# Patient Record
Sex: MALE | Race: WHITE | NOT HISPANIC OR LATINO | Employment: FULL TIME | URBAN - METROPOLITAN AREA
[De-identification: names, ages, dates, MRNs, and addresses within clinical notes are randomized per-mention and may not be internally consistent; named-entity substitution may affect disease eponyms.]

---

## 2017-01-01 ENCOUNTER — LAB VISIT (OUTPATIENT)
Dept: LAB | Facility: HOSPITAL | Age: 57
End: 2017-01-01
Attending: ALLERGY & IMMUNOLOGY
Payer: COMMERCIAL

## 2017-01-01 ENCOUNTER — OFFICE VISIT (OUTPATIENT)
Dept: ALLERGY | Facility: CLINIC | Age: 57
End: 2017-01-01
Payer: COMMERCIAL

## 2017-01-01 ENCOUNTER — OFFICE VISIT (OUTPATIENT)
Dept: DERMATOLOGY | Facility: CLINIC | Age: 57
End: 2017-01-01
Payer: COMMERCIAL

## 2017-01-01 ENCOUNTER — TELEPHONE (OUTPATIENT)
Dept: PULMONOLOGY | Facility: CLINIC | Age: 57
End: 2017-01-01

## 2017-01-01 ENCOUNTER — TELEPHONE (OUTPATIENT)
Dept: ALLERGY | Facility: CLINIC | Age: 57
End: 2017-01-01

## 2017-01-01 VITALS
HEART RATE: 99 BPM | WEIGHT: 311 LBS | BODY MASS INDEX: 44.62 KG/M2 | HEIGHT: 70 IN | OXYGEN SATURATION: 94 % | BODY MASS INDEX: 43.33 KG/M2 | DIASTOLIC BLOOD PRESSURE: 84 MMHG | SYSTOLIC BLOOD PRESSURE: 136 MMHG | WEIGHT: 302.69 LBS

## 2017-01-01 DIAGNOSIS — R06.09 EXERTIONAL DYSPNEA: ICD-10-CM

## 2017-01-01 DIAGNOSIS — R76.0 ABNORMAL ANTIBODY TITER: Primary | ICD-10-CM

## 2017-01-01 DIAGNOSIS — R76.0 ABNORMAL ANTIBODY TITER: ICD-10-CM

## 2017-01-01 DIAGNOSIS — J31.0 CHRONIC RHINITIS, UNSPECIFIED TYPE: ICD-10-CM

## 2017-01-01 DIAGNOSIS — J84.9 ILD (INTERSTITIAL LUNG DISEASE): ICD-10-CM

## 2017-01-01 DIAGNOSIS — L71.9 ROSACEA: Primary | ICD-10-CM

## 2017-01-01 LAB
DEPRECATED S PNEUM 1 IGG SER-MCNC: 1.1 MCG/ML
DEPRECATED S PNEUM12 IGG SER-MCNC: <0.3 MCG/ML
DEPRECATED S PNEUM14 IGG SER-MCNC: 64.8 MCG/ML
DEPRECATED S PNEUM19 IGG SER-MCNC: 2.7 MCG/ML
DEPRECATED S PNEUM23 IGG SER-MCNC: 0.3 MCG/ML
DEPRECATED S PNEUM3 IGG SER-MCNC: 1.2 MCG/ML
DEPRECATED S PNEUM4 IGG SER-MCNC: <0.3 MCG/ML
DEPRECATED S PNEUM5 IGG SER-MCNC: 0.7 MCG/ML
DEPRECATED S PNEUM8 IGG SER-MCNC: 2.3 MCG/ML
DEPRECATED S PNEUM9 IGG SER-MCNC: 0.6 MCG/ML
S PNEUM DA 18C IGG SER-MCNC: 38.5 MCG/ML
S PNEUM DA 6B IGG SER-MCNC: 0.6 MCG/ML
S PNEUM DA 7F IGG SER-MCNC: 0.9 MCG/ML
S PNEUM DA 9V IGG SER-MCNC: 5.5 MCG/ML

## 2017-01-01 PROCEDURE — 36415 COLL VENOUS BLD VENIPUNCTURE: CPT

## 2017-01-01 PROCEDURE — 90471 IMMUNIZATION ADMIN: CPT | Mod: S$GLB,,, | Performed by: ALLERGY & IMMUNOLOGY

## 2017-01-01 PROCEDURE — 90670 PCV13 VACCINE IM: CPT | Mod: S$GLB,,, | Performed by: ALLERGY & IMMUNOLOGY

## 2017-01-01 PROCEDURE — 86317 IMMUNOASSAY INFECTIOUS AGENT: CPT | Mod: 59

## 2017-01-01 PROCEDURE — 99212 OFFICE O/P EST SF 10 MIN: CPT | Mod: S$GLB,,, | Performed by: DERMATOLOGY

## 2017-01-01 PROCEDURE — 99999 PR PBB SHADOW E&M-EST. PATIENT-LVL III: CPT | Mod: PBBFAC,,, | Performed by: ALLERGY & IMMUNOLOGY

## 2017-01-01 PROCEDURE — 99214 OFFICE O/P EST MOD 30 MIN: CPT | Mod: 25,S$GLB,, | Performed by: ALLERGY & IMMUNOLOGY

## 2017-01-01 PROCEDURE — 99999 PR PBB SHADOW E&M-EST. PATIENT-LVL III: CPT | Mod: PBBFAC,,, | Performed by: DERMATOLOGY

## 2017-07-10 ENCOUNTER — HOSPITAL ENCOUNTER (OUTPATIENT)
Dept: RADIOLOGY | Facility: HOSPITAL | Age: 57
Discharge: HOME | End: 2017-07-10
Attending: INTERNAL MEDICINE
Payer: COMMERCIAL

## 2017-07-10 ENCOUNTER — HOSPITAL ENCOUNTER (OUTPATIENT)
Dept: PULMONOLOGY | Facility: CLINIC | Age: 57
Discharge: HOME | End: 2017-07-10
Payer: COMMERCIAL

## 2017-07-10 ENCOUNTER — OFFICE VISIT (OUTPATIENT)
Dept: PULMONOLOGY | Facility: CLINIC | Age: 57
End: 2017-07-10
Payer: COMMERCIAL

## 2017-07-10 VITALS
SYSTOLIC BLOOD PRESSURE: 138 MMHG | HEIGHT: 70 IN | WEIGHT: 315 LBS | RESPIRATION RATE: 16 BRPM | HEART RATE: 103 BPM | DIASTOLIC BLOOD PRESSURE: 84 MMHG | OXYGEN SATURATION: 93 % | BODY MASS INDEX: 45.1 KG/M2

## 2017-07-10 DIAGNOSIS — J30.0 CHRONIC VASOMOTOR RHINITIS: ICD-10-CM

## 2017-07-10 DIAGNOSIS — J84.9 ILD (INTERSTITIAL LUNG DISEASE): ICD-10-CM

## 2017-07-10 DIAGNOSIS — E66.01 MORBID OBESITY DUE TO EXCESS CALORIES: ICD-10-CM

## 2017-07-10 PROBLEM — J31.0 CHRONIC RHINITIS: Status: ACTIVE | Noted: 2017-07-10

## 2017-07-10 LAB
POST FEV1 FVC: 0.87
POST FEV1: 1.4
POST FVC: 1.61
PRE FEV1 FVC: 87
PRE FEV1: 1.35
PRE FVC: 1.56
PREDICTED FEV1 FVC: 81
PREDICTED FEV1: 3.52
PREDICTED FVC: 4.33

## 2017-07-10 PROCEDURE — 99205 OFFICE O/P NEW HI 60 MIN: CPT | Mod: 25,S$GLB,, | Performed by: INTERNAL MEDICINE

## 2017-07-10 PROCEDURE — 94729 DIFFUSING CAPACITY: CPT | Mod: S$GLB,,, | Performed by: INTERNAL MEDICINE

## 2017-07-10 PROCEDURE — 94060 EVALUATION OF WHEEZING: CPT | Mod: S$GLB,,, | Performed by: INTERNAL MEDICINE

## 2017-07-10 PROCEDURE — 71250 CT THORAX DX C-: CPT | Mod: 26,,, | Performed by: RADIOLOGY

## 2017-07-10 PROCEDURE — 99999 PR PBB SHADOW E&M-NEW PATIENT-LVL IV: CPT | Mod: PBBFAC,,, | Performed by: INTERNAL MEDICINE

## 2017-07-10 PROCEDURE — 71250 CT THORAX DX C-: CPT | Mod: TC

## 2017-07-10 RX ORDER — PREDNISONE 5 MG/1
TABLET ORAL
Qty: 70 TABLET | Refills: 2 | Status: SHIPPED | OUTPATIENT
Start: 2017-07-10 | End: 2017-07-28

## 2017-07-10 RX ORDER — PREDNISONE 20 MG/1
20 TABLET ORAL DAILY
COMMUNITY
Start: 2017-06-26 | End: 2017-07-10

## 2017-07-10 NOTE — PROGRESS NOTES
"Subjective:       Patient ID: Patrick Dennis is a 57 y.o. male.    Chief Complaint: Interstitial Lung Disease    HPI HISTORY OF PRESENT ILLNESS:  Mr. Dennis is a 57-year-old nonsmoker who comes   for assessment of interstitial lung disease.  He explains that he developed the   symptom of cough near the end of last year.  He saw his physician at home and   was treated initially for "bronchitis."  His workup at that time eventually   included a CT scan, which revealed evidence for interstitial lung abnormalities.    Around the same time, he also began having exertional dyspnea.  He continues   to have a cough each morning and some associated sinus drainage.    Laboratory studies done during his workup at home revealed positive markers for   antibodies for hypersensitivity pneumonitis.  At the time of that finding, he   was begun on treatment with prednisone.  He has apparently been on daily   prednisone for the past several months, although he is not sure of the exact   date when he began this therapy.  He does state that his cough seems to be some   improved.  Unfortunately, his weight has increased to approximately 40 pounds   and he has developed significant facial swelling.    Mr. Dennis does not know of any proven past lung diseases.  He has never been   diagnosed with significant cardiac disease.  He does not have any significant   arthritis.    Mr. Dennis is a nonsmoker.  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    He has an exposure to sawdust and other dust at least intermittently at his   job.  He believes his family history is negative for lung diseases.    DATA:  I have reviewed images from the CT scans of the chest, which were done   between November 2016 and June of this year.  These outside studies show that   the cardiac silhouette is not enlarged.  There are no definite pleural   abnormalities.  There are scattered reticular abnormalities, mostly in a   subpleural distribution, " present within all lung zones.  There also are   scattered areas of ground-glass opacity.  Overall, these CT scans do not show   any marked interval changes during the interval encompassed by these studies.    Finalizing this dictation was delayed by the recent disruption in transcription services.    For expediency, it is being signed without review.      CB/HN  dd: 07/17/2017 09:49:19 (CDT)  td: 07/17/2017 23:17:04 (CDT)  Doc ID   #[id number]  Job ID #[id number]    CC:       Review of Systems   Constitutional: Negative for fever and fatigue.   HENT: Positive for postnasal drip and congestion. Negative for sinus pressure and voice change.    Respiratory: Positive for cough and dyspnea on extertion. Negative for sputum production, shortness of breath and wheezing.    Cardiovascular: Negative for chest pain and leg swelling.   Genitourinary: Negative for difficulty urinating.   Musculoskeletal: Negative for arthralgias and back pain.   Skin: Negative for rash.   Gastrointestinal: Negative for abdominal pain and acid reflux.   Neurological: Negative for dizziness and weakness.   Hematological: Negative for adenopathy.       Objective:      Physical Exam   Constitutional: He is oriented to person, place, and time. He appears well-developed and well-nourished. He is obese.   HENT:   Head: Normocephalic.   Mouth/Throat: Oropharynx is clear and moist. No oropharyngeal exudate.   Neck: Normal range of motion. Neck supple. No JVD present. No tracheal deviation present. No thyromegaly present.   Cardiovascular: Normal rate, regular rhythm and normal heart sounds.    No murmur heard.  Pulmonary/Chest: Symmetric chest wall expansion. No stridor. He has decreased breath sounds. He has no wheezes. He has no rhonchi. He has no rales. He exhibits no tenderness.   Abdominal: Soft.   Musculoskeletal: He exhibits edema (mild LE edema).   Lymphadenopathy:     He has no cervical adenopathy.   Neurological: He is alert and oriented to person,  place, and time.   Skin: Skin is warm and dry. No rash noted. No erythema. Nails show no clubbing.   Psychiatric: He has a normal mood and affect.   Vitals reviewed.    Personal Diagnostic Review    No flowsheet data found.      Assessment:       1. ILD (interstitial lung disease)    2. Morbid obesity due to excess calories    3. Chronic vasomotor rhinitis        Outpatient Encounter Prescriptions as of 7/10/2017   Medication Sig Dispense Refill    predniSONE (DELTASONE) 5 MG tablet Take 3 tabs each AM x 1 week, then 2 tabs each AM afterward.  Take with food. 70 tablet 2     No facility-administered encounter medications on file as of 7/10/2017.      Orders Placed This Encounter   Procedures    CT Chest Without Contrast     Standing Status:   Future     Number of Occurrences:   1     Standing Expiration Date:   7/10/2018    CBC auto differential     Standing Status:   Future     Number of Occurrences:   1     Standing Expiration Date:   7/10/2018    Comprehensive metabolic panel     Standing Status:   Future     Number of Occurrences:   1     Standing Expiration Date:   7/10/2018    IgE     Standing Status:   Future     Number of Occurrences:   1     Standing Expiration Date:   7/10/2018    Fungal Precipitins (Hypersensitivity PneumonitisI)     Standing Status:   Future     Number of Occurrences:   1     Standing Expiration Date:   7/10/2018    C-reactive protein     Standing Status:   Future     Number of Occurrences:   1     Standing Expiration Date:   7/10/2018    Sedimentation rate, manual     Standing Status:   Future     Number of Occurrences:   1     Standing Expiration Date:   7/10/2018    Spirometry with/without bronchodilator     Standing Status:   Future     Number of Occurrences:   1     Standing Expiration Date:   7/10/2018    DLCO-Carbon Monoxide Diffusing Capacity     Standing Status:   Future     Number of Occurrences:   1     Standing Expiration Date:   7/10/2018     Plan:     CBC, CMP,  ESR, CRP, and HP panel.  Pre/Post Spirometry and DLCO.  CT Chest with HR images to compare with outside studies.  Decrease Prednisone dose 15 mg QAM x 1 week, then 10 mg QAM afterward.  Phone review.    NOTE:  72 min visit with >50% time reviewing outside records and images;   and counseling regarding management of ILD.

## 2017-07-10 NOTE — LETTER
July 10, 2017      Mayra Burgos MD  [address] [place] [address]           [facility] - Pulmonary Services  [facility]  [place] [address]  Phone: [phone]          Patient: Patrick Dennis   MR Number: [id number]   YOB: 1960   Date of Visit: 7/10/2017       Dear Dr. Mayra Burgos:    Thank you for referring Patrick Dennis to me for evaluation. Attached you will find relevant portions of my assessment and plan of care.    If you have questions, please do not hesitate to call me. I look forward to following Patrick Dennis along with you.    Sincerely,    BAILEY Solorio MD    Enclosure  CC:  No Recipients    If you would like to receive this communication electronically, please contact [email or web address] or [phone] to request more information on [employer] Link access.    For providers and/or their staff who would like to refer a patient to [facility], please contact us through our one-stop-shop provider referral line, [facility], at [phone].    If you feel you have received this communication in error or would no longer like to receive these types of communications, please e-mail [email or web address]

## 2017-07-11 NOTE — PATIENT INSTRUCTIONS
CBC, CMP, ESR, CRP, and HP panel.  Pre/Post Spirometry and DLCO.  CT Chest with HR images to compare with outside studies.  Decrease Prednisone dose 15 mg QAM x 1 week, then 10 mg QAM afterward.  Phone review.

## 2017-07-25 ENCOUNTER — TELEPHONE (OUTPATIENT)
Dept: PULMONOLOGY | Facility: CLINIC | Age: 57
End: 2017-07-25

## 2017-07-27 DIAGNOSIS — R06.09 EXERTIONAL DYSPNEA: ICD-10-CM

## 2017-07-28 ENCOUNTER — HOSPITAL ENCOUNTER (OUTPATIENT)
Dept: CARDIOLOGY | Facility: CLINIC | Age: 57
Discharge: HOME | End: 2017-07-28
Payer: COMMERCIAL

## 2017-07-28 ENCOUNTER — OFFICE VISIT (OUTPATIENT)
Dept: PULMONOLOGY | Facility: CLINIC | Age: 57
End: 2017-07-28
Payer: COMMERCIAL

## 2017-07-28 ENCOUNTER — HOSPITAL ENCOUNTER (OUTPATIENT)
Dept: PULMONOLOGY | Facility: CLINIC | Age: 57
Discharge: HOME | End: 2017-07-28
Payer: COMMERCIAL

## 2017-07-28 VITALS
HEIGHT: 70 IN | WEIGHT: 315 LBS | OXYGEN SATURATION: 88 % | BODY MASS INDEX: 45.1 KG/M2 | SYSTOLIC BLOOD PRESSURE: 124 MMHG | RESPIRATION RATE: 16 BRPM | HEART RATE: 109 BPM | DIASTOLIC BLOOD PRESSURE: 80 MMHG

## 2017-07-28 VITALS — HEIGHT: 68 IN | WEIGHT: 315 LBS | BODY MASS INDEX: 47.74 KG/M2

## 2017-07-28 DIAGNOSIS — J20.9 ACUTE BRONCHITIS, UNSPECIFIED ORGANISM: ICD-10-CM

## 2017-07-28 DIAGNOSIS — E66.01 MORBID OBESITY DUE TO EXCESS CALORIES: ICD-10-CM

## 2017-07-28 DIAGNOSIS — R06.09 EXERTIONAL DYSPNEA: ICD-10-CM

## 2017-07-28 DIAGNOSIS — J84.9 ILD (INTERSTITIAL LUNG DISEASE): ICD-10-CM

## 2017-07-28 DIAGNOSIS — J84.9 ILD (INTERSTITIAL LUNG DISEASE): Primary | ICD-10-CM

## 2017-07-28 LAB
DIASTOLIC DYSFUNCTION: YES
ESTIMATED PA SYSTOLIC PRESSURE: 36
RETIRED EF AND QEF - SEE NOTES: 45 (ref 55–65)
TRICUSPID VALVE REGURGITATION: ABNORMAL

## 2017-07-28 PROCEDURE — 96374 THER/PROPH/DIAG INJ IV PUSH: CPT | Mod: 59,S$GLB,, | Performed by: INTERNAL MEDICINE

## 2017-07-28 PROCEDURE — 99214 OFFICE O/P EST MOD 30 MIN: CPT | Mod: 25,S$GLB,, | Performed by: INTERNAL MEDICINE

## 2017-07-28 PROCEDURE — 94620 PR PULMONARY STRESS TESTING,SIMPLE: CPT | Mod: S$GLB,,, | Performed by: INTERNAL MEDICINE

## 2017-07-28 PROCEDURE — 99999 PR PBB SHADOW E&M-EST. PATIENT-LVL IV: CPT | Mod: PBBFAC,,, | Performed by: INTERNAL MEDICINE

## 2017-07-28 PROCEDURE — 93306 TTE W/DOPPLER COMPLETE: CPT | Mod: S$GLB,,, | Performed by: INTERNAL MEDICINE

## 2017-07-28 RX ORDER — PREDNISONE 2.5 MG/1
7.5 TABLET ORAL DAILY
Qty: 90 TABLET | Refills: 2 | Status: SHIPPED | OUTPATIENT
Start: 2017-07-28 | End: 2017-07-28 | Stop reason: SDUPTHER

## 2017-07-28 RX ORDER — PREDNISONE 2.5 MG/1
7.5 TABLET ORAL DAILY
Qty: 90 TABLET | Refills: 2 | Status: SHIPPED | OUTPATIENT
Start: 2017-07-28 | End: 2017-08-07

## 2017-07-28 RX ORDER — DOXYCYCLINE HYCLATE 100 MG
100 TABLET ORAL EVERY 12 HOURS
Qty: 20 TABLET | Refills: 1 | Status: SHIPPED | OUTPATIENT
Start: 2017-07-28 | End: 2017-10-02

## 2017-07-28 NOTE — PROGRESS NOTES
Patient identified via spelling of name and date of birth. Patient denies blood transfusion reaction. Consent obtained for use of contrast. Definity 1.5ml IVP vorb Dr. Roe. 22 gauge saline lock started in right forearm under aseptic technique. Definity 1.5ml IVP used as contrast for 2 d imaging. Saline lock d/reymundo and pressure dressing applied. Tolerated procedure well.

## 2017-07-29 NOTE — PATIENT INSTRUCTIONS
ACE level, 2 D Echo with color, 6 MWT (O2 Qual protocol).  Begin Doxycycline 100 mg twice daily x 10 days.  Decrease Prednisone dose to 7.5 mg daily.  Begin O2 therapy with activities and during sleep if above study qualifies him.  Phone review of above, and consider CTS consult to consider   for VATS/lung biopsy vs empiric trial with Cellcept or azathioprine.

## 2017-07-29 NOTE — PROGRESS NOTES
Subjective:       Patient ID: Patrick Dennis is a 57 y.o. male.    Chief Complaint: Results    HPI REVIEW VISIT    Mr. Dennis returns to follow up recent studies done to investigate chronic   interstitial lung disease.  Today, he reports continued shortness of breath with   modest daily activities.  He also is having an ongoing cough with sputum which   has become discolored.  He is not having any fever or hemoptysis.  Following    the visit here earlier this month, he reduced his dose of prednisone to 10 mg   daily.    Laboratory studies done at the time of his previous visit show normal (or no   significant abnormalities) values for the CBC, sedimentation rate, C-reactive protein,   IgE, and the hypersensitivity pneumonitis panel.  His comprehensive metabolic   profile does show an elevation in the bicarbonate at 32, and the glucose was 159   in this nonfasting sample.    I have reviewed the images from the CT scan of the chest done earlier this month,   and compared these with the outside scans which he has provided.  The outside   scans were obtained between November of last year and June of this year.  All   the scans show prominent central pulmonary vasculature without any acute   cardiovascular abnormalities.  There are bilateral reticular and ground-glass   abnormalities present throughout essentially all lung zones in a somewhat   uneven distribution.  There is associated bronchiectasis.  There do not appear   to be any areas of honeycomb abnormality.  Overall, radiographic findings are   similar throughout the period of time encompassed by these studies.    Pulmonary function studies done at the time of his previous visit show the   forced vital capacity is 1.56 L, which is 36% of predicted.  The FEV1 is 1.35 L,   or 38% of predicted.  The ratio of these values is 87%.  The diffusion capacity   is 15.8, which is 58% of predicted.  Following an aerosol bronchodilator   administration, there is no  significant change in the spirometry parameters.    These pulmonary function study results are consistent with a moderate to severe   restrictive ventilatory impairment.  There is no evidence of obstruction or a   bronchodilator response.      CB/HN  dd: 07/28/2017 21:41:39 (CDT)  td: 07/29/2017 00:21:14 (CDT)  Doc ID   #[id number]  Job ID #[id number]    CC:       Review of Systems    Objective:      Physical Exam  Personal Diagnostic Review    Pulmonary Function Tests 7/28/2017   Ordering Provider MD Osmin.   Performing nurse/tech/RT MIRIAN Rivera   Diagnosis Qualify for Oxygen   Height 68   Weight 5192.27   BMI (Calculated) 49.4   Patient Race    6MWT Status completed without stopping   Patient Reported Dyspnea   Was O2 used? No   6MW Distance walked (feet) 1400   Distance walked (meters) 426.72   Did patient stop? No   Type of assistive device(s) used? no assistive devices   Oxygen Saturation 92   Supplemental Oxygen Room Air   Heart Rate 98   Blood Pressure 144/87   Adina Dyspnea Rating  very, very light (just noticeable)   Oxygen Saturation 68   Supplemental Oxygen Room Air   Heart Rate 142   Blood Pressure 222/110   Adina Dyspnea Rating  very heavy   Recovery Time (seconds) 228   Oxygen Saturation 94   Supplemental Oxygen Room Air   Heart Rate 110   Blood Pressure 175/89   Is procedure ready for interpretation? Yes   Oxygen Qualification? Yes   Oxygen Saturation 98   Supplemental Oxygen 2 L/M   Heart Rate 106   Blood Pressure 160/96   Adina Dyspnea Rating  very, very light (just noticeable)   Oxygen Saturation 95   Supplemental Oxygen 2 L/M   Heart Rate 119   Blood Pressure 168/101   Adina Dyspnea Rating  very, very light (just noticeable)   Recovery Time (seconds) 107   Oxygen Saturation 98   Supplemental Oxygen 2 L/M   Heart Rate 107   Blood Pressure 163/97   Some recent data might be hidden         Assessment:       1. ILD (interstitial lung disease)    2. Acute bronchitis, unspecified organism    3. Morbid  obesity due to excess calories    4. Exertional dyspnea        Outpatient Encounter Prescriptions as of 7/28/2017   Medication Sig Dispense Refill    CHERATUSSIN AC  mg/5 mL syrup       doxycycline (VIBRA-TABS) 100 MG tablet Take 1 tablet (100 mg total) by mouth every 12 (twelve) hours. 20 tablet 1    predniSONE (DELTASONE) 2.5 MG tablet Take 3 tablets (7.5 mg total) by mouth once daily. 90 tablet 2    [DISCONTINUED] predniSONE (DELTASONE) 2.5 MG tablet Take 3 tablets (7.5 mg total) by mouth once daily. 90 tablet 2    [DISCONTINUED] predniSONE (DELTASONE) 5 MG tablet Take 3 tabs each AM x 1 week, then 2 tabs each AM afterward.  Take with food. 70 tablet 2     No facility-administered encounter medications on file as of 7/28/2017.      Orders Placed This Encounter   Procedures    Angiotensin converting enzyme     Standing Status:   Future     Number of Occurrences:   1     Standing Expiration Date:   7/28/2018    Six Minute Walk Test to qualify for Home Oxygen     Standing Status:   Future     Number of Occurrences:   1     Standing Expiration Date:   7/29/2018     Plan:     ACE level, 2 D Echo with color, 6 MWT (O2 Qual protocol).  Begin Doxycycline 100 mg twice daily x 10 days.  Decrease Prednisone dose to 7.5 mg daily.  Begin O2 therapy with activities and during sleep if above study qualifies him.  Phone review of above, and consider CTS consult to consider   for VATS/lung biopsy vs empiric trial with Cellcept or azathioprine.      NOTE:  36 min visit with all time counseling patient and family regarding management of ILD

## 2017-07-29 NOTE — PROCEDURES
Patrick Dennis is a 57 y.o.  male patient, who presents for a 6 minute walk test ordered by Parag Solorio MD.  The diagnosis is Qualify for Oxygen; Interstitial Lung Disease.  The patient's BMI is 49.4 kg/m2. Predicted distance (lower limit of normal) is 321.23 meters.    Test Results:    The test was completed without stopping.  The total time walked was 360 seconds.  During walking, the patient reported:  Dyspnea. The patient used supplemental oxygen during repeat testing.     07/28/2017---------Distance: 426.72 meters (1400 feet)     O2 Sat % Supplemental Oxygen Heart Rate Blood Pressure Adina Scale   Pre-exercise  (Resting) 92 % Room Air 98 bpm 144/87 mmHg 0.5   During Exercise 68 % Room Air 142 bpm 222/100 mmHg 7-8   Post-exercise   94 % Room Air  110 bpm 175/89 mmHg       Recovery Time: 228 seconds    Oxygen Qualification:     O2 Sat % Supplemental Oxygen Heart Rate Blood Pressure Adina Scale   Pre-exercise  (Resting) 98 % 2 L/M  106 bpm  160/96 mmHg  0.5    During Exercise 95 %  2 L/M  119 bpm  168/101 mmHg  0.5    Post-exercise   98 %  2 L/M  107 bpm  163/97 mmHg         Performing nurse/tech: MIRIAN Rivera    PREVIOUS STUDY:   The patient has not had a previous study.      CLINICAL INTERPRETATION:  Six minute walk distance is 426.72 meters (1400 feet) with very heavy dyspnea.  During exercise, there was significant desaturation while breathing room air.  Both blood pressure and heart rate increased significantly with walking.  This may represent a hypertensive and a tachycardic response to exercise.  The patient did not report non-pulmonary symptoms during exercise.  The patient may benefit from using supplemental oxygen during exertion.  No previous study performed.  Based upon age and body mass index, exercise capacity is normal.   Oxygen saturation did improve while breathing supplemental oxygen.

## 2017-08-02 ENCOUNTER — TELEPHONE (OUTPATIENT)
Dept: PULMONOLOGY | Facility: CLINIC | Age: 57
End: 2017-08-02

## 2017-08-02 DIAGNOSIS — R06.09 EXERTIONAL DYSPNEA: ICD-10-CM

## 2017-08-02 DIAGNOSIS — J84.9 ILD (INTERSTITIAL LUNG DISEASE): Primary | ICD-10-CM

## 2017-08-02 RX ORDER — MYCOPHENOLATE MOFETIL 500 MG/1
TABLET ORAL
Qty: 120 TABLET | Refills: 6 | Status: SHIPPED | OUTPATIENT
Start: 2017-08-02 | End: 2018-01-01 | Stop reason: SDUPTHER

## 2017-08-02 NOTE — TELEPHONE ENCOUNTER
----- Message from Alie Ford sent at 8/2/2017 12:36 PM CDT -----  Contact: pt [phone]  Pt requests the nurse to call him regarding his results. Pt can be reached at [phone].

## 2017-08-02 NOTE — TELEPHONE ENCOUNTER
Pt informed that recent tests show marked decline in oximetry during activities despite adequate baseline value.  Echo shows evidence for mild systolic and diastolic LV dysfunction, but PA pressure not significantly elevated.  ACE level nl.    We discussed again the option of surgery consult to consider for lung biopsy.  He is reluctant to pursue this; and I believe his weight would make this a more difficult undertaking.  As an alternative, he will begin trial with Cellcept.  Arrange f/u here in 6 weeks with check of CBC and repeat PFTs.

## 2017-09-18 ENCOUNTER — OFFICE VISIT (OUTPATIENT)
Dept: ALLERGY | Facility: CLINIC | Age: 57
End: 2017-09-18
Payer: COMMERCIAL

## 2017-09-18 VITALS
WEIGHT: 315 LBS | BODY MASS INDEX: 45.1 KG/M2 | HEART RATE: 84 BPM | HEIGHT: 70 IN | SYSTOLIC BLOOD PRESSURE: 128 MMHG | OXYGEN SATURATION: 95 % | DIASTOLIC BLOOD PRESSURE: 74 MMHG

## 2017-09-18 DIAGNOSIS — L30.9 DERMATITIS DUE TO UNKNOWN CAUSE: ICD-10-CM

## 2017-09-18 DIAGNOSIS — J84.9 INTERSTITIAL LUNG DISEASE: ICD-10-CM

## 2017-09-18 DIAGNOSIS — R05.9 COUGH: Primary | ICD-10-CM

## 2017-09-18 DIAGNOSIS — J47.9 BRONCHIECTASIS WITHOUT COMPLICATION: ICD-10-CM

## 2017-09-18 DIAGNOSIS — J31.0 CHRONIC RHINITIS, UNSPECIFIED TYPE: ICD-10-CM

## 2017-09-18 PROCEDURE — 99204 OFFICE O/P NEW MOD 45 MIN: CPT | Mod: S$GLB,,, | Performed by: ALLERGY & IMMUNOLOGY

## 2017-09-18 PROCEDURE — 99999 PR PBB SHADOW E&M-EST. PATIENT-LVL III: CPT | Mod: PBBFAC,,, | Performed by: ALLERGY & IMMUNOLOGY

## 2017-09-18 PROCEDURE — 3008F BODY MASS INDEX DOCD: CPT | Mod: S$GLB,,, | Performed by: ALLERGY & IMMUNOLOGY

## 2017-09-18 NOTE — PROGRESS NOTES
Subjective:       Patient ID: Patrick Dennis is a 57 y.o. male.    Chief Complaint:  Shortness of Breath and Sinus Problem (runny nose, cough, sneezing)  and chronic, recurrent facial rash    HPI    Pt presents w mother.   Has hx interstitial lung disease, followed by pulmonary, with negative fungal precipitins panel and IgE < 35 IU/ml.  Chest CTs have shown bilateral reticular ground-glass opacities and associated bronchiectasis.    Frequent exacerbations of cough, and shortness of breath have been frequent since about Nov 2016. Has been on abx for suspected lower resp infections about 4 times over last year. Prior to the last year, denies freq abx or freq cough or shortness of breath.    Does report occ sneezing, watery eyes worse in spring. Denies nasal congestion. Has had inreased pnd, throat clearing over last year  Denies overt GERD sx's.    He is also concerned about a chronic facial rash that he had been dealing with since his teenage years. It seems to be aggravated by every facial product and/or soap that he has tried, including Vanicream. Even when he doesn't use soap/cleaning products on his face though, there is some degree of redness, irritation.  He doesn't appreciate any other aggravating factors. Hasn't had prev dermatology eval.          Family History   Problem Relation Age of Onset    No Known Problems Mother     No Known Problems Father     Asthma Neg Hx     Emphysema Neg Hx          Environmental History: Pets in the home: dogs (2).  [place]: tile or linoleum floors and [id number]-de-[id number] carpeting  Tobacco Smoke in Home: no   Non-smoker    History reviewed. No pertinent past medical history.    Family History   Problem Relation Age of Onset    No Known Problems Mother     No Known Problems Father     Asthma Neg Hx     Emphysema Neg Hx    no parental atopy or asthma      Review of Systems   Constitutional: Negative for activity change, fatigue and fever.   HENT: Positive for  postnasal drip and rhinorrhea. Negative for congestion, sinus pressure and sneezing.    Eyes: Negative for discharge, redness and itching.   Respiratory: Positive for cough and shortness of breath. Negative for wheezing.    Cardiovascular: Negative for chest pain.   Gastrointestinal: Negative for constipation, diarrhea, nausea and vomiting.   Genitourinary: Negative for difficulty urinating.   Musculoskeletal: Negative for joint swelling and myalgias.   Skin: Positive for rash (on face).   Neurological: Negative for headaches.   Hematological: Does not bruise/bleed easily.   Psychiatric/Behavioral: Negative for behavioral problems and sleep disturbance.        Objective:    Physical Exam   Constitutional: He is oriented to person, place, and time. He appears well-developed and well-nourished. No distress.   HENT:   Head: Normocephalic and atraumatic.   Right Ear: Tympanic membrane and external ear normal.   Left Ear: Tympanic membrane and external ear normal.   Nose: Nose normal.   Mouth/Throat: Oropharynx is clear and moist. No oropharyngeal exudate.   Eyes: Conjunctivae are normal. Right eye exhibits no discharge. Left eye exhibits no discharge.   Neck: Normal range of motion. Neck supple. No thyromegaly present.   Cardiovascular: Normal rate and regular rhythm.    Pulmonary/Chest: Effort normal and breath sounds normal. No respiratory distress. He has no wheezes.   Abdominal: Soft. Bowel sounds are normal. He exhibits no distension. There is no tenderness.   Musculoskeletal: Normal range of motion. He exhibits no edema.   Lymphadenopathy:     He has no cervical adenopathy.   Neurological: He is alert and oriented to person, place, and time. He exhibits normal muscle tone.   Skin: Skin is warm and dry. He is not diaphoretic. No erythema.   Erythematous cheeks, forehead w few papular lesions   Psychiatric: He has a normal mood and affect. His behavior is normal. Judgment and thought content normal.          Pulmonary notes reviewed      Assessment:       1. Cough    2. Chronic rhinitis, unspecified type    3. Interstitial lung disease    4. Dermatitis due to unknown cause    5. Bronchiectasis without complication         Plan:       Patrick was seen today for shortness of breath and sinus problem.    Diagnoses and all orders for this visit:    Cough  -     Immunoglobulins (IgG, IgA, IgM) Quantitative; Future  -     S.pneumoniae IgG Serotypes; Future  -     Cat epithelium IgE; Future  -     Dog dander IgE; Future  -     D. farinae IgE; Future  -     D. pteronyssinus IgE; Future  -     Aspergillus fumagatus IgE; Future  -     Allergen-Alternaria Alternata; Future  -     Cockroach, American IgE; Future  -     Bahia grass IgE; Future  -     Steven IgE; Future  -     Oak, white IgE; Future  -     Allergen-Cedar; Future  -     Allergen, Pecan [place] IgE; Future  -     Ragweed, short, common IgE; Future  -     Marsh elder, rough IgE; Future  -     Plantain, English IgE; Future    Chronic rhinitis, unspecified type    Interstitial lung disease   Treatment, plan, as per pulmonary    Dermatitis due to unknown cause   Hx and duration less suspicious for allergic contact dermatitis. Dermatology referral

## 2017-09-26 ENCOUNTER — TELEPHONE (OUTPATIENT)
Dept: ALLERGY | Facility: CLINIC | Age: 57
End: 2017-09-26

## 2017-09-26 NOTE — TELEPHONE ENCOUNTER
Relayed results to patient. Patient stated he was busy and will call me back later to schedule a follow up appointment.

## 2017-09-26 NOTE — TELEPHONE ENCOUNTER
----- Message from Waqar Yan MD sent at 9/25/2017  1:15 PM CDT -----  Please call let them know that evaluation of pt's immune system showed that pt may benefit from an extra vaccine, Pneumovax, to decrease frequency of infections, cough. Please schedule follow up appointment to review labs and discuss Pneumovax vaccine. Allergy tests are positive to dust mites

## 2017-10-02 ENCOUNTER — OFFICE VISIT (OUTPATIENT)
Dept: ALLERGY | Facility: CLINIC | Age: 57
End: 2017-10-02
Payer: COMMERCIAL

## 2017-10-02 VITALS
WEIGHT: 311.5 LBS | DIASTOLIC BLOOD PRESSURE: 82 MMHG | HEART RATE: 78 BPM | HEIGHT: 70 IN | OXYGEN SATURATION: 97 % | SYSTOLIC BLOOD PRESSURE: 124 MMHG | BODY MASS INDEX: 44.59 KG/M2

## 2017-10-02 DIAGNOSIS — R76.0 ABNORMAL ANTIBODY TITER: Primary | ICD-10-CM

## 2017-10-02 DIAGNOSIS — J30.89 ALLERGIC RHINITIS DUE TO DUST MITE: ICD-10-CM

## 2017-10-02 DIAGNOSIS — R05.9 COUGH: ICD-10-CM

## 2017-10-02 DIAGNOSIS — J84.9 ILD (INTERSTITIAL LUNG DISEASE): ICD-10-CM

## 2017-10-02 PROCEDURE — 99999 PR PBB SHADOW E&M-EST. PATIENT-LVL III: CPT | Mod: PBBFAC,,, | Performed by: ALLERGY & IMMUNOLOGY

## 2017-10-02 PROCEDURE — 90732 PPSV23 VACC 2 YRS+ SUBQ/IM: CPT | Mod: S$GLB,,, | Performed by: ALLERGY & IMMUNOLOGY

## 2017-10-02 PROCEDURE — 90471 IMMUNIZATION ADMIN: CPT | Mod: S$GLB,,, | Performed by: ALLERGY & IMMUNOLOGY

## 2017-10-02 PROCEDURE — 99214 OFFICE O/P EST MOD 30 MIN: CPT | Mod: S$GLB,,, | Performed by: ALLERGY & IMMUNOLOGY

## 2017-10-02 NOTE — PROGRESS NOTES
Subjective:       Patient ID: Patrick Dennis is a 57 y.o. male.     9/18/17    Chief Complaint:   Fu cough, recurrent bronchitis, ILD    HPI    Pt presents w mother.  Has hx interstitial lung disease, followed by pulmonary, with negative fungal precipitins panel and IgE < 35 IU/ml.  Chest CTs have shown bilateral reticular ground-glass opacities and associated bronchiectasis.  Frequent exacerbations of cough, and shortness of breath have been frequent since about Nov 2016. Has been on abx for suspected lower resp infections about 4 times over last year. Prior to the last year, denies freq abx or freq cough or shortness of breath.  Does report occ sneezing, watery eyes worse in spring. Denies nasal congestion. Has had inreased pnd, throat clearing over last year  Denies overt GERD sx's.    He is also concerned about a chronic facial rash that he had been dealing with since his teenage years. Has upcoming dermatology brandie    Since  has had some URI and AGE sx's over last week. Improving in last 1-2 d  Denies interval abx    Has not started cellcept recommended by pulm yet      Family History   Problem Relation Age of Onset    No Known Problems Mother     No Known Problems Father     Asthma Other     Emphysema Neg Hx          Environmental History: Pets in the home: dogs (2).  Caro: tile or linoleum floors and [id number] carpeting  Tobacco Smoke in Home: no   Non-smoker    Past Medical History:   Diagnosis Date    Cough        Family History   Problem Relation Age of Onset    No Known Problems Mother     No Known Problems Father     Asthma Other     Emphysema Neg Hx    no parental atopy or asthma      Review of Systems   Constitutional: Negative for activity change, fatigue and fever.   HENT: Positive for postnasal drip and rhinorrhea. Negative for congestion, sinus pressure and sneezing.    Eyes: Negative for discharge, redness and itching.   Respiratory: Positive for cough and shortness of  breath. Negative for wheezing.    Cardiovascular: Negative for chest pain.   Gastrointestinal: Negative for constipation, diarrhea, nausea and vomiting.   Genitourinary: Negative for difficulty urinating.   Musculoskeletal: Negative for joint swelling and myalgias.   Skin: Positive for rash (on face).   Neurological: Negative for headaches.   Hematological: Does not bruise/bleed easily.   Psychiatric/Behavioral: Negative for behavioral problems and sleep disturbance.        Objective:    Physical Exam   Constitutional: He is oriented to person, place, and time. He appears well-developed and well-nourished. No distress.   HENT:   Head: Normocephalic and atraumatic.   Right Ear: Tympanic membrane and external ear normal.   Left Ear: Tympanic membrane and external ear normal.   Nose: Nose normal.   Mouth/Throat: Oropharynx is clear and moist. No oropharyngeal exudate.   Eyes: Conjunctivae are normal. Right eye exhibits no discharge. Left eye exhibits no discharge.   Neck: Normal range of motion. Neck supple. No thyromegaly present.   Cardiovascular: Normal rate and regular rhythm.    Pulmonary/Chest: Effort normal and breath sounds normal. No respiratory distress. He has no wheezes.   Abdominal: Soft. Bowel sounds are normal. He exhibits no distension. There is no tenderness.   Musculoskeletal: Normal range of motion. He exhibits no edema.   Lymphadenopathy:     He has no cervical adenopathy.   Neurological: He is alert and oriented to person, place, and time. He exhibits normal muscle tone.   Skin: Skin is warm and dry. He is not diaphoretic. No erythema.   Erythematous cheeks, forehead w few papular lesions   Psychiatric: He has a normal mood and affect. His behavior is normal. Judgment and thought content normal.     Pulmonary notes reviewed    immunoCAPs positive to DM  Nl IgGAM  Low pneumococcal titers      Assessment:       1. Abnormal antibody titer    2. Allergic rhinitis due to dust mite    3. Cough    4. ILD  (interstitial lung disease)         Plan:       1. Challenge with 23-valent pneumococcal polysaccharide vaccine, Pneumovax. Repeat pneumococcal titers in 4-6 weeks. Phone review/pt portal results. Follow up in clinic if poor response. Counseled that if good response to Pneumovax is demonstrated, expect decreased frequency and severity of mucosal infections  2. Ok cellept as per pulm  3. Dust mite avoidance  4. flonase  Keep derm appt

## 2017-10-02 NOTE — LETTER
October 2, 2017      [facility]  [address]  [facility] [address]  Phone: [phone]  Fax: [phone]       Patient: Patrick Dennis   YOB: 1960  Date of Visit: 10/02/2017    To Whom It May Concern:    Gurwinder Dennis  was at [facility] on 10/02/2017.. If you have any questions or concerns, or if I can be of further assistance, please do not hesitate to contact me.    Sincerely,        Elizabeth A Bosworth, LPN

## 2017-10-03 ENCOUNTER — OFFICE VISIT (OUTPATIENT)
Dept: DERMATOLOGY | Facility: CLINIC | Age: 57
End: 2017-10-03
Payer: COMMERCIAL

## 2017-10-03 VITALS — BODY MASS INDEX: 44.62 KG/M2 | WEIGHT: 311 LBS

## 2017-10-03 DIAGNOSIS — L73.8 SEBACEOUS GLAND HYPERPLASIA: ICD-10-CM

## 2017-10-03 DIAGNOSIS — L71.9 ROSACEA: Primary | ICD-10-CM

## 2017-10-03 PROCEDURE — 99202 OFFICE O/P NEW SF 15 MIN: CPT | Mod: S$GLB,,, | Performed by: DERMATOLOGY

## 2017-10-03 PROCEDURE — 99999 PR PBB SHADOW E&M-EST. PATIENT-LVL III: CPT | Mod: PBBFAC,,, | Performed by: DERMATOLOGY

## 2017-10-03 RX ORDER — CLINDAMYCIN PHOSPHATE 10 UG/ML
LOTION TOPICAL
Qty: 60 ML | Refills: 3 | Status: SHIPPED | OUTPATIENT
Start: 2017-10-03 | End: 2018-01-01

## 2017-10-03 RX ORDER — CLINDAMYCIN PHOSPHATE 10 UG/ML
LOTION TOPICAL
Qty: 60 ML | Refills: 3 | Status: SHIPPED | OUTPATIENT
Start: 2017-10-03 | End: 2017-10-03 | Stop reason: SDUPTHER

## 2017-10-03 NOTE — LETTER
October 3, 2017      Waqar Yan MD  [address]  [facility] [address]           [place] [facility] Dermatology  [address]  [place] [address]  Phone: [phone]  Fax: [phone]          Patient: Patrick Dennis   MR Number: [id number]   YOB: 1960   Date of Visit: 10/3/2017       Dear Dr. Waqar Yan:    Thank you for referring Patrick Dennis to me for evaluation. Attached you will find relevant portions of my assessment and plan of care.    If you have questions, please do not hesitate to call me. I look forward to following Patrick Dennis along with you.    Sincerely,    Keshia Cordero MD    Enclosure  CC:  No Recipients    If you would like to receive this communication electronically, please contact [email or web address] or [phone] to request more information on [employer] access.    For providers and/or their staff who would like to refer a patient to [facility], please contact us through our one-stop-shop provider referral line, [facility], at [phone].    If you feel you have received this communication in error or would no longer like to receive these types of communications, please e-mail [email or web address]

## 2017-10-03 NOTE — PROGRESS NOTES
Subjective:       Patient ID:  Patrick Dennis is a 57 y.o. male who presents for   Chief Complaint   Patient presents with    Rash     face     History of Present Illness: The patient presents with chief complaint of rash.  Location: face  Duration: years  Signs/Symptoms: flared recently    Prior treatments: none            Review of Systems   Constitutional: Negative for fever.   Skin: Positive for rash. Negative for itching.   Hematologic/Lymphatic: Does not bruise/bleed easily.        Objective:    Physical Exam   Constitutional: He appears well-developed and well-nourished. No distress.   Neurological: He is alert and oriented to person, place, and time. He is not disoriented.   Psychiatric: He has a normal mood and affect.   Skin:   Areas Examined (abnormalities noted in diagram):   Scalp / Hair Palpated and Inspected  Head / Face Inspection Performed  Neck Inspection Performed  Chest / Axilla Inspection Performed  RUE Inspected  LUE Inspection Performed              Diagram Legend        Yellow umbilicated papule c/w sebaceous hyperplasia        Inflammatory papules        Assessment / Plan:        Rosacea  -     clindamycin (CLEOCIN T) 1 % lotion; Use hs on face  Dispense: 60 mL; Refill: 3        Use hs with soolantra    Sebaceous gland hyperplasia  reassurance               Return in about 3 months (around 1/3/2018).

## 2017-11-21 NOTE — TELEPHONE ENCOUNTER
----- Message from Waqar Yan MD sent at 11/17/2017 10:42 AM CST -----  pls schedule fu appt. He had partial response to Pneumovax--slight improvement in titers, but could be better. I'd like to challenge maximilian Galo

## 2017-12-08 NOTE — PROGRESS NOTES
Subjective:       Patient ID:  Patrick Dennis is a 57 y.o. male who presents for   Chief Complaint   Patient presents with    Follow-up     rash     Improved with the cleocin t about too run out needs alternate tx can not afford med.         Review of Systems   Constitutional: Negative for fever.   Skin: Negative for itching and rash.   Hematologic/Lymphatic: Does not bruise/bleed easily.        Objective:    Physical Exam   Skin:   Areas Examined (abnormalities noted in diagram):   Head / Face Inspection Performed              Diagram Legend     Erythematous scaling macule/papule c/w actinic keratosis       Vascular papule c/w angioma      Pigmented verrucoid papule/plaque c/w seborrheic keratosis      Yellow umbilicated papule c/w sebaceous hyperplasia      Irregularly shaped tan macule c/w lentigo     1-2 mm smooth white papules consistent with Milia      Movable subcutaneous cyst with punctum c/w epidermal inclusion cyst      Subcutaneous movable cyst c/w pilar cyst      Firm pink to brown papule c/w dermatofibroma      Pedunculated fleshy papule(s) c/w skin tag(s)      Evenly pigmented macule c/w junctional nevus     Mildly variegated pigmented, slightly irregular-bordered macule c/w mildly atypical nevus      Flesh colored to evenly pigmented papule c/w intradermal nevus       Pink pearly papule/plaque c/w basal cell carcinoma      Erythematous hyperkeratotic cursted plaque c/w SCC      Surgical scar with no sign of skin cancer recurrence      Open and closed comedones      Inflammatory papules and pustules      Verrucoid papule consistent consistent with wart     Erythematous eczematous patches and plaques     Dystrophic onycholytic nail with subungual debris c/w onychomycosis     Umbilicated papule    Erythematous-base heme-crusted tan verrucoid plaque consistent with inflamed seborrheic keratosis     Erythematous Silvery Scaling Plaque c/w Psoriasis     See annotation      Assessment / Plan:         Rosacea  -     azelaic acid (FINACEA) 15 % Foam; Use hs on face  Dispense: 50 g; Refill: 3  Cleocin is too expensive, $80             Return if symptoms worsen or fail to improve.

## 2017-12-11 NOTE — LETTER
December 11, 2017      [facility] - Allergy/ Immunology  [address]  [facility] [address]  Phone: [phone]  Fax: [phone]       Patient: Patrick Dennis   YOB: 1960  Date of Visit: 12/11/2017    To Whom It May Concern:    Gurwinder Dennis  was at [facility] on 12/11/2017. Patrick may return to work on 12/12/17 with no restrictions. If you have any questions or concerns, or if I can be of further assistance, please do not hesitate to contact me.    Sincerely,        Elizabeth A Bosworth, LPN

## 2017-12-11 NOTE — PROGRESS NOTES
Subjective:       Patient ID: Patrick Dennis is a 57 y.o. male.     10/2/17    Chief Complaint:   Fu cough, recurrent bronchitis, ILD    Shortness of Breath   Associated symptoms include a rash. Pertinent negatives include no chest pain, fever, headaches, rhinorrhea, vomiting or wheezing.       Pt presents w mother.  Has hx interstitial lung disease, followed by pulmonary, with negative fungal precipitins panel and IgE < 35 IU/ml.  Chest CTs have shown bilateral reticular ground-glass opacities and associated bronchiectasis. Has been on cellcept now x ~2 mo, rx'd by pulmonary. Can't tell that cough is sig improved. Due for follow up.  Was challenged w Pneumovax at last visit. Had only partial response.  Denies any interval need abx. Recent cough more c/w frequent throat clearing. Has been advised to stay off reflux meds while on cellcept.  Reports about 20 lb weight loss over last few months.      Family History   Problem Relation Age of Onset    No Known Problems Mother     No Known Problems Father     Asthma Other     Emphysema Neg Hx          Environmental History: Pets in the home: dogs (2).  Caro: tile or linoleum floors and [id number] carpeting  Tobacco Smoke in Home: no   Non-smoker    Past Medical History:   Diagnosis Date    Cough    rosacea--followed by dermatology    Family History   Problem Relation Age of Onset    No Known Problems Mother     No Known Problems Father     Asthma Other     Emphysema Neg Hx    no parental atopy or asthma      Review of Systems   Constitutional: Negative for activity change, fatigue and fever.   HENT: Positive for postnasal drip. Negative for congestion, rhinorrhea, sinus pressure and sneezing.    Eyes: Negative for discharge, redness and itching.   Respiratory: Positive for cough and shortness of breath. Negative for wheezing.    Cardiovascular: Negative for chest pain.   Gastrointestinal: Negative for constipation, diarrhea, nausea and vomiting.    Genitourinary: Negative for difficulty urinating.   Musculoskeletal: Negative for joint swelling and myalgias.   Skin: Positive for rash.   Neurological: Negative for headaches.   Hematological: Does not bruise/bleed easily.   Psychiatric/Behavioral: Negative for behavioral problems and sleep disturbance.        Objective:    Physical Exam   Constitutional: He is oriented to person, place, and time. He appears well-developed and well-nourished. No distress.   HENT:   Head: Normocephalic and atraumatic.   Right Ear: Tympanic membrane and external ear normal.   Left Ear: Tympanic membrane and external ear normal.   Nose: Nose normal.   Mouth/Throat: Oropharynx is clear and moist. No oropharyngeal exudate.   Eyes: Conjunctivae are normal. Right eye exhibits no discharge. Left eye exhibits no discharge.   Neck: Normal range of motion. Neck supple. No thyromegaly present.   Cardiovascular: Normal rate and regular rhythm.    Pulmonary/Chest: Effort normal and breath sounds normal. No respiratory distress. He has no wheezes.   Abdominal: Soft. Bowel sounds are normal. He exhibits no distension. There is no tenderness.   Musculoskeletal: Normal range of motion. He exhibits no edema.   Lymphadenopathy:     He has no cervical adenopathy.   Neurological: He is alert and oriented to person, place, and time. He exhibits normal muscle tone.   Skin: Skin is warm and dry. He is not diaphoretic. No erythema.   Erythematous cheeks, forehead w few papular lesions   Psychiatric: He has a normal mood and affect. His behavior is normal. Judgment and thought content normal.         immunoCAPs positive to DM  Nl IgGAM  Low pneumococcal titers  Results for TIFFANIE OGLESBY (MRN [id number]) as of 12/12/2017 08:22   Ref. Range 9/18/2017 11:54 11/11/2017 10:11   S.pneumoniae Type 1 Latest Units: mcg/mL 0.6 1.1   S.pneumoniae Type 3 Latest Units: mcg/mL 1.1 1.2   Strep pneumo Type 4 Latest Units: mcg/mL <0.3 <0.3   S.pneumoniae Type 5  Latest Units: mcg/mL 0.3 0.7   S.pneumoniae Type 6B Latest Units: mcg/mL <0.3 0.6   S.pneumoniae Type 7F Latest Units: mcg/mL 0.3 0.9   S.pneumoniae Type 8 Latest Units: mcg/mL 0.5 2.3   S.pneumoniae Type 9N Latest Units: mcg/mL <0.3 0.6   S.pneumoniae Type 9V Abs Latest Units: mcg/mL 1.5 5.5   S.pneumoniae Type 12F Latest Units: mcg/mL <0.3 <0.3   Strep pneumo Type 14 Latest Units: mcg/mL 4.2 64.8   S.pneumoniae Type 18C Latest Units: mcg/mL 1.4 38.5   S.pneumoniae Type 19F Latest Units: mcg/mL 0.7 2.7   S.pneumoniae Type 23F Latest Units: mcg/mL <0.3 0.3   Partial pneumovax response    Assessment:       1. Abnormal antibody titer--partial pneumovax response    2. ILD (interstitial lung disease)    3. Chronic rhinitis, unspecified type    4. Exertional dyspnea         Plan:       1. Challenge with 13-valent pneumococcal protein conjugate vaccine, Prevnar. Repeat pneumococcal titers in 4-6 weeks. Phone review/pt portal results. Follow up in clinic if poor response. Counseled that if good response to Prevnar is demonstrated, expect decreased frequency and severity of mucosal infections  2. Ok cellept as per pulm--fu w pulmonary  3. Dust mite avoidance  4. flonase

## 2017-12-12 NOTE — TELEPHONE ENCOUNTER
----- Message from Marti Silverman sent at 12/12/2017  2:27 PM CST -----  Contact: Valentina / Wife [phone]  Valentina states PT is not any better from his last visit. She is requesting a call at [phone].

## 2018-01-01 ENCOUNTER — TELEPHONE (OUTPATIENT)
Dept: PULMONOLOGY | Facility: CLINIC | Age: 58
End: 2018-01-01

## 2018-01-01 ENCOUNTER — HOSPITAL ENCOUNTER (OUTPATIENT)
Dept: PULMONOLOGY | Facility: CLINIC | Age: 58
Discharge: HOME | End: 2018-09-17
Payer: COMMERCIAL

## 2018-01-01 ENCOUNTER — HOSPITAL ENCOUNTER (OUTPATIENT)
Dept: PULMONOLOGY | Facility: CLINIC | Age: 58
Discharge: HOME | End: 2018-01-05
Attending: INTERNAL MEDICINE
Payer: COMMERCIAL

## 2018-01-01 ENCOUNTER — TELEPHONE (OUTPATIENT)
Dept: ALLERGY | Facility: CLINIC | Age: 58
End: 2018-01-01

## 2018-01-01 ENCOUNTER — OFFICE VISIT (OUTPATIENT)
Dept: PULMONOLOGY | Facility: CLINIC | Age: 58
End: 2018-01-01
Payer: COMMERCIAL

## 2018-01-01 ENCOUNTER — LAB VISIT (OUTPATIENT)
Dept: LAB | Facility: HOSPITAL | Age: 58
End: 2018-01-01
Attending: INTERNAL MEDICINE
Payer: COMMERCIAL

## 2018-01-01 ENCOUNTER — PATIENT MESSAGE (OUTPATIENT)
Dept: PULMONOLOGY | Facility: CLINIC | Age: 58
End: 2018-01-01

## 2018-01-01 ENCOUNTER — HOSPITAL ENCOUNTER (OUTPATIENT)
Dept: PULMONOLOGY | Facility: CLINIC | Age: 58
Discharge: HOME | End: 2018-04-09
Payer: COMMERCIAL

## 2018-01-01 ENCOUNTER — TELEPHONE (OUTPATIENT)
Dept: TRANSPLANT | Facility: CLINIC | Age: 58
End: 2018-01-01

## 2018-01-01 ENCOUNTER — HOSPITAL ENCOUNTER (OUTPATIENT)
Dept: RADIOLOGY | Facility: HOSPITAL | Age: 58
Discharge: HOME | End: 2018-04-17
Attending: INTERNAL MEDICINE
Payer: COMMERCIAL

## 2018-01-01 ENCOUNTER — INFUSION (OUTPATIENT)
Dept: INFECTIOUS DISEASES | Facility: HOSPITAL | Age: 58
End: 2018-01-01
Attending: INTERNAL MEDICINE
Payer: COMMERCIAL

## 2018-01-01 ENCOUNTER — PATIENT OUTREACH (OUTPATIENT)
Dept: ADMINISTRATIVE | Facility: CLINIC | Age: 58
End: 2018-01-01

## 2018-01-01 ENCOUNTER — HOSPITAL ENCOUNTER (OUTPATIENT)
Dept: PULMONOLOGY | Facility: CLINIC | Age: 58
Discharge: HOME | End: 2018-01-05
Payer: COMMERCIAL

## 2018-01-01 ENCOUNTER — HOSPITAL ENCOUNTER (OUTPATIENT)
Dept: RADIOLOGY | Facility: HOSPITAL | Age: 58
Discharge: HOME | End: 2018-09-18
Attending: INTERNAL MEDICINE
Payer: COMMERCIAL

## 2018-01-01 ENCOUNTER — OFFICE VISIT (OUTPATIENT)
Dept: ALLERGY | Facility: CLINIC | Age: 58
End: 2018-01-01
Payer: COMMERCIAL

## 2018-01-01 ENCOUNTER — CLINICAL SUPPORT (OUTPATIENT)
Dept: CARDIOLOGY | Facility: CLINIC | Age: 58
End: 2018-01-01
Attending: INTERNAL MEDICINE
Payer: COMMERCIAL

## 2018-01-01 ENCOUNTER — TELEPHONE (OUTPATIENT)
Dept: CARDIOLOGY | Facility: CLINIC | Age: 58
End: 2018-01-01

## 2018-01-01 ENCOUNTER — LAB VISIT (OUTPATIENT)
Dept: LAB | Facility: HOSPITAL | Age: 58
End: 2018-01-01
Attending: ALLERGY & IMMUNOLOGY
Payer: COMMERCIAL

## 2018-01-01 ENCOUNTER — TELEPHONE (OUTPATIENT)
Dept: INFECTIOUS DISEASES | Facility: HOSPITAL | Age: 58
End: 2018-01-01

## 2018-01-01 ENCOUNTER — ANESTHESIA EVENT (OUTPATIENT)
Dept: CARDIOLOGY | Facility: HOSPITAL | Age: 58
DRG: 291 | End: 2018-01-01
Payer: COMMERCIAL

## 2018-01-01 ENCOUNTER — HOSPITAL ENCOUNTER (OUTPATIENT)
Dept: RADIOLOGY | Facility: HOSPITAL | Age: 58
Discharge: HOME | End: 2018-08-20
Attending: INTERNAL MEDICINE
Payer: COMMERCIAL

## 2018-01-01 ENCOUNTER — HOSPITAL ENCOUNTER (INPATIENT)
Facility: HOSPITAL | Age: 58
LOS: 5 days | Discharge: HOME | DRG: 189 | End: 2018-10-01
Attending: EMERGENCY MEDICINE | Admitting: HOSPITALIST
Payer: COMMERCIAL

## 2018-01-01 ENCOUNTER — INITIAL CONSULT (OUTPATIENT)
Dept: TRANSPLANT | Facility: CLINIC | Age: 58
End: 2018-01-01
Payer: COMMERCIAL

## 2018-01-01 ENCOUNTER — HOSPITAL ENCOUNTER (INPATIENT)
Facility: HOSPITAL | Age: 58
LOS: 3 days | DRG: 291 | End: 2018-10-08
Attending: EMERGENCY MEDICINE | Admitting: HOSPITALIST
Payer: COMMERCIAL

## 2018-01-01 ENCOUNTER — ANESTHESIA (OUTPATIENT)
Dept: CARDIOLOGY | Facility: HOSPITAL | Age: 58
DRG: 291 | End: 2018-01-01
Payer: COMMERCIAL

## 2018-01-01 ENCOUNTER — TELEPHONE (OUTPATIENT)
Dept: BARIATRICS | Facility: CLINIC | Age: 58
End: 2018-01-01

## 2018-01-01 VITALS
OXYGEN SATURATION: 94 % | DIASTOLIC BLOOD PRESSURE: 74 MMHG | DIASTOLIC BLOOD PRESSURE: 80 MMHG | OXYGEN SATURATION: 93 % | SYSTOLIC BLOOD PRESSURE: 119 MMHG | HEIGHT: 69 IN | RESPIRATION RATE: 18 BRPM | WEIGHT: 278.13 LBS | SYSTOLIC BLOOD PRESSURE: 140 MMHG | HEART RATE: 89 BPM | BODY MASS INDEX: 41.07 KG/M2 | RESPIRATION RATE: 25 BRPM | TEMPERATURE: 99 F | BODY MASS INDEX: 41.34 KG/M2 | HEART RATE: 108 BPM | WEIGHT: 279.13 LBS

## 2018-01-01 VITALS
DIASTOLIC BLOOD PRESSURE: 44 MMHG | WEIGHT: 235.88 LBS | OXYGEN SATURATION: 89 % | TEMPERATURE: 97 F | SYSTOLIC BLOOD PRESSURE: 64 MMHG | HEIGHT: 70 IN | BODY MASS INDEX: 33.77 KG/M2

## 2018-01-01 VITALS
OXYGEN SATURATION: 93 % | WEIGHT: 300 LBS | DIASTOLIC BLOOD PRESSURE: 84 MMHG | SYSTOLIC BLOOD PRESSURE: 122 MMHG | HEART RATE: 105 BPM | HEIGHT: 69 IN | BODY MASS INDEX: 44.43 KG/M2 | RESPIRATION RATE: 16 BRPM

## 2018-01-01 VITALS
WEIGHT: 263 LBS | HEART RATE: 101 BPM | OXYGEN SATURATION: 94 % | SYSTOLIC BLOOD PRESSURE: 94 MMHG | WEIGHT: 269 LBS | OXYGEN SATURATION: 94 % | TEMPERATURE: 97 F | BODY MASS INDEX: 38.51 KG/M2 | HEIGHT: 70 IN | SYSTOLIC BLOOD PRESSURE: 100 MMHG | RESPIRATION RATE: 20 BRPM | HEART RATE: 100 BPM | DIASTOLIC BLOOD PRESSURE: 64 MMHG | BODY MASS INDEX: 37.65 KG/M2 | DIASTOLIC BLOOD PRESSURE: 61 MMHG | HEIGHT: 70 IN

## 2018-01-01 VITALS
RESPIRATION RATE: 16 BRPM | OXYGEN SATURATION: 88 % | HEART RATE: 107 BPM | SYSTOLIC BLOOD PRESSURE: 118 MMHG | BODY MASS INDEX: 40.71 KG/M2 | HEIGHT: 70 IN | WEIGHT: 284.38 LBS | DIASTOLIC BLOOD PRESSURE: 78 MMHG

## 2018-01-01 VITALS
DIASTOLIC BLOOD PRESSURE: 92 MMHG | BODY MASS INDEX: 42.61 KG/M2 | SYSTOLIC BLOOD PRESSURE: 140 MMHG | WEIGHT: 297.63 LBS | HEIGHT: 70 IN

## 2018-01-01 VITALS — HEIGHT: 70 IN | BODY MASS INDEX: 37.78 KG/M2 | WEIGHT: 263.88 LBS

## 2018-01-01 VITALS
BODY MASS INDEX: 39.67 KG/M2 | WEIGHT: 277.13 LBS | HEART RATE: 107 BPM | RESPIRATION RATE: 16 BRPM | OXYGEN SATURATION: 90 % | HEIGHT: 70 IN

## 2018-01-01 VITALS
HEIGHT: 70 IN | RESPIRATION RATE: 19 BRPM | BODY MASS INDEX: 35.42 KG/M2 | WEIGHT: 247.38 LBS | SYSTOLIC BLOOD PRESSURE: 117 MMHG | DIASTOLIC BLOOD PRESSURE: 80 MMHG | HEART RATE: 85 BPM | OXYGEN SATURATION: 95 % | TEMPERATURE: 97 F

## 2018-01-01 VITALS
HEIGHT: 70 IN | WEIGHT: 271.19 LBS | SYSTOLIC BLOOD PRESSURE: 108 MMHG | BODY MASS INDEX: 38.82 KG/M2 | HEART RATE: 111 BPM | OXYGEN SATURATION: 85 % | RESPIRATION RATE: 18 BRPM | DIASTOLIC BLOOD PRESSURE: 68 MMHG

## 2018-01-01 VITALS
OXYGEN SATURATION: 86 % | BODY MASS INDEX: 40.21 KG/M2 | HEART RATE: 110 BPM | WEIGHT: 280.19 LBS | SYSTOLIC BLOOD PRESSURE: 130 MMHG | DIASTOLIC BLOOD PRESSURE: 82 MMHG

## 2018-01-01 DIAGNOSIS — J84.9 ILD (INTERSTITIAL LUNG DISEASE): ICD-10-CM

## 2018-01-01 DIAGNOSIS — E66.01 MORBID OBESITY: Primary | ICD-10-CM

## 2018-01-01 DIAGNOSIS — J84.9 ILD (INTERSTITIAL LUNG DISEASE): Primary | ICD-10-CM

## 2018-01-01 DIAGNOSIS — R60.0 PEDAL EDEMA: ICD-10-CM

## 2018-01-01 DIAGNOSIS — R06.02 SHORTNESS OF BREATH: ICD-10-CM

## 2018-01-01 DIAGNOSIS — J84.112 IPF (IDIOPATHIC PULMONARY FIBROSIS): ICD-10-CM

## 2018-01-01 DIAGNOSIS — D80.6 ANTI-POLYSACCHARIDE ANTIBODY DEFICIENCY: ICD-10-CM

## 2018-01-01 DIAGNOSIS — R06.09 EXERTIONAL DYSPNEA: ICD-10-CM

## 2018-01-01 DIAGNOSIS — J84.9 INTERSTITIAL LUNG DISEASE: ICD-10-CM

## 2018-01-01 DIAGNOSIS — J20.9 ACUTE BRONCHITIS, UNSPECIFIED ORGANISM: ICD-10-CM

## 2018-01-01 DIAGNOSIS — I07.1 MODERATE TRICUSPID REGURGITATION: ICD-10-CM

## 2018-01-01 DIAGNOSIS — R60.0 EDEMA, LOWER EXTREMITY: Primary | ICD-10-CM

## 2018-01-01 DIAGNOSIS — J96.11 CHRONIC RESPIRATORY FAILURE WITH HYPOXIA: ICD-10-CM

## 2018-01-01 DIAGNOSIS — I27.20 PULMONARY HYPERTENSION, UNSPECIFIED: ICD-10-CM

## 2018-01-01 DIAGNOSIS — I51.7 BIATRIAL ENLARGEMENT: ICD-10-CM

## 2018-01-01 DIAGNOSIS — Z76.82 LUNG TRANSPLANT CANDIDATE: ICD-10-CM

## 2018-01-01 DIAGNOSIS — R07.89 CHEST TIGHTNESS OR PRESSURE: ICD-10-CM

## 2018-01-01 DIAGNOSIS — E66.01 MORBID OBESITY DUE TO EXCESS CALORIES: ICD-10-CM

## 2018-01-01 DIAGNOSIS — R07.9 CHEST PAIN: ICD-10-CM

## 2018-01-01 DIAGNOSIS — R76.0 ABNORMAL ANTIBODY TITER: ICD-10-CM

## 2018-01-01 DIAGNOSIS — J96.11 CHRONIC HYPOXEMIC RESPIRATORY FAILURE: ICD-10-CM

## 2018-01-01 DIAGNOSIS — J96.11 CHRONIC RESPIRATORY FAILURE WITH HYPOXIA: Primary | ICD-10-CM

## 2018-01-01 DIAGNOSIS — D80.6 ANTI-POLYSACCHARIDE ANTIBODY DEFICIENCY: Primary | ICD-10-CM

## 2018-01-01 DIAGNOSIS — R73.03 PREDIABETES: ICD-10-CM

## 2018-01-01 DIAGNOSIS — J31.0 CHRONIC RHINITIS: ICD-10-CM

## 2018-01-01 DIAGNOSIS — I27.81 COR PULMONALE: ICD-10-CM

## 2018-01-01 DIAGNOSIS — I50.9 CONGESTIVE HEART FAILURE, UNSPECIFIED HF CHRONICITY, UNSPECIFIED HEART FAILURE TYPE: Primary | ICD-10-CM

## 2018-01-01 DIAGNOSIS — J30.89 ALLERGIC RHINITIS DUE TO DUST MITE: ICD-10-CM

## 2018-01-01 DIAGNOSIS — I50.32 CHRONIC DIASTOLIC CONGESTIVE HEART FAILURE: ICD-10-CM

## 2018-01-01 DIAGNOSIS — I50.813 ACUTE ON CHRONIC RIGHT HEART FAILURE: ICD-10-CM

## 2018-01-01 DIAGNOSIS — J96.10 CHRONIC RESPIRATORY FAILURE, UNSPECIFIED WHETHER WITH HYPOXIA OR HYPERCAPNIA: ICD-10-CM

## 2018-01-01 DIAGNOSIS — J96.21 ACUTE ON CHRONIC RESPIRATORY FAILURE WITH HYPOXIA: ICD-10-CM

## 2018-01-01 DIAGNOSIS — I50.810 RIGHT VENTRICULAR FAILURE: ICD-10-CM

## 2018-01-01 DIAGNOSIS — J96.21 ACUTE ON CHRONIC RESPIRATORY FAILURE WITH HYPOXIA: Primary | ICD-10-CM

## 2018-01-01 DIAGNOSIS — I27.81 COR PULMONALE: Primary | ICD-10-CM

## 2018-01-01 DIAGNOSIS — J84.112 IPF (IDIOPATHIC PULMONARY FIBROSIS): Primary | ICD-10-CM

## 2018-01-01 DIAGNOSIS — R04.0 EPISTAXIS: ICD-10-CM

## 2018-01-01 DIAGNOSIS — R06.02 SOB (SHORTNESS OF BREATH) ON EXERTION: ICD-10-CM

## 2018-01-01 DIAGNOSIS — I50.9 ACUTE ON CHRONIC CONGESTIVE HEART FAILURE, UNSPECIFIED HEART FAILURE TYPE: ICD-10-CM

## 2018-01-01 DIAGNOSIS — I27.20 PULMONARY HYPERTENSION: ICD-10-CM

## 2018-01-01 DIAGNOSIS — R60.0 EDEMA, LOWER EXTREMITY: ICD-10-CM

## 2018-01-01 DIAGNOSIS — R09.02 HYPOXIA: ICD-10-CM

## 2018-01-01 LAB
ALBUMIN SERPL BCP-MCNC: 3.4 G/DL
ALBUMIN SERPL BCP-MCNC: 3.4 G/DL
ALBUMIN SERPL BCP-MCNC: 3.5 G/DL
ALBUMIN SERPL BCP-MCNC: 3.6 G/DL
ALBUMIN SERPL BCP-MCNC: 3.6 G/DL
ALBUMIN SERPL BCP-MCNC: 3.8 G/DL
ALLENS TEST: ABNORMAL
ALP SERPL-CCNC: 100 U/L
ALP SERPL-CCNC: 70 U/L
ALP SERPL-CCNC: 75 U/L
ALP SERPL-CCNC: 77 U/L
ALP SERPL-CCNC: 80 U/L
ALP SERPL-CCNC: 99 U/L
ALT SERPL W/O P-5'-P-CCNC: 1184 U/L
ALT SERPL W/O P-5'-P-CCNC: 14 U/L
ALT SERPL W/O P-5'-P-CCNC: 18 U/L
ALT SERPL W/O P-5'-P-CCNC: 19 U/L
ALT SERPL W/O P-5'-P-CCNC: 22 U/L
ALT SERPL W/O P-5'-P-CCNC: 27 U/L
AMPHET+METHAMPHET UR QL: NEGATIVE
ANABASINE UR-MCNC: <2 NG/ML
ANION GAP SERPL CALC-SCNC: 10 MMOL/L
ANION GAP SERPL CALC-SCNC: 10 MMOL/L
ANION GAP SERPL CALC-SCNC: 11 MMOL/L
ANION GAP SERPL CALC-SCNC: 12 MMOL/L
ANION GAP SERPL CALC-SCNC: 13 MMOL/L
ANION GAP SERPL CALC-SCNC: 13 MMOL/L
ANION GAP SERPL CALC-SCNC: 14 MMOL/L
ANION GAP SERPL CALC-SCNC: 18 MMOL/L
ANION GAP SERPL CALC-SCNC: 22 MMOL/L
ANION GAP SERPL CALC-SCNC: 8 MMOL/L
ANION GAP SERPL CALC-SCNC: 9 MMOL/L
AST SERPL-CCNC: 1484 U/L
AST SERPL-CCNC: 19 U/L
AST SERPL-CCNC: 20 U/L
AST SERPL-CCNC: 23 U/L
AST SERPL-CCNC: 24 U/L
AST SERPL-CCNC: 26 U/L
BACTERIA #/AREA URNS HPF: ABNORMAL /HPF
BACTERIA BLD CULT: NORMAL
BARBITURATES UR QL SCN>200 NG/ML: NEGATIVE
BASOPHILS # BLD AUTO: 0 K/UL
BASOPHILS # BLD AUTO: 0.01 K/UL
BASOPHILS # BLD AUTO: 0.02 K/UL
BASOPHILS # BLD AUTO: 0.03 K/UL
BASOPHILS # BLD AUTO: 0.03 K/UL
BASOPHILS # BLD AUTO: 0.04 K/UL
BASOPHILS # BLD AUTO: 0.05 K/UL
BASOPHILS NFR BLD: 0 %
BASOPHILS NFR BLD: 0.1 %
BASOPHILS NFR BLD: 0.2 %
BASOPHILS NFR BLD: 0.3 %
BASOPHILS NFR BLD: 0.5 %
BENZODIAZ UR QL SCN>200 NG/ML: NEGATIVE
BILIRUB DIRECT SERPL-MCNC: 0.3 MG/DL
BILIRUB DIRECT SERPL-MCNC: 0.7 MG/DL
BILIRUB SERPL-MCNC: 0.7 MG/DL
BILIRUB SERPL-MCNC: 1.5 MG/DL
BILIRUB SERPL-MCNC: 2.2 MG/DL
BILIRUB SERPL-MCNC: 2.6 MG/DL
BILIRUB UR QL STRIP: ABNORMAL
BILIRUB UR QL STRIP: NEGATIVE
BNP SERPL-MCNC: 1110 PG/ML
BNP SERPL-MCNC: 1389 PG/ML
BUN SERPL-MCNC: 12 MG/DL
BUN SERPL-MCNC: 17 MG/DL
BUN SERPL-MCNC: 18 MG/DL
BUN SERPL-MCNC: 19 MG/DL
BUN SERPL-MCNC: 20 MG/DL
BUN SERPL-MCNC: 21 MG/DL
BUN SERPL-MCNC: 28 MG/DL
BUN SERPL-MCNC: 33 MG/DL
BUN SERPL-MCNC: 38 MG/DL
BUN SERPL-MCNC: 42 MG/DL
BUN SERPL-MCNC: 47 MG/DL
BZE UR QL SCN: NEGATIVE
CALCIUM SERPL-MCNC: 10 MG/DL
CALCIUM SERPL-MCNC: 10.1 MG/DL
CALCIUM SERPL-MCNC: 10.2 MG/DL
CALCIUM SERPL-MCNC: 9.6 MG/DL
CALCIUM SERPL-MCNC: 9.7 MG/DL
CALCIUM SERPL-MCNC: 9.8 MG/DL
CALCIUM SERPL-MCNC: 9.9 MG/DL
CANNABINOIDS UR QL SCN: NEGATIVE
CHLORIDE SERPL-SCNC: 83 MMOL/L
CHLORIDE SERPL-SCNC: 84 MMOL/L
CHLORIDE SERPL-SCNC: 85 MMOL/L
CHLORIDE SERPL-SCNC: 86 MMOL/L
CHLORIDE SERPL-SCNC: 87 MMOL/L
CHLORIDE SERPL-SCNC: 88 MMOL/L
CHLORIDE SERPL-SCNC: 88 MMOL/L
CHLORIDE SERPL-SCNC: 89 MMOL/L
CHLORIDE SERPL-SCNC: 90 MMOL/L
CHLORIDE SERPL-SCNC: 90 MMOL/L
CHLORIDE SERPL-SCNC: 97 MMOL/L
CLARITY UR REFRACT.AUTO: CLEAR
CLARITY UR: CLEAR
CO2 SERPL-SCNC: 28 MMOL/L
CO2 SERPL-SCNC: 31 MMOL/L
CO2 SERPL-SCNC: 31 MMOL/L
CO2 SERPL-SCNC: 32 MMOL/L
CO2 SERPL-SCNC: 35 MMOL/L
CO2 SERPL-SCNC: 36 MMOL/L
CO2 SERPL-SCNC: 36 MMOL/L
CO2 SERPL-SCNC: 37 MMOL/L
CO2 SERPL-SCNC: 39 MMOL/L
CO2 SERPL-SCNC: 40 MMOL/L
CO2 SERPL-SCNC: 41 MMOL/L
CO2 SERPL-SCNC: 42 MMOL/L
CO2 SERPL-SCNC: 44 MMOL/L
COLOR UR AUTO: YELLOW
COLOR UR: YELLOW
COTININE UR-MCNC: <5 NG/ML
CREAT SERPL-MCNC: 0.8 MG/DL
CREAT SERPL-MCNC: 0.8 MG/DL
CREAT SERPL-MCNC: 0.9 MG/DL
CREAT SERPL-MCNC: 1 MG/DL
CREAT SERPL-MCNC: 1.1 MG/DL
CREAT SERPL-MCNC: 1.2 MG/DL
CREAT SERPL-MCNC: 1.7 MG/DL
CREAT UR-MCNC: 46 MG/DL
D DIMER PPP IA.FEU-MCNC: 0.64 MG/L FEU
DELSYS: ABNORMAL
DEPRECATED S PNEUM 1 IGG SER-MCNC: 1 MCG/ML
DEPRECATED S PNEUM12 IGG SER-MCNC: <0.3 MCG/ML
DEPRECATED S PNEUM14 IGG SER-MCNC: 61.6 MCG/ML
DEPRECATED S PNEUM19 IGG SER-MCNC: 3.3 MCG/ML
DEPRECATED S PNEUM23 IGG SER-MCNC: <0.3 MCG/ML
DEPRECATED S PNEUM3 IGG SER-MCNC: 1.2 MCG/ML
DEPRECATED S PNEUM4 IGG SER-MCNC: <0.3 MCG/ML
DEPRECATED S PNEUM5 IGG SER-MCNC: 0.4 MCG/ML
DEPRECATED S PNEUM8 IGG SER-MCNC: 2.1 MCG/ML
DEPRECATED S PNEUM9 IGG SER-MCNC: 0.5 MCG/ML
DIFFERENTIAL METHOD: ABNORMAL
EOSINOPHIL # BLD AUTO: 0 K/UL
EOSINOPHIL # BLD AUTO: 0.2 K/UL
EOSINOPHIL # BLD AUTO: 0.2 K/UL
EOSINOPHIL # BLD AUTO: 0.3 K/UL
EOSINOPHIL # BLD AUTO: 0.4 K/UL
EOSINOPHIL NFR BLD: 0 %
EOSINOPHIL NFR BLD: 0 %
EOSINOPHIL NFR BLD: 0.3 %
EOSINOPHIL NFR BLD: 2.5 %
EOSINOPHIL NFR BLD: 3 %
EOSINOPHIL NFR BLD: 3.3 %
EOSINOPHIL NFR BLD: 4.5 %
ERYTHROCYTE [DISTWIDTH] IN BLOOD BY AUTOMATED COUNT: 13.2 %
ERYTHROCYTE [DISTWIDTH] IN BLOOD BY AUTOMATED COUNT: 13.3 %
ERYTHROCYTE [DISTWIDTH] IN BLOOD BY AUTOMATED COUNT: 13.4 %
ERYTHROCYTE [DISTWIDTH] IN BLOOD BY AUTOMATED COUNT: 13.4 %
ERYTHROCYTE [DISTWIDTH] IN BLOOD BY AUTOMATED COUNT: 13.5 %
ERYTHROCYTE [DISTWIDTH] IN BLOOD BY AUTOMATED COUNT: 13.5 %
ERYTHROCYTE [DISTWIDTH] IN BLOOD BY AUTOMATED COUNT: 13.6 %
ERYTHROCYTE [SEDIMENTATION RATE] IN BLOOD BY WESTERGREN METHOD: 24 MM/H
EST. GFR  (AFRICAN AMERICAN): 51 ML/MIN/1.73 M^2
EST. GFR  (AFRICAN AMERICAN): >60 ML/MIN/1.73 M^2
EST. GFR  (NON AFRICAN AMERICAN): 44 ML/MIN/1.73 M^2
EST. GFR  (NON AFRICAN AMERICAN): >60 ML/MIN/1.73 M^2
ESTIMATED AVG GLUCOSE: 123 MG/DL
ESTIMATED PA SYSTOLIC PRESSURE: 90.34
ETHANOL UR-MCNC: <10 MG/DL
FIO2: 100
FIO2: 40
FLOW: 15
FLOW: 5
GLUCOSE SERPL-MCNC: 103 MG/DL
GLUCOSE SERPL-MCNC: 119 MG/DL
GLUCOSE SERPL-MCNC: 121 MG/DL
GLUCOSE SERPL-MCNC: 128 MG/DL
GLUCOSE SERPL-MCNC: 129 MG/DL
GLUCOSE SERPL-MCNC: 132 MG/DL
GLUCOSE SERPL-MCNC: 134 MG/DL
GLUCOSE SERPL-MCNC: 134 MG/DL
GLUCOSE SERPL-MCNC: 138 MG/DL
GLUCOSE SERPL-MCNC: 148 MG/DL
GLUCOSE SERPL-MCNC: 149 MG/DL
GLUCOSE SERPL-MCNC: 151 MG/DL
GLUCOSE SERPL-MCNC: 153 MG/DL
GLUCOSE SERPL-MCNC: 163 MG/DL
GLUCOSE SERPL-MCNC: 175 MG/DL
GLUCOSE SERPL-MCNC: 87 MG/DL
GLUCOSE SERPL-MCNC: 97 MG/DL
GLUCOSE UR QL STRIP: NEGATIVE
GLUCOSE UR QL STRIP: NEGATIVE
HBA1C MFR BLD HPLC: 5.9 %
HCO3 UR-SCNC: 27.3 MMOL/L (ref 24–28)
HCO3 UR-SCNC: 37.7 MMOL/L (ref 24–28)
HCO3 UR-SCNC: 37.9 MMOL/L (ref 24–28)
HCO3 UR-SCNC: 42 MMOL/L (ref 24–28)
HCT VFR BLD AUTO: 43.8 %
HCT VFR BLD AUTO: 49.2 %
HCT VFR BLD AUTO: 49.6 %
HCT VFR BLD AUTO: 50.3 %
HCT VFR BLD AUTO: 50.7 %
HCT VFR BLD AUTO: 51.7 %
HCT VFR BLD AUTO: 52.1 %
HGB BLD-MCNC: 13.6 G/DL
HGB BLD-MCNC: 15 G/DL
HGB BLD-MCNC: 15.1 G/DL
HGB BLD-MCNC: 15.3 G/DL
HGB BLD-MCNC: 15.3 G/DL
HGB BLD-MCNC: 15.6 G/DL
HGB BLD-MCNC: 15.7 G/DL
HGB UR QL STRIP: NEGATIVE
HGB UR QL STRIP: NEGATIVE
HYALINE CASTS #/AREA URNS LPF: 12 /LPF
IMM GRANULOCYTES # BLD AUTO: 0.01 K/UL
IMM GRANULOCYTES # BLD AUTO: 0.01 K/UL
IMM GRANULOCYTES # BLD AUTO: 0.03 K/UL
IMM GRANULOCYTES # BLD AUTO: 0.05 K/UL
IMM GRANULOCYTES NFR BLD AUTO: 0.1 %
IMM GRANULOCYTES NFR BLD AUTO: 0.3 %
IMM GRANULOCYTES NFR BLD AUTO: 0.8 %
KETONES UR QL STRIP: NEGATIVE
KETONES UR QL STRIP: NEGATIVE
L PNEUMO AG UR QL IA: NOT DETECTED
LACTATE SERPL-SCNC: 7.2 MMOL/L
LEUKOCYTE ESTERASE UR QL STRIP: NEGATIVE
LEUKOCYTE ESTERASE UR QL STRIP: NEGATIVE
LIPASE SERPL-CCNC: 114 U/L
LYMPHOCYTES # BLD AUTO: 0.5 K/UL
LYMPHOCYTES # BLD AUTO: 1 K/UL
LYMPHOCYTES # BLD AUTO: 1.3 K/UL
LYMPHOCYTES # BLD AUTO: 1.3 K/UL
LYMPHOCYTES # BLD AUTO: 1.8 K/UL
LYMPHOCYTES # BLD AUTO: 1.9 K/UL
LYMPHOCYTES # BLD AUTO: 2 K/UL
LYMPHOCYTES NFR BLD: 14.4 %
LYMPHOCYTES NFR BLD: 14.9 %
LYMPHOCYTES NFR BLD: 15.2 %
LYMPHOCYTES NFR BLD: 17.5 %
LYMPHOCYTES NFR BLD: 18.2 %
LYMPHOCYTES NFR BLD: 20.6 %
LYMPHOCYTES NFR BLD: 22.4 %
MAGNESIUM SERPL-MCNC: 1.6 MG/DL
MAGNESIUM SERPL-MCNC: 1.8 MG/DL
MAGNESIUM SERPL-MCNC: 1.9 MG/DL
MAGNESIUM SERPL-MCNC: 2 MG/DL
MAGNESIUM SERPL-MCNC: 2.1 MG/DL
MAGNESIUM SERPL-MCNC: 2.2 MG/DL
MAGNESIUM SERPL-MCNC: 2.2 MG/DL
MCH RBC QN AUTO: 27.8 PG
MCH RBC QN AUTO: 27.8 PG
MCH RBC QN AUTO: 28 PG
MCH RBC QN AUTO: 28.1 PG
MCH RBC QN AUTO: 28.1 PG
MCH RBC QN AUTO: 28.2 PG
MCH RBC QN AUTO: 28.4 PG
MCHC RBC AUTO-ENTMCNC: 29.4 G/DL
MCHC RBC AUTO-ENTMCNC: 29.8 G/DL
MCHC RBC AUTO-ENTMCNC: 30.2 G/DL
MCHC RBC AUTO-ENTMCNC: 30.2 G/DL
MCHC RBC AUTO-ENTMCNC: 30.7 G/DL
MCHC RBC AUTO-ENTMCNC: 31.1 G/DL
MCHC RBC AUTO-ENTMCNC: 31.7 G/DL
MCV RBC AUTO: 90 FL
MCV RBC AUTO: 91 FL
MCV RBC AUTO: 92 FL
MCV RBC AUTO: 92 FL
MCV RBC AUTO: 93 FL
MCV RBC AUTO: 94 FL
MCV RBC AUTO: 95 FL
METHADONE UR QL SCN>300 NG/ML: NEGATIVE
MICROSCOPIC COMMENT: ABNORMAL
MODE: ABNORMAL
MONOCYTES # BLD AUTO: 0.2 K/UL
MONOCYTES # BLD AUTO: 0.8 K/UL
MONOCYTES # BLD AUTO: 0.9 K/UL
MONOCYTES # BLD AUTO: 1.1 K/UL
MONOCYTES # BLD AUTO: 1.2 K/UL
MONOCYTES NFR BLD: 11.9 %
MONOCYTES NFR BLD: 12.2 %
MONOCYTES NFR BLD: 12.3 %
MONOCYTES NFR BLD: 12.9 %
MONOCYTES NFR BLD: 14.2 %
MONOCYTES NFR BLD: 5.5 %
MONOCYTES NFR BLD: 9.8 %
NEUTROPHILS # BLD AUTO: 2.4 K/UL
NEUTROPHILS # BLD AUTO: 4.4 K/UL
NEUTROPHILS # BLD AUTO: 5.1 K/UL
NEUTROPHILS # BLD AUTO: 5.9 K/UL
NEUTROPHILS # BLD AUTO: 6 K/UL
NEUTROPHILS # BLD AUTO: 6.6 K/UL
NEUTROPHILS # BLD AUTO: 6.9 K/UL
NEUTROPHILS NFR BLD: 59.6 %
NEUTROPHILS NFR BLD: 63 %
NEUTROPHILS NFR BLD: 68.3 %
NEUTROPHILS NFR BLD: 68.4 %
NEUTROPHILS NFR BLD: 69 %
NEUTROPHILS NFR BLD: 74.9 %
NEUTROPHILS NFR BLD: 76.7 %
NICOTINE UR-MCNC: <5 NG/ML
NITRITE UR QL STRIP: NEGATIVE
NITRITE UR QL STRIP: NEGATIVE
NORNICOTINE UR-MCNC: <2 NG/ML
NRBC BLD-RTO: 0 /100 WBC
OPIATES UR QL SCN: NEGATIVE
PCO2 BLDA: 60.9 MMHG (ref 35–45)
PCO2 BLDA: 66.6 MMHG (ref 35–45)
PCO2 BLDA: 69.3 MMHG (ref 35–45)
PCO2 BLDA: 90.4 MMHG (ref 35–45)
PCP UR QL SCN>25 NG/ML: NEGATIVE
PEEP: 8
PH SMN: 7.09 [PH] (ref 7.35–7.45)
PH SMN: 7.34 [PH] (ref 7.35–7.45)
PH SMN: 7.4 [PH] (ref 7.35–7.45)
PH SMN: 7.41 [PH] (ref 7.35–7.45)
PH UR STRIP: 6 [PH] (ref 5–8)
PH UR STRIP: 6 [PH] (ref 5–8)
PHOSPHATE SERPL-MCNC: 3.8 MG/DL
PHOSPHATE SERPL-MCNC: 4.1 MG/DL
PHOSPHATE SERPL-MCNC: 4.3 MG/DL
PHOSPHATE SERPL-MCNC: 4.5 MG/DL
PHOSPHATE SERPL-MCNC: 4.7 MG/DL
PLATELET # BLD AUTO: 128 K/UL
PLATELET # BLD AUTO: 129 K/UL
PLATELET # BLD AUTO: 145 K/UL
PLATELET # BLD AUTO: 147 K/UL
PLATELET # BLD AUTO: 151 K/UL
PLATELET # BLD AUTO: 157 K/UL
PLATELET # BLD AUTO: 158 K/UL
PMV BLD AUTO: 10.1 FL
PMV BLD AUTO: 10.3 FL
PMV BLD AUTO: 10.6 FL
PMV BLD AUTO: 10.7 FL
PMV BLD AUTO: 9.6 FL
PMV BLD AUTO: 9.7 FL
PMV BLD AUTO: 9.8 FL
PO2 BLDA: 45 MMHG (ref 80–100)
PO2 BLDA: 72 MMHG (ref 80–100)
PO2 BLDA: 81 MMHG (ref 80–100)
PO2 BLDA: 82 MMHG (ref 80–100)
POC BE: -3 MMOL/L
POC BE: 12 MMOL/L
POC BE: 13 MMOL/L
POC BE: 17 MMOL/L
POC SATURATED O2: 79 % (ref 95–100)
POC SATURATED O2: 89 % (ref 95–100)
POC SATURATED O2: 93 % (ref 95–100)
POC SATURATED O2: 95 % (ref 95–100)
POC TCO2: 30 MMOL/L (ref 23–27)
POC TCO2: 40 MMOL/L (ref 23–27)
POC TCO2: 40 MMOL/L (ref 23–27)
POC TCO2: 44 MMOL/L (ref 23–27)
POCT GLUCOSE: 100 MG/DL (ref 70–110)
POCT GLUCOSE: 104 MG/DL (ref 70–110)
POCT GLUCOSE: 112 MG/DL (ref 70–110)
POCT GLUCOSE: 116 MG/DL (ref 70–110)
POCT GLUCOSE: 119 MG/DL (ref 70–110)
POCT GLUCOSE: 124 MG/DL (ref 70–110)
POCT GLUCOSE: 130 MG/DL (ref 70–110)
POCT GLUCOSE: 134 MG/DL (ref 70–110)
POCT GLUCOSE: 135 MG/DL (ref 70–110)
POCT GLUCOSE: 138 MG/DL (ref 70–110)
POCT GLUCOSE: 139 MG/DL (ref 70–110)
POCT GLUCOSE: 141 MG/DL (ref 70–110)
POCT GLUCOSE: 143 MG/DL (ref 70–110)
POCT GLUCOSE: 147 MG/DL (ref 70–110)
POCT GLUCOSE: 150 MG/DL (ref 70–110)
POCT GLUCOSE: 207 MG/DL (ref 70–110)
POCT GLUCOSE: 67 MG/DL (ref 70–110)
POCT GLUCOSE: 76 MG/DL (ref 70–110)
POCT GLUCOSE: 85 MG/DL (ref 70–110)
POCT GLUCOSE: 93 MG/DL (ref 70–110)
POTASSIUM SERPL-SCNC: 3.6 MMOL/L
POTASSIUM SERPL-SCNC: 3.7 MMOL/L
POTASSIUM SERPL-SCNC: 3.7 MMOL/L
POTASSIUM SERPL-SCNC: 3.9 MMOL/L
POTASSIUM SERPL-SCNC: 3.9 MMOL/L
POTASSIUM SERPL-SCNC: 4 MMOL/L
POTASSIUM SERPL-SCNC: 4.2 MMOL/L
POTASSIUM SERPL-SCNC: 4.2 MMOL/L
POTASSIUM SERPL-SCNC: 4.3 MMOL/L
POTASSIUM SERPL-SCNC: 4.3 MMOL/L
POTASSIUM SERPL-SCNC: 4.4 MMOL/L
POTASSIUM SERPL-SCNC: 4.5 MMOL/L
POTASSIUM SERPL-SCNC: 4.7 MMOL/L
POTASSIUM SERPL-SCNC: 4.8 MMOL/L
POTASSIUM SERPL-SCNC: 5.5 MMOL/L
PRE FEV1 FVC: 84
PRE FEV1 FVC: 87
PRE FEV1 FVC: 90
PRE FEV1: 0.88
PRE FEV1: 1.16
PRE FEV1: 1.2
PRE FVC: 1.05
PRE FVC: 1.29
PRE FVC: 1.38
PREDICTED FEV1 FVC: 80
PREDICTED FEV1 FVC: 80
PREDICTED FEV1 FVC: 81
PREDICTED FEV1: 3.52
PREDICTED FEV1: 3.66
PREDICTED FEV1: 3.68
PREDICTED FVC: 4.33
PREDICTED FVC: 4.51
PREDICTED FVC: 4.54
PROCALCITONIN SERPL IA-MCNC: 0.04 NG/ML
PROCALCITONIN SERPL IA-MCNC: 0.04 NG/ML
PROT SERPL-MCNC: 7.7 G/DL
PROT SERPL-MCNC: 7.8 G/DL
PROT SERPL-MCNC: 7.8 G/DL
PROT SERPL-MCNC: 8.1 G/DL
PROT SERPL-MCNC: 8.2 G/DL
PROT SERPL-MCNC: 8.5 G/DL
PROT UR QL STRIP: ABNORMAL
PROT UR QL STRIP: NEGATIVE
RBC # BLD AUTO: 4.84 M/UL
RBC # BLD AUTO: 5.35 M/UL
RBC # BLD AUTO: 5.36 M/UL
RBC # BLD AUTO: 5.45 M/UL
RBC # BLD AUTO: 5.51 M/UL
RBC # BLD AUTO: 5.52 M/UL
RBC # BLD AUTO: 5.62 M/UL
RBC #/AREA URNS HPF: 1 /HPF (ref 0–4)
RETIRED EF AND QEF - SEE NOTES: 65 (ref 55–65)
S PNEUM DA 18C IGG SER-MCNC: 29.4 MCG/ML
S PNEUM DA 6B IGG SER-MCNC: 0.3 MCG/ML
S PNEUM DA 7F IGG SER-MCNC: 0.6 MCG/ML
S PNEUM DA 9V IGG SER-MCNC: 5.5 MCG/ML
SAMPLE: ABNORMAL
SITE: ABNORMAL
SODIUM SERPL-SCNC: 132 MMOL/L
SODIUM SERPL-SCNC: 132 MMOL/L
SODIUM SERPL-SCNC: 133 MMOL/L
SODIUM SERPL-SCNC: 134 MMOL/L
SODIUM SERPL-SCNC: 134 MMOL/L
SODIUM SERPL-SCNC: 136 MMOL/L
SODIUM SERPL-SCNC: 137 MMOL/L
SODIUM SERPL-SCNC: 138 MMOL/L
SODIUM SERPL-SCNC: 138 MMOL/L
SODIUM SERPL-SCNC: 139 MMOL/L
SODIUM SERPL-SCNC: 140 MMOL/L
SP GR UR STRIP: 1.01 (ref 1–1.03)
SP GR UR STRIP: 1.02 (ref 1–1.03)
SP02: 93
SQUAMOUS #/AREA URNS HPF: 0 /HPF
TOXICOLOGY INFORMATION: NORMAL
TRICUSPID VALVE REGURGITATION: ABNORMAL
TROPONIN I SERPL DL<=0.01 NG/ML-MCNC: 0.02 NG/ML
TROPONIN I SERPL DL<=0.01 NG/ML-MCNC: 0.02 NG/ML
TROPONIN I SERPL DL<=0.01 NG/ML-MCNC: 0.03 NG/ML
TROPONIN I SERPL DL<=0.01 NG/ML-MCNC: 0.03 NG/ML
TSH SERPL DL<=0.005 MIU/L-ACNC: 2.97 UIU/ML
URN SPEC COLLECT METH UR: ABNORMAL
URN SPEC COLLECT METH UR: NORMAL
UROBILINOGEN UR STRIP-ACNC: 1 EU/DL
UROBILINOGEN UR STRIP-ACNC: 2 EU/DL
VT: 400
WBC # BLD AUTO: 10 K/UL
WBC # BLD AUTO: 3.08 K/UL
WBC # BLD AUTO: 6.37 K/UL
WBC # BLD AUTO: 8.6 K/UL
WBC # BLD AUTO: 8.67 K/UL
WBC # BLD AUTO: 8.87 K/UL
WBC # BLD AUTO: 9.56 K/UL
WBC #/AREA URNS HPF: 1 /HPF (ref 0–5)

## 2018-01-01 PROCEDURE — 84145 PROCALCITONIN (PCT): CPT | Mod: NTX

## 2018-01-01 PROCEDURE — 90715 TDAP VACCINE 7 YRS/> IM: CPT | Mod: NTX | Performed by: PHYSICIAN ASSISTANT

## 2018-01-01 PROCEDURE — 25000242 PHARM REV CODE 250 ALT 637 W/ HCPCS: Mod: NTX | Performed by: HOSPITALIST

## 2018-01-01 PROCEDURE — 96375 TX/PRO/DX INJ NEW DRUG ADDON: CPT

## 2018-01-01 PROCEDURE — 94729 DIFFUSING CAPACITY: CPT | Mod: S$GLB,,, | Performed by: INTERNAL MEDICINE

## 2018-01-01 PROCEDURE — 84100 ASSAY OF PHOSPHORUS: CPT | Mod: NTX

## 2018-01-01 PROCEDURE — 93010 ELECTROCARDIOGRAM REPORT: CPT | Mod: ,,, | Performed by: INTERNAL MEDICINE

## 2018-01-01 PROCEDURE — 99215 OFFICE O/P EST HI 40 MIN: CPT | Mod: S$GLB,,, | Performed by: INTERNAL MEDICINE

## 2018-01-01 PROCEDURE — 36415 COLL VENOUS BLD VENIPUNCTURE: CPT

## 2018-01-01 PROCEDURE — 99900035 HC TECH TIME PER 15 MIN (STAT): Mod: NTX

## 2018-01-01 PROCEDURE — 80323 ALKALOIDS NOS: CPT | Mod: TXP

## 2018-01-01 PROCEDURE — 99999 PR PBB SHADOW E&M-EST. PATIENT-LVL III: CPT | Mod: PBBFAC,,, | Performed by: INTERNAL MEDICINE

## 2018-01-01 PROCEDURE — 83735 ASSAY OF MAGNESIUM: CPT | Mod: NTX

## 2018-01-01 PROCEDURE — 80048 BASIC METABOLIC PNL TOTAL CA: CPT | Mod: NTX

## 2018-01-01 PROCEDURE — 99285 EMERGENCY DEPT VISIT HI MDM: CPT | Mod: 25,NTX

## 2018-01-01 PROCEDURE — 99214 OFFICE O/P EST MOD 30 MIN: CPT | Mod: S$GLB,,, | Performed by: ALLERGY & IMMUNOLOGY

## 2018-01-01 PROCEDURE — 85025 COMPLETE CBC W/AUTO DIFF WBC: CPT | Mod: NTX

## 2018-01-01 PROCEDURE — 85379 FIBRIN DEGRADATION QUANT: CPT | Mod: NTX

## 2018-01-01 PROCEDURE — 99223 1ST HOSP IP/OBS HIGH 75: CPT | Mod: NTX,,, | Performed by: HOSPITALIST

## 2018-01-01 PROCEDURE — 93306 TTE W/DOPPLER COMPLETE: CPT | Mod: 26,,, | Performed by: INTERNAL MEDICINE

## 2018-01-01 PROCEDURE — 93005 ELECTROCARDIOGRAM TRACING: CPT | Mod: NTX

## 2018-01-01 PROCEDURE — 25000003 PHARM REV CODE 250: Mod: NTX | Performed by: HOSPITALIST

## 2018-01-01 PROCEDURE — 27100171 HC OXYGEN HIGH FLOW UP TO 24 HOURS: Mod: NTX

## 2018-01-01 PROCEDURE — 11000001 HC ACUTE MED/SURG PRIVATE ROOM: Mod: NTX

## 2018-01-01 PROCEDURE — 97530 THERAPEUTIC ACTIVITIES: CPT | Mod: NTX

## 2018-01-01 PROCEDURE — 71046 X-RAY EXAM CHEST 2 VIEWS: CPT | Mod: TC,FY

## 2018-01-01 PROCEDURE — 94640 AIRWAY INHALATION TREATMENT: CPT | Mod: NTX

## 2018-01-01 PROCEDURE — 94761 N-INVAS EAR/PLS OXIMETRY MLT: CPT | Mod: NTX

## 2018-01-01 PROCEDURE — 71046 X-RAY EXAM CHEST 2 VIEWS: CPT | Mod: 26,,, | Performed by: INTERNAL MEDICINE

## 2018-01-01 PROCEDURE — 81000 URINALYSIS NONAUTO W/SCOPE: CPT | Mod: NTX

## 2018-01-01 PROCEDURE — 63600175 PHARM REV CODE 636 W HCPCS: Mod: NTX | Performed by: STUDENT IN AN ORGANIZED HEALTH CARE EDUCATION/TRAINING PROGRAM

## 2018-01-01 PROCEDURE — 96374 THER/PROPH/DIAG INJ IV PUSH: CPT | Mod: NTX

## 2018-01-01 PROCEDURE — 97162 PT EVAL MOD COMPLEX 30 MIN: CPT | Mod: NTX

## 2018-01-01 PROCEDURE — 94010 BREATHING CAPACITY TEST: CPT | Mod: S$GLB,,, | Performed by: INTERNAL MEDICINE

## 2018-01-01 PROCEDURE — 83690 ASSAY OF LIPASE: CPT

## 2018-01-01 PROCEDURE — 25000003 PHARM REV CODE 250: Mod: NTX | Performed by: PEDIATRICS

## 2018-01-01 PROCEDURE — 99232 SBSQ HOSP IP/OBS MODERATE 35: CPT | Mod: NTX,,, | Performed by: EMERGENCY MEDICINE

## 2018-01-01 PROCEDURE — 82803 BLOOD GASES ANY COMBINATION: CPT | Mod: NTX

## 2018-01-01 PROCEDURE — 27100092 HC HIGH FLOW DELIVERY CANNULA: Mod: NTX

## 2018-01-01 PROCEDURE — 83605 ASSAY OF LACTIC ACID: CPT

## 2018-01-01 PROCEDURE — 99285 EMERGENCY DEPT VISIT HI MDM: CPT | Mod: NTX,,, | Performed by: EMERGENCY MEDICINE

## 2018-01-01 PROCEDURE — 3008F BODY MASS INDEX DOCD: CPT | Mod: CPTII,S$GLB,, | Performed by: INTERNAL MEDICINE

## 2018-01-01 PROCEDURE — 63600175 PHARM REV CODE 636 W HCPCS: Mod: NTX | Performed by: PEDIATRICS

## 2018-01-01 PROCEDURE — 94664 DEMO&/EVAL PT USE INHALER: CPT | Mod: NTX

## 2018-01-01 PROCEDURE — 99223 1ST HOSP IP/OBS HIGH 75: CPT | Mod: NTX,,, | Performed by: EMERGENCY MEDICINE

## 2018-01-01 PROCEDURE — 83735 ASSAY OF MAGNESIUM: CPT | Mod: 91,NTX

## 2018-01-01 PROCEDURE — 97161 PT EVAL LOW COMPLEX 20 MIN: CPT | Mod: NTX

## 2018-01-01 PROCEDURE — 90471 IMMUNIZATION ADMIN: CPT | Mod: NTX | Performed by: PHYSICIAN ASSISTANT

## 2018-01-01 PROCEDURE — G8979 MOBILITY GOAL STATUS: HCPCS | Mod: CJ,NTX

## 2018-01-01 PROCEDURE — 96365 THER/PROPH/DIAG IV INF INIT: CPT

## 2018-01-01 PROCEDURE — 36415 COLL VENOUS BLD VENIPUNCTURE: CPT | Mod: NTX

## 2018-01-01 PROCEDURE — 80076 HEPATIC FUNCTION PANEL: CPT

## 2018-01-01 PROCEDURE — 80307 DRUG TEST PRSMV CHEM ANLYZR: CPT | Mod: TXP

## 2018-01-01 PROCEDURE — 27000221 HC OXYGEN, UP TO 24 HOURS: Mod: NTX

## 2018-01-01 PROCEDURE — 80048 BASIC METABOLIC PNL TOTAL CA: CPT | Mod: 91,NTX

## 2018-01-01 PROCEDURE — 25500020 PHARM REV CODE 255: Mod: TXP | Performed by: INTERNAL MEDICINE

## 2018-01-01 PROCEDURE — 71275 CT ANGIOGRAPHY CHEST: CPT | Mod: 26,NTX,, | Performed by: RADIOLOGY

## 2018-01-01 PROCEDURE — 99999 PR PBB SHADOW E&M-EST. PATIENT-LVL II: CPT | Mod: PBBFAC,,, | Performed by: ALLERGY & IMMUNOLOGY

## 2018-01-01 PROCEDURE — 25000242 PHARM REV CODE 250 ALT 637 W/ HCPCS: Mod: NTX | Performed by: EMERGENCY MEDICINE

## 2018-01-01 PROCEDURE — 63600175 PHARM REV CODE 636 W HCPCS: Mod: NTX

## 2018-01-01 PROCEDURE — 93005 ELECTROCARDIOGRAM TRACING: CPT

## 2018-01-01 PROCEDURE — 80048 BASIC METABOLIC PNL TOTAL CA: CPT

## 2018-01-01 PROCEDURE — 87449 NOS EACH ORGANISM AG IA: CPT | Mod: NTX

## 2018-01-01 PROCEDURE — 27000190 HC CPAP FULL FACE MASK W/VALVE: Mod: NTX

## 2018-01-01 PROCEDURE — 93010 EKG 12-LEAD: ICD-10-PCS | Mod: ,,, | Performed by: INTERNAL MEDICINE

## 2018-01-01 PROCEDURE — 80053 COMPREHEN METABOLIC PANEL: CPT | Mod: NTX

## 2018-01-01 PROCEDURE — 93010 ELECTROCARDIOGRAM REPORT: CPT | Mod: NTX,,, | Performed by: INTERNAL MEDICINE

## 2018-01-01 PROCEDURE — 99999 PR PBB SHADOW E&M-EST. PATIENT-LVL III: CPT | Mod: PBBFAC,TXP,, | Performed by: INTERNAL MEDICINE

## 2018-01-01 PROCEDURE — 36600 WITHDRAWAL OF ARTERIAL BLOOD: CPT | Mod: NTX

## 2018-01-01 PROCEDURE — 63600175 PHARM REV CODE 636 W HCPCS: Mod: NTX | Performed by: HOSPITALIST

## 2018-01-01 PROCEDURE — 83036 HEMOGLOBIN GLYCOSYLATED A1C: CPT | Mod: NTX

## 2018-01-01 PROCEDURE — 96366 THER/PROPH/DIAG IV INF ADDON: CPT

## 2018-01-01 PROCEDURE — 87070 CULTURE OTHR SPECIMN AEROBIC: CPT

## 2018-01-01 PROCEDURE — 27000646 HC AEROBIKA DEVICE: Mod: NTX

## 2018-01-01 PROCEDURE — 99233 SBSQ HOSP IP/OBS HIGH 50: CPT | Mod: NTX,,, | Performed by: HOSPITALIST

## 2018-01-01 PROCEDURE — 87040 BLOOD CULTURE FOR BACTERIA: CPT | Mod: NTX

## 2018-01-01 PROCEDURE — 3008F BODY MASS INDEX DOCD: CPT | Mod: CPTII,S$GLB,, | Performed by: ALLERGY & IMMUNOLOGY

## 2018-01-01 PROCEDURE — 97116 GAIT TRAINING THERAPY: CPT | Mod: NTX

## 2018-01-01 PROCEDURE — 90686 IIV4 VACC NO PRSV 0.5 ML IM: CPT | Mod: NTX | Performed by: HOSPITALIST

## 2018-01-01 PROCEDURE — 84484 ASSAY OF TROPONIN QUANT: CPT | Mod: NTX

## 2018-01-01 PROCEDURE — 71250 CT THORAX DX C-: CPT | Mod: 26,,, | Performed by: RADIOLOGY

## 2018-01-01 PROCEDURE — 25000003 PHARM REV CODE 250: Mod: NTX | Performed by: EMERGENCY MEDICINE

## 2018-01-01 PROCEDURE — 99999 PR PBB SHADOW E&M-EST. PATIENT-LVL III: CPT | Mod: PBBFAC,,, | Performed by: ALLERGY & IMMUNOLOGY

## 2018-01-01 PROCEDURE — 99204 OFFICE O/P NEW MOD 45 MIN: CPT | Mod: 25,S$GLB,, | Performed by: INTERNAL MEDICINE

## 2018-01-01 PROCEDURE — 86317 IMMUNOASSAY INFECTIOUS AGENT: CPT

## 2018-01-01 PROCEDURE — 25000003 PHARM REV CODE 250: Performed by: ALLERGY & IMMUNOLOGY

## 2018-01-01 PROCEDURE — 63600175 PHARM REV CODE 636 W HCPCS: Mod: NTX | Performed by: PHYSICIAN ASSISTANT

## 2018-01-01 PROCEDURE — 99999 PR PBB SHADOW E&M-EST. PATIENT-LVL IV: CPT | Mod: PBBFAC,,, | Performed by: INTERNAL MEDICINE

## 2018-01-01 PROCEDURE — 84443 ASSAY THYROID STIM HORMONE: CPT | Mod: NTX

## 2018-01-01 PROCEDURE — 25000003 PHARM REV CODE 250: Mod: NTX | Performed by: STUDENT IN AN ORGANIZED HEALTH CARE EDUCATION/TRAINING PROGRAM

## 2018-01-01 PROCEDURE — 83735 ASSAY OF MAGNESIUM: CPT

## 2018-01-01 PROCEDURE — 94660 CPAP INITIATION&MGMT: CPT | Mod: NTX

## 2018-01-01 PROCEDURE — 96374 THER/PROPH/DIAG INJ IV PUSH: CPT

## 2018-01-01 PROCEDURE — 0BH17EZ INSERTION OF ENDOTRACHEAL AIRWAY INTO TRACHEA, VIA NATURAL OR ARTIFICIAL OPENING: ICD-10-PCS | Performed by: HOSPITALIST

## 2018-01-01 PROCEDURE — 99285 EMERGENCY DEPT VISIT HI MDM: CPT | Mod: 25

## 2018-01-01 PROCEDURE — 63600175 PHARM REV CODE 636 W HCPCS: Mod: NTX | Performed by: EMERGENCY MEDICINE

## 2018-01-01 PROCEDURE — 99214 OFFICE O/P EST MOD 30 MIN: CPT | Mod: S$GLB,,, | Performed by: INTERNAL MEDICINE

## 2018-01-01 PROCEDURE — 97165 OT EVAL LOW COMPLEX 30 MIN: CPT | Mod: NTX

## 2018-01-01 PROCEDURE — 93306 TTE W/DOPPLER COMPLETE: CPT

## 2018-01-01 PROCEDURE — 87205 SMEAR GRAM STAIN: CPT

## 2018-01-01 PROCEDURE — 80053 COMPREHEN METABOLIC PANEL: CPT

## 2018-01-01 PROCEDURE — 94667 MNPJ CHEST WALL 1ST: CPT | Mod: NTX

## 2018-01-01 PROCEDURE — 3E02340 INTRODUCTION OF INFLUENZA VACCINE INTO MUSCLE, PERCUTANEOUS APPROACH: ICD-10-PCS | Performed by: HOSPITALIST

## 2018-01-01 PROCEDURE — 71250 CT THORAX DX C-: CPT | Mod: TC

## 2018-01-01 PROCEDURE — 96375 TX/PRO/DX INJ NEW DRUG ADDON: CPT | Mod: NTX

## 2018-01-01 PROCEDURE — 99214 OFFICE O/P EST MOD 30 MIN: CPT | Mod: 25,S$GLB,, | Performed by: INTERNAL MEDICINE

## 2018-01-01 PROCEDURE — 90471 IMMUNIZATION ADMIN: CPT | Mod: NTX | Performed by: HOSPITALIST

## 2018-01-01 PROCEDURE — 25000003 PHARM REV CODE 250: Mod: NTX

## 2018-01-01 PROCEDURE — 25000242 PHARM REV CODE 250 ALT 637 W/ HCPCS: Mod: NTX | Performed by: STUDENT IN AN ORGANIZED HEALTH CARE EDUCATION/TRAINING PROGRAM

## 2018-01-01 PROCEDURE — 92950 HEART/LUNG RESUSCITATION CPR: CPT | Mod: NTX

## 2018-01-01 PROCEDURE — G0378 HOSPITAL OBSERVATION PER HR: HCPCS | Mod: NTX

## 2018-01-01 PROCEDURE — 63600175 PHARM REV CODE 636 W HCPCS: Performed by: ALLERGY & IMMUNOLOGY

## 2018-01-01 PROCEDURE — G8978 MOBILITY CURRENT STATUS: HCPCS | Mod: CK,NTX

## 2018-01-01 PROCEDURE — 93306 TTE W/DOPPLER COMPLETE: CPT | Mod: S$GLB,ICN,, | Performed by: INTERNAL MEDICINE

## 2018-01-01 PROCEDURE — 83880 ASSAY OF NATRIURETIC PEPTIDE: CPT | Mod: NTX

## 2018-01-01 PROCEDURE — 31500 INSERT EMERGENCY AIRWAY: CPT | Performed by: ANESTHESIOLOGY

## 2018-01-01 PROCEDURE — 94618 PULMONARY STRESS TESTING: CPT | Mod: S$GLB,,, | Performed by: INTERNAL MEDICINE

## 2018-01-01 PROCEDURE — 3008F BODY MASS INDEX DOCD: CPT | Mod: S$GLB,,, | Performed by: ALLERGY & IMMUNOLOGY

## 2018-01-01 PROCEDURE — 71275 CT ANGIOGRAPHY CHEST: CPT | Mod: TC,NTX

## 2018-01-01 PROCEDURE — 94002 VENT MGMT INPAT INIT DAY: CPT | Mod: NTX

## 2018-01-01 PROCEDURE — 87040 BLOOD CULTURE FOR BACTERIA: CPT | Mod: 59,NTX

## 2018-01-01 PROCEDURE — 63600175 PHARM REV CODE 636 W HCPCS: Mod: NTX | Performed by: INTERNAL MEDICINE

## 2018-01-01 PROCEDURE — 81003 URINALYSIS AUTO W/O SCOPE: CPT | Mod: NTX

## 2018-01-01 PROCEDURE — S0028 INJECTION, FAMOTIDINE, 20 MG: HCPCS | Performed by: ALLERGY & IMMUNOLOGY

## 2018-01-01 RX ORDER — FUROSEMIDE 10 MG/ML
40 INJECTION INTRAMUSCULAR; INTRAVENOUS ONCE
Status: COMPLETED | OUTPATIENT
Start: 2018-01-01 | End: 2018-01-01

## 2018-01-01 RX ORDER — ACETAMINOPHEN 325 MG/1
650 TABLET ORAL
Status: CANCELLED | OUTPATIENT
Start: 2018-01-01

## 2018-01-01 RX ORDER — IPRATROPIUM BROMIDE AND ALBUTEROL SULFATE 2.5; .5 MG/3ML; MG/3ML
3 SOLUTION RESPIRATORY (INHALATION) EVERY 6 HOURS
Status: DISCONTINUED | OUTPATIENT
Start: 2018-01-01 | End: 2018-01-01 | Stop reason: HOSPADM

## 2018-01-01 RX ORDER — FAMOTIDINE 10 MG/ML
20 INJECTION INTRAVENOUS
Status: COMPLETED | OUTPATIENT
Start: 2018-01-01 | End: 2018-01-01

## 2018-01-01 RX ORDER — GLUCAGON 1 MG
1 KIT INJECTION
Status: DISCONTINUED | OUTPATIENT
Start: 2018-01-01 | End: 2018-01-01 | Stop reason: HOSPADM

## 2018-01-01 RX ORDER — FUROSEMIDE 10 MG/ML
40 INJECTION INTRAMUSCULAR; INTRAVENOUS 3 TIMES DAILY
Status: DISCONTINUED | OUTPATIENT
Start: 2018-01-01 | End: 2018-01-01

## 2018-01-01 RX ORDER — POTASSIUM CHLORIDE 750 MG/1
10 TABLET, EXTENDED RELEASE ORAL DAILY
Qty: 30 TABLET | Refills: 3 | Status: SHIPPED | OUTPATIENT
Start: 2018-01-01 | End: 2018-01-01

## 2018-01-01 RX ORDER — DIPHENHYDRAMINE HCL 25 MG
CAPSULE ORAL
COMMUNITY

## 2018-01-01 RX ORDER — EPINEPHRINE 0.1 MG/ML
INJECTION INTRAVENOUS CODE/TRAUMA/SEDATION MEDICATION
Status: COMPLETED | OUTPATIENT
Start: 2018-01-01 | End: 2018-01-01

## 2018-01-01 RX ORDER — CALCIUM CARBONATE 200(500)MG
500 TABLET,CHEWABLE ORAL 3 TIMES DAILY PRN
Status: DISCONTINUED | OUTPATIENT
Start: 2018-01-01 | End: 2018-01-01 | Stop reason: HOSPADM

## 2018-01-01 RX ORDER — FENTANYL CITRATE 50 UG/ML
50 INJECTION, SOLUTION INTRAMUSCULAR; INTRAVENOUS
Status: DISCONTINUED | OUTPATIENT
Start: 2018-01-01 | End: 2018-01-01 | Stop reason: HOSPADM

## 2018-01-01 RX ORDER — ACETAZOLAMIDE 250 MG/1
500 TABLET ORAL DAILY
Status: DISCONTINUED | OUTPATIENT
Start: 2018-01-01 | End: 2018-01-01

## 2018-01-01 RX ORDER — ENOXAPARIN SODIUM 100 MG/ML
40 INJECTION SUBCUTANEOUS EVERY 24 HOURS
Status: DISCONTINUED | OUTPATIENT
Start: 2018-01-01 | End: 2018-01-01 | Stop reason: HOSPADM

## 2018-01-01 RX ORDER — FENTANYL CITRATE-0.9 % NACL/PF 10 MCG/ML
25 PLASTIC BAG, INJECTION (ML) INTRAVENOUS CONTINUOUS
Status: DISCONTINUED | OUTPATIENT
Start: 2018-01-01 | End: 2018-01-01 | Stop reason: HOSPADM

## 2018-01-01 RX ORDER — GUAIFENESIN 600 MG/1
600 TABLET, EXTENDED RELEASE ORAL 2 TIMES DAILY
Status: DISCONTINUED | OUTPATIENT
Start: 2018-01-01 | End: 2018-01-01 | Stop reason: HOSPADM

## 2018-01-01 RX ORDER — FUROSEMIDE 10 MG/ML
40 INJECTION INTRAMUSCULAR; INTRAVENOUS 2 TIMES DAILY
Status: DISCONTINUED | OUTPATIENT
Start: 2018-01-01 | End: 2018-01-01

## 2018-01-01 RX ORDER — ALBUTEROL SULFATE 0.83 MG/ML
2.5 SOLUTION RESPIRATORY (INHALATION) EVERY 6 HOURS PRN
Qty: 180 ML | Refills: 6 | Status: SHIPPED | OUTPATIENT
Start: 2018-01-01 | End: 2018-01-01

## 2018-01-01 RX ORDER — IPRATROPIUM BROMIDE AND ALBUTEROL SULFATE 2.5; .5 MG/3ML; MG/3ML
3 SOLUTION RESPIRATORY (INHALATION) EVERY 6 HOURS
Status: DISPENSED | OUTPATIENT
Start: 2018-01-01 | End: 2018-01-01

## 2018-01-01 RX ORDER — HEPARIN 100 UNIT/ML
500 SYRINGE INTRAVENOUS
Status: CANCELLED | OUTPATIENT
Start: 2018-01-01

## 2018-01-01 RX ORDER — ACETAMINOPHEN 325 MG/1
650 TABLET ORAL
Status: COMPLETED | OUTPATIENT
Start: 2018-01-01 | End: 2018-01-01

## 2018-01-01 RX ORDER — FENTANYL CITRATE 50 UG/ML
25 INJECTION, SOLUTION INTRAMUSCULAR; INTRAVENOUS ONCE
Status: COMPLETED | OUTPATIENT
Start: 2018-01-01 | End: 2018-01-01

## 2018-01-01 RX ORDER — FLUTICASONE PROPIONATE 50 MCG
2 SPRAY, SUSPENSION (ML) NASAL 2 TIMES DAILY
Status: DISCONTINUED | OUTPATIENT
Start: 2018-01-01 | End: 2018-01-01 | Stop reason: HOSPADM

## 2018-01-01 RX ORDER — VANCOMYCIN 1.75 GRAM/500 ML IN 0.9 % SODIUM CHLORIDE INTRAVENOUS
15
Status: DISCONTINUED | OUTPATIENT
Start: 2018-01-01 | End: 2018-01-01

## 2018-01-01 RX ORDER — METHYLPREDNISOLONE SOD SUCC 125 MG
80 VIAL (EA) INJECTION ONCE
Status: COMPLETED | OUTPATIENT
Start: 2018-01-01 | End: 2018-01-01

## 2018-01-01 RX ORDER — POTASSIUM CHLORIDE 750 MG/1
10 TABLET, EXTENDED RELEASE ORAL 2 TIMES DAILY
Qty: 60 TABLET | Refills: 3 | Status: SHIPPED | OUTPATIENT
Start: 2018-01-01

## 2018-01-01 RX ORDER — DOXYCYCLINE 100 MG/1
100 CAPSULE ORAL 2 TIMES DAILY
Qty: 20 CAPSULE | Refills: 0 | Status: SHIPPED | OUTPATIENT
Start: 2018-01-01 | End: 2018-01-01

## 2018-01-01 RX ORDER — IBUPROFEN 200 MG
16 TABLET ORAL
Status: DISCONTINUED | OUTPATIENT
Start: 2018-01-01 | End: 2018-01-01 | Stop reason: HOSPADM

## 2018-01-01 RX ORDER — SODIUM CHLORIDE 0.9 % (FLUSH) 0.9 %
10 SYRINGE (ML) INJECTION
Status: CANCELLED | OUTPATIENT
Start: 2018-01-01

## 2018-01-01 RX ORDER — IBUPROFEN 400 MG/1
400 TABLET ORAL EVERY 8 HOURS PRN
Status: DISCONTINUED | OUTPATIENT
Start: 2018-01-01 | End: 2018-01-01

## 2018-01-01 RX ORDER — PANTOPRAZOLE SODIUM 40 MG/1
40 TABLET, DELAYED RELEASE ORAL DAILY
Status: DISCONTINUED | OUTPATIENT
Start: 2018-01-01 | End: 2018-01-01

## 2018-01-01 RX ORDER — FAMOTIDINE 10 MG/ML
20 INJECTION INTRAVENOUS
Status: CANCELLED | OUTPATIENT
Start: 2018-01-01

## 2018-01-01 RX ORDER — ONDANSETRON 8 MG/1
8 TABLET, ORALLY DISINTEGRATING ORAL EVERY 8 HOURS PRN
Status: DISCONTINUED | OUTPATIENT
Start: 2018-01-01 | End: 2018-01-01 | Stop reason: HOSPADM

## 2018-01-01 RX ORDER — INSULIN ASPART 100 [IU]/ML
0-5 INJECTION, SOLUTION INTRAVENOUS; SUBCUTANEOUS
Status: DISCONTINUED | OUTPATIENT
Start: 2018-01-01 | End: 2018-01-01 | Stop reason: HOSPADM

## 2018-01-01 RX ORDER — IBUPROFEN 200 MG
24 TABLET ORAL
Status: DISCONTINUED | OUTPATIENT
Start: 2018-01-01 | End: 2018-01-01 | Stop reason: HOSPADM

## 2018-01-01 RX ORDER — MOXIFLOXACIN HYDROCHLORIDE 400 MG/1
400 TABLET ORAL DAILY
Status: DISCONTINUED | OUTPATIENT
Start: 2018-01-01 | End: 2018-01-01 | Stop reason: HOSPADM

## 2018-01-01 RX ORDER — CEFAZOLIN SODIUM 1 G/3ML
2 INJECTION, POWDER, FOR SOLUTION INTRAMUSCULAR; INTRAVENOUS
Status: DISCONTINUED | OUTPATIENT
Start: 2018-01-01 | End: 2018-01-01

## 2018-01-01 RX ORDER — ALBUTEROL SULFATE 2.5 MG/.5ML
2.5 SOLUTION RESPIRATORY (INHALATION) EVERY 4 HOURS PRN
Status: DISCONTINUED | OUTPATIENT
Start: 2018-01-01 | End: 2018-01-01

## 2018-01-01 RX ORDER — FUROSEMIDE 80 MG/1
80 TABLET ORAL 2 TIMES DAILY
Status: DISCONTINUED | OUTPATIENT
Start: 2018-01-01 | End: 2018-01-01 | Stop reason: HOSPADM

## 2018-01-01 RX ORDER — MYCOPHENOLATE MOFETIL 500 MG/1
500 TABLET ORAL 2 TIMES DAILY
COMMUNITY
End: 2018-01-01

## 2018-01-01 RX ORDER — PREDNISONE 20 MG/1
40 TABLET ORAL DAILY
Status: DISCONTINUED | OUTPATIENT
Start: 2018-01-01 | End: 2018-01-01

## 2018-01-01 RX ORDER — ACETAMINOPHEN 325 MG/1
650 TABLET ORAL EVERY 4 HOURS PRN
Status: DISCONTINUED | OUTPATIENT
Start: 2018-01-01 | End: 2018-01-01 | Stop reason: HOSPADM

## 2018-01-01 RX ORDER — MYCOPHENOLATE MOFETIL 500 MG/1
TABLET ORAL
Qty: 186 TABLET | Refills: 6 | Status: SHIPPED | OUTPATIENT
Start: 2018-01-01 | End: 2018-01-01 | Stop reason: SDUPTHER

## 2018-01-01 RX ORDER — MYCOPHENOLATE MOFETIL 500 MG/1
TABLET ORAL
Qty: 120 TABLET | Refills: 6 | Status: CANCELLED | OUTPATIENT
Start: 2018-01-01

## 2018-01-01 RX ORDER — ONDANSETRON 2 MG/ML
4 INJECTION INTRAMUSCULAR; INTRAVENOUS EVERY 8 HOURS PRN
Status: DISCONTINUED | OUTPATIENT
Start: 2018-01-01 | End: 2018-01-01 | Stop reason: HOSPADM

## 2018-01-01 RX ORDER — FUROSEMIDE 10 MG/ML
40 INJECTION INTRAMUSCULAR; INTRAVENOUS
Status: DISCONTINUED | OUTPATIENT
Start: 2018-01-01 | End: 2018-01-01

## 2018-01-01 RX ORDER — GUAIFENESIN 400 MG/1
400 TABLET ORAL 2 TIMES DAILY
COMMUNITY

## 2018-01-01 RX ORDER — SODIUM CHLORIDE 0.9 % (FLUSH) 0.9 %
5 SYRINGE (ML) INJECTION
Status: DISCONTINUED | OUTPATIENT
Start: 2018-01-01 | End: 2018-01-01 | Stop reason: HOSPADM

## 2018-01-01 RX ORDER — SODIUM CHLORIDE 0.9 % (FLUSH) 0.9 %
10 SYRINGE (ML) INJECTION
Status: DISCONTINUED | OUTPATIENT
Start: 2018-01-01 | End: 2018-01-01 | Stop reason: HOSPADM

## 2018-01-01 RX ORDER — NOREPINEPHRINE BITARTRATE/D5W 16MG/250ML
3 PLASTIC BAG, INJECTION (ML) INTRAVENOUS CONTINUOUS
Status: DISCONTINUED | OUTPATIENT
Start: 2018-01-01 | End: 2018-01-01 | Stop reason: HOSPADM

## 2018-01-01 RX ORDER — NOREPINEPHRINE BITARTRATE/D5W 16MG/250ML
1 PLASTIC BAG, INJECTION (ML) INTRAVENOUS CONTINUOUS
Status: DISCONTINUED | OUTPATIENT
Start: 2018-01-01 | End: 2018-01-01

## 2018-01-01 RX ORDER — MYCOPHENOLATE MOFETIL 500 MG/1
TABLET ORAL
Qty: 186 TABLET | Refills: 6 | Status: SHIPPED | OUTPATIENT
Start: 2018-01-01 | End: 2018-01-01 | Stop reason: DRUGHIGH

## 2018-01-01 RX ORDER — DOXYCYCLINE HYCLATE 100 MG
100 TABLET ORAL EVERY 12 HOURS
Qty: 20 TABLET | Refills: 1 | Status: SHIPPED | OUTPATIENT
Start: 2018-01-01 | End: 2018-01-01

## 2018-01-01 RX ORDER — ENOXAPARIN SODIUM 100 MG/ML
40 INJECTION SUBCUTANEOUS EVERY 24 HOURS
Status: DISCONTINUED | OUTPATIENT
Start: 2018-01-01 | End: 2018-01-01

## 2018-01-01 RX ORDER — ACETAMINOPHEN 325 MG/1
650 TABLET ORAL EVERY 6 HOURS PRN
COMMUNITY

## 2018-01-01 RX ORDER — ASPIRIN 325 MG
325 TABLET ORAL
Status: DISCONTINUED | OUTPATIENT
Start: 2018-01-01 | End: 2018-01-01

## 2018-01-01 RX ORDER — PROPOFOL 10 MG/ML
5 INJECTION, EMULSION INTRAVENOUS CONTINUOUS
Status: DISCONTINUED | OUTPATIENT
Start: 2018-01-01 | End: 2018-01-01 | Stop reason: HOSPADM

## 2018-01-01 RX ORDER — AMOXICILLIN AND CLAVULANATE POTASSIUM 875; 125 MG/1; MG/1
1 TABLET, FILM COATED ORAL 2 TIMES DAILY
Qty: 20 TABLET | Refills: 1 | Status: SHIPPED | OUTPATIENT
Start: 2018-01-01 | End: 2018-01-01

## 2018-01-01 RX ORDER — SIMETHICONE 125 MG
125 TABLET,CHEWABLE ORAL EVERY 6 HOURS PRN
Status: DISCONTINUED | OUTPATIENT
Start: 2018-01-01 | End: 2018-01-01 | Stop reason: HOSPADM

## 2018-01-01 RX ORDER — FUROSEMIDE 20 MG/1
20 TABLET ORAL DAILY PRN
Qty: 30 TABLET | Refills: 3 | Status: SHIPPED | OUTPATIENT
Start: 2018-01-01 | End: 2018-01-01

## 2018-01-01 RX ORDER — FUROSEMIDE 10 MG/ML
40 INJECTION INTRAMUSCULAR; INTRAVENOUS
Status: COMPLETED | OUTPATIENT
Start: 2018-01-01 | End: 2018-01-01

## 2018-01-01 RX ORDER — LACTULOSE 10 G/15ML
15 SOLUTION ORAL EVERY 6 HOURS PRN
Status: DISCONTINUED | OUTPATIENT
Start: 2018-01-01 | End: 2018-01-01 | Stop reason: HOSPADM

## 2018-01-01 RX ORDER — FAMOTIDINE 10 MG/ML
20 INJECTION INTRAVENOUS 2 TIMES DAILY
Status: DISCONTINUED | OUTPATIENT
Start: 2018-01-01 | End: 2018-01-01 | Stop reason: HOSPADM

## 2018-01-01 RX ORDER — AZELASTINE 1 MG/ML
1 SPRAY, METERED NASAL 2 TIMES DAILY
Status: DISCONTINUED | OUTPATIENT
Start: 2018-01-01 | End: 2018-01-01 | Stop reason: HOSPADM

## 2018-01-01 RX ORDER — POTASSIUM CHLORIDE 20 MEQ/1
40 TABLET, EXTENDED RELEASE ORAL ONCE
Status: COMPLETED | OUTPATIENT
Start: 2018-01-01 | End: 2018-01-01

## 2018-01-01 RX ORDER — MOXIFLOXACIN HYDROCHLORIDE 400 MG/1
400 TABLET ORAL DAILY
Status: DISCONTINUED | OUTPATIENT
Start: 2018-01-01 | End: 2018-01-01

## 2018-01-01 RX ORDER — ACETAZOLAMIDE 250 MG/1
500 TABLET ORAL 2 TIMES DAILY
Status: DISCONTINUED | OUTPATIENT
Start: 2018-01-01 | End: 2018-01-01

## 2018-01-01 RX ORDER — FUROSEMIDE 20 MG/1
60-80 TABLET ORAL DAILY
Qty: 100 TABLET | Refills: 3 | Status: ON HOLD | OUTPATIENT
Start: 2018-01-01 | End: 2018-01-01

## 2018-01-01 RX ORDER — IPRATROPIUM BROMIDE AND ALBUTEROL SULFATE 2.5; .5 MG/3ML; MG/3ML
3 SOLUTION RESPIRATORY (INHALATION)
Status: COMPLETED | OUTPATIENT
Start: 2018-01-01 | End: 2018-01-01

## 2018-01-01 RX ORDER — FUROSEMIDE 20 MG/1
80 TABLET ORAL DAILY
Start: 2018-01-01

## 2018-01-01 RX ORDER — FENTANYL CITRATE 50 UG/ML
50 INJECTION, SOLUTION INTRAMUSCULAR; INTRAVENOUS
Status: DISCONTINUED | OUTPATIENT
Start: 2018-10-12 | End: 2018-01-01 | Stop reason: HOSPADM

## 2018-01-01 RX ORDER — MINERAL OIL
180 ENEMA (ML) RECTAL DAILY PRN
COMMUNITY

## 2018-01-01 RX ORDER — ACETAMINOPHEN 325 MG/1
650 TABLET ORAL EVERY 6 HOURS PRN
Status: DISCONTINUED | OUTPATIENT
Start: 2018-01-01 | End: 2018-01-01 | Stop reason: HOSPADM

## 2018-01-01 RX ORDER — AMOXICILLIN 250 MG
1 CAPSULE ORAL 2 TIMES DAILY
Status: DISCONTINUED | OUTPATIENT
Start: 2018-01-01 | End: 2018-01-01 | Stop reason: HOSPADM

## 2018-01-01 RX ORDER — CHLORHEXIDINE GLUCONATE ORAL RINSE 1.2 MG/ML
15 SOLUTION DENTAL 2 TIMES DAILY
Status: DISCONTINUED | OUTPATIENT
Start: 2018-01-01 | End: 2018-01-01 | Stop reason: HOSPADM

## 2018-01-01 RX ORDER — METHYLPREDNISOLONE SOD SUCC 125 MG
125 VIAL (EA) INJECTION
Status: COMPLETED | OUTPATIENT
Start: 2018-01-01 | End: 2018-01-01

## 2018-01-01 RX ORDER — FUROSEMIDE 20 MG/1
80 TABLET ORAL DAILY
Status: ON HOLD | COMMUNITY
End: 2018-01-01 | Stop reason: SDUPTHER

## 2018-01-01 RX ADMIN — SODIUM CHLORIDE 500 ML: 0.9 INJECTION, SOLUTION INTRAVENOUS at 09:10

## 2018-01-01 RX ADMIN — IPRATROPIUM BROMIDE AND ALBUTEROL SULFATE 3 ML: .5; 3 SOLUTION RESPIRATORY (INHALATION) at 05:09

## 2018-01-01 RX ADMIN — IPRATROPIUM BROMIDE AND ALBUTEROL SULFATE 3 ML: .5; 3 SOLUTION RESPIRATORY (INHALATION) at 12:10

## 2018-01-01 RX ADMIN — FUROSEMIDE 80 MG: 80 TABLET ORAL at 05:09

## 2018-01-01 RX ADMIN — FUROSEMIDE 10 MG/HR: 10 INJECTION, SOLUTION INTRAMUSCULAR; INTRAVENOUS at 09:10

## 2018-01-01 RX ADMIN — IOHEXOL 100 ML: 350 INJECTION, SOLUTION INTRAVENOUS at 11:09

## 2018-01-01 RX ADMIN — FUROSEMIDE 40 MG: 10 INJECTION, SOLUTION INTRAMUSCULAR; INTRAVENOUS at 08:09

## 2018-01-01 RX ADMIN — MOXIFLOXACIN HYDROCHLORIDE 400 MG: 400 TABLET, FILM COATED ORAL at 05:10

## 2018-01-01 RX ADMIN — IPRATROPIUM BROMIDE AND ALBUTEROL SULFATE 3 ML: .5; 3 SOLUTION RESPIRATORY (INHALATION) at 03:10

## 2018-01-01 RX ADMIN — IPRATROPIUM BROMIDE AND ALBUTEROL SULFATE 3 ML: .5; 3 SOLUTION RESPIRATORY (INHALATION) at 07:10

## 2018-01-01 RX ADMIN — CALCIUM CARBONATE (ANTACID) CHEW TAB 500 MG 500 MG: 500 CHEW TAB at 07:10

## 2018-01-01 RX ADMIN — FENTANYL CITRATE 25 MCG: 50 INJECTION, SOLUTION INTRAMUSCULAR; INTRAVENOUS at 09:10

## 2018-01-01 RX ADMIN — FLUTICASONE PROPIONATE 100 MCG: 50 SPRAY, METERED NASAL at 10:10

## 2018-01-01 RX ADMIN — FUROSEMIDE 40 MG: 10 INJECTION, SOLUTION INTRAMUSCULAR; INTRAVENOUS at 10:09

## 2018-01-01 RX ADMIN — SIMETHICONE 125 MG: 125 TABLET, CHEWABLE ORAL at 04:10

## 2018-01-01 RX ADMIN — FUROSEMIDE 80 MG: 80 TABLET ORAL at 09:10

## 2018-01-01 RX ADMIN — FUROSEMIDE 40 MG: 10 INJECTION, SOLUTION INTRAMUSCULAR; INTRAVENOUS at 05:09

## 2018-01-01 RX ADMIN — IPRATROPIUM BROMIDE AND ALBUTEROL SULFATE 3 ML: .5; 3 SOLUTION RESPIRATORY (INHALATION) at 07:09

## 2018-01-01 RX ADMIN — ACETAZOLAMIDE 500 MG: 250 TABLET ORAL at 06:10

## 2018-01-01 RX ADMIN — Medication 25 MCG/HR: at 10:10

## 2018-01-01 RX ADMIN — IPRATROPIUM BROMIDE AND ALBUTEROL SULFATE 3 ML: .5; 3 SOLUTION RESPIRATORY (INHALATION) at 08:10

## 2018-01-01 RX ADMIN — ENOXAPARIN SODIUM 40 MG: 100 INJECTION SUBCUTANEOUS at 08:10

## 2018-01-01 RX ADMIN — VANCOMYCIN HYDROCHLORIDE 1750 MG: 10 INJECTION, POWDER, LYOPHILIZED, FOR SOLUTION INTRAVENOUS at 08:09

## 2018-01-01 RX ADMIN — EPINEPHRINE 1 MG: 0.1 INJECTION, SOLUTION ENDOTRACHEAL; INTRACARDIAC; INTRAVENOUS at 09:10

## 2018-01-01 RX ADMIN — PREDNISONE 40 MG: 20 TABLET ORAL at 12:09

## 2018-01-01 RX ADMIN — NITROGLYCERIN 1 INCH: 20 OINTMENT TOPICAL at 05:09

## 2018-01-01 RX ADMIN — ENOXAPARIN SODIUM 40 MG: 100 INJECTION SUBCUTANEOUS at 09:10

## 2018-01-01 RX ADMIN — POTASSIUM CHLORIDE 40 MEQ: 1500 TABLET, EXTENDED RELEASE ORAL at 09:09

## 2018-01-01 RX ADMIN — IPRATROPIUM BROMIDE AND ALBUTEROL SULFATE 3 ML: .5; 3 SOLUTION RESPIRATORY (INHALATION) at 01:10

## 2018-01-01 RX ADMIN — GUAIFENESIN 600 MG: 600 TABLET, EXTENDED RELEASE ORAL at 08:10

## 2018-01-01 RX ADMIN — FUROSEMIDE 40 MG: 10 INJECTION, SOLUTION INTRAMUSCULAR; INTRAVENOUS at 09:09

## 2018-01-01 RX ADMIN — SODIUM CHLORIDE 2 DROP: 20 SOLUTION OPHTHALMIC at 01:09

## 2018-01-01 RX ADMIN — IPRATROPIUM BROMIDE AND ALBUTEROL SULFATE 3 ML: .5; 3 SOLUTION RESPIRATORY (INHALATION) at 12:09

## 2018-01-01 RX ADMIN — INSULIN ASPART 1 UNITS: 100 INJECTION, SOLUTION INTRAVENOUS; SUBCUTANEOUS at 11:09

## 2018-01-01 RX ADMIN — AZELASTINE HYDROCHLORIDE 137 MCG: 137 SPRAY, METERED NASAL at 10:10

## 2018-01-01 RX ADMIN — ACETAMINOPHEN 650 MG: 325 TABLET ORAL at 05:10

## 2018-01-01 RX ADMIN — PREDNISONE 40 MG: 20 TABLET ORAL at 08:09

## 2018-01-01 RX ADMIN — SODIUM POLYSTYRENE SULFONATE 15 G: 15 SUSPENSION ORAL; RECTAL at 12:09

## 2018-01-01 RX ADMIN — AZELASTINE HYDROCHLORIDE 137 MCG: 137 SPRAY, METERED NASAL at 08:10

## 2018-01-01 RX ADMIN — ACETAMINOPHEN 650 MG: 325 TABLET ORAL at 01:10

## 2018-01-01 RX ADMIN — FUROSEMIDE 80 MG: 80 TABLET ORAL at 09:09

## 2018-01-01 RX ADMIN — ACETAZOLAMIDE 500 MG: 250 TABLET ORAL at 09:10

## 2018-01-01 RX ADMIN — ENOXAPARIN SODIUM 40 MG: 100 INJECTION SUBCUTANEOUS at 10:10

## 2018-01-01 RX ADMIN — DIPHENHYDRAMINE HYDROCHLORIDE 50 MG: 50 INJECTION INTRAMUSCULAR; INTRAVENOUS at 08:05

## 2018-01-01 RX ADMIN — SODIUM CHLORIDE 2 DROP: 20 SOLUTION OPHTHALMIC at 08:09

## 2018-01-01 RX ADMIN — IPRATROPIUM BROMIDE AND ALBUTEROL SULFATE 3 ML: .5; 3 SOLUTION RESPIRATORY (INHALATION) at 01:09

## 2018-01-01 RX ADMIN — IPRATROPIUM BROMIDE AND ALBUTEROL SULFATE 3 ML: .5; 3 SOLUTION RESPIRATORY (INHALATION) at 09:09

## 2018-01-01 RX ADMIN — METHYLPREDNISOLONE SODIUM SUCCINATE 125 MG: 125 INJECTION, POWDER, FOR SOLUTION INTRAMUSCULAR; INTRAVENOUS at 05:09

## 2018-01-01 RX ADMIN — Medication 1 MCG/KG/MIN: at 10:10

## 2018-01-01 RX ADMIN — IPRATROPIUM BROMIDE AND ALBUTEROL SULFATE 3 ML: .5; 3 SOLUTION RESPIRATORY (INHALATION) at 11:09

## 2018-01-01 RX ADMIN — FAMOTIDINE 20 MG: 10 INJECTION INTRAVENOUS at 08:05

## 2018-01-01 RX ADMIN — ACETAZOLAMIDE 500 MG: 250 TABLET ORAL at 08:10

## 2018-01-01 RX ADMIN — CLOSTRIDIUM TETANI TOXOID ANTIGEN (FORMALDEHYDE INACTIVATED), CORYNEBACTERIUM DIPHTHERIAE TOXOID ANTIGEN (FORMALDEHYDE INACTIVATED), BORDETELLA PERTUSSIS TOXOID ANTIGEN (GLUTARALDEHYDE INACTIVATED), BORDETELLA PERTUSSIS FILAMENTOUS HEMAGGLUTININ ANTIGEN (FORMALDEHYDE INACTIVATED), BORDETELLA PERTUSSIS PERTACTIN ANTIGEN, AND BORDETELLA PERTUSSIS FIMBRIAE 2/3 ANTIGEN 0.5 ML: 5; 2; 2.5; 5; 3; 5 INJECTION, SUSPENSION INTRAMUSCULAR at 02:10

## 2018-01-01 RX ADMIN — IPRATROPIUM BROMIDE AND ALBUTEROL SULFATE 3 ML: .5; 3 SOLUTION RESPIRATORY (INHALATION) at 08:09

## 2018-01-01 RX ADMIN — GUAIFENESIN 600 MG: 600 TABLET, EXTENDED RELEASE ORAL at 10:10

## 2018-01-01 RX ADMIN — MOXIFLOXACIN HYDROCHLORIDE 400 MG: 400 TABLET, FILM COATED ORAL at 09:10

## 2018-01-01 RX ADMIN — FUROSEMIDE 40 MG: 10 INJECTION, SOLUTION INTRAMUSCULAR; INTRAVENOUS at 03:10

## 2018-01-01 RX ADMIN — ACETAMINOPHEN 650 MG: 325 TABLET ORAL at 08:05

## 2018-01-01 RX ADMIN — PANTOPRAZOLE SODIUM 40 MG: 40 TABLET, DELAYED RELEASE ORAL at 09:10

## 2018-01-01 RX ADMIN — CEFAZOLIN 2 G: 1 INJECTION, POWDER, FOR SOLUTION INTRAMUSCULAR; INTRAVENOUS at 08:09

## 2018-01-01 RX ADMIN — GUAIFENESIN 600 MG: 600 TABLET, EXTENDED RELEASE ORAL at 09:10

## 2018-01-01 RX ADMIN — HUMAN IMMUNOGLOBULIN G 75 G: 40 LIQUID INTRAVENOUS at 09:05

## 2018-01-01 RX ADMIN — IPRATROPIUM BROMIDE AND ALBUTEROL SULFATE 3 ML: .5; 3 SOLUTION RESPIRATORY (INHALATION) at 04:09

## 2018-01-01 RX ADMIN — FUROSEMIDE 80 MG: 80 TABLET ORAL at 06:09

## 2018-01-01 RX ADMIN — FLUTICASONE PROPIONATE 100 MCG: 50 SPRAY, METERED NASAL at 08:10

## 2018-01-01 RX ADMIN — FUROSEMIDE 10 MG/HR: 10 INJECTION, SOLUTION INTRAMUSCULAR; INTRAVENOUS at 12:10

## 2018-01-01 RX ADMIN — FLUTICASONE PROPIONATE 100 MCG: 50 SPRAY, METERED NASAL at 09:10

## 2018-01-01 RX ADMIN — METHYLPREDNISOLONE SODIUM SUCCINATE 80 MG: 125 INJECTION, POWDER, FOR SOLUTION INTRAMUSCULAR; INTRAVENOUS at 03:10

## 2018-01-01 RX ADMIN — AZELASTINE HYDROCHLORIDE 137 MCG: 137 SPRAY, METERED NASAL at 09:10

## 2018-01-01 RX ADMIN — FUROSEMIDE 40 MG: 10 INJECTION, SOLUTION INTRAMUSCULAR; INTRAVENOUS at 03:09

## 2018-01-01 RX ADMIN — INFLUENZA A VIRUS A/MICHIGAN/45/2015 X-275 (H1N1) ANTIGEN (FORMALDEHYDE INACTIVATED), INFLUENZA A VIRUS A/SINGAPORE/INFIMH-16-0019/2016 IVR-186 (H3N2) ANTIGEN (FORMALDEHYDE INACTIVATED), INFLUENZA B VIRUS B/PHUKET/3073/2013 ANTIGEN (FORMALDEHYDE INACTIVATED), AND INFLUENZA B VIRUS B/MARYLAND/15/2016 BX-69A ANTIGEN (FORMALDEHYDE INACTIVATED) 0.5 ML: 15; 15; 15; 15 INJECTION, SUSPENSION INTRAMUSCULAR at 05:09

## 2018-01-05 PROBLEM — R04.0 EPISTAXIS: Status: ACTIVE | Noted: 2018-01-01

## 2018-01-06 NOTE — PATIENT INSTRUCTIONS
Ask O2 provider to provide a humidifier for his O2 concentrator (he will let me know if they need an order).  Begin saline NS to help prevent nasal bleeding.  Trial with Cellcept 1500 mg twice daily (instructed).  Hold Doxycycline to use if he has signs/sympt of acute bronchitis (stop Cellcept while taking antibiotic).  Encouraged continued weight loss and exercise.  Return visit 6-8 weeks with CBC, Spirometry, and DLCO.

## 2018-01-06 NOTE — PROGRESS NOTES
Subjective:       Patient ID: Patrick Dennis is a 57 y.o. male.    Chief Complaint: Interstitial Lung Disease    HPI HISTORY OF PRESENT ILLNESS:  Mr. Dennis is a 57-year-old nonsmoker who returns   for interval assessment of chronic interstitial lung disease.  Following his   visit here in July of last year, he began a trial of treatment with CellCept.    Since mid August he has taken a daily dose of 2000 mg.  He has not recognized any   significant improvement in his respiratory status during these past three   months.  Shortly after beginning CellCept, he tapered and discontinued   prednisone.  He has been off prednisone now for at least the past two months.    Mr. Dennis has had some mild nausea or abdominal bloating, which he attributes   to CellCept.  Nevertheless, he has been able to stay on this medication as   prescribed.  He continues to have a daily cough with a small amount of sputum.    This material is usually not purulent in character.  He has not been aware of   any wheezing.  He has not had hemoptysis or any pleuritic chest pain.    Mr. Dennis is currently using supplemental oxygen at 2 L per min during the   nighttime.  He has had episodes of mild nasal bleeding, which may be a   consequence of drying of his nasal passages related to the oxygen.  He does not   have a humidifier for his oxygen concentrator.    Despite his ongoing respiratory symptoms, Mr. Dennis continues to work and is   reluctant to stop.  He feels that the physical activity at work has been   helpful in his efforts to decrease weight.  He has had a decrease of   approximately 25 to 30 pounds in weight during the past four to six months.    He attributes this to an improved diet and modest exercise.    DATA:  I reviewed the results of pulmonary function studies done earlier today.    The forced vital capacity is 1.38 L, which is 32% of predicted.  The FEV1 is   1.20 L, or 34% or predicted.  The ratio of these values is  87%.  The diffusion   capacity is 12.8, which is 47% of the expected value.  When today's study   results are compared to the previous study from July, there appears to have been   a marginal decline in ventilatory function.    A CBC done today shows the white blood count 7200, hemoglobin 16.1, and platelet   count 242,000.  These are normal values.      CB/HN  dd: 01/05/2018 20:04:32 (CST)  td: 01/06/2018 14:46:34 (CST)  Doc ID   #[id number]  Job ID #[id number]    CC:       Review of Systems   Constitutional: Positive for weight loss. Negative for fever and fatigue.   HENT: Positive for nosebleeds. Negative for postnasal drip, sinus pressure, voice change and congestion.    Respiratory: Positive for cough, shortness of breath and dyspnea on extertion. Negative for sputum production and wheezing.    Cardiovascular: Negative for chest pain and leg swelling.   Genitourinary: Negative for difficulty urinating.   Musculoskeletal: Negative for arthralgias and back pain.   Skin: Negative for rash.   Gastrointestinal: Positive for nausea. Negative for abdominal pain and acid reflux.   Neurological: Negative for dizziness and weakness.   Hematological: Negative for adenopathy.       Objective:      Physical Exam   Constitutional: He is oriented to person, place, and time. He appears well-developed. He is obese.   HENT:   Head: Normocephalic.   Mouth/Throat: Oropharynx is clear and moist. No oropharyngeal exudate.   Neck: Normal range of motion. Neck supple. No JVD present. No tracheal deviation present. No thyromegaly present.   Cardiovascular: Normal rate, regular rhythm and normal heart sounds.    No murmur heard.  Pulmonary/Chest: Symmetric chest wall expansion. No stridor. He has no wheezes. He has no rhonchi. He has rales (bibasilar rales). He exhibits no tenderness.   Abdominal: Soft.   Musculoskeletal: He exhibits no edema.   Lymphadenopathy:     He has no cervical adenopathy.   Neurological: He is alert and oriented to  person, place, and time.   Skin: Skin is warm and dry. No rash noted. No erythema. Nails show no clubbing.   Psychiatric: He has a normal mood and affect.   Vitals reviewed.    Personal Diagnostic Review    No flowsheet data found.      Assessment:       1. ILD (interstitial lung disease)    2. Morbid obesity due to excess calories    3. Exertional dyspnea    4. Acute bronchitis, unspecified organism        Outpatient Encounter Prescriptions as of 1/5/2018   Medication Sig Dispense Refill    azelaic acid (FINACEA) 15 % Foam Use hs on face 50 g 3    clindamycin (CLEOCIN T) 1 % lotion Use hs on face 60 mL 3    doxycycline (VIBRA-TABS) 100 MG tablet Take 1 tablet (100 mg total) by mouth every 12 (twelve) hours. 20 tablet 1    mycophenolate (CELLCEPT) 500 mg Tab 1 tab twice daily x 2 weeks, then 2 tab twice daily afterward. 120 tablet 6     No facility-administered encounter medications on file as of 1/5/2018.      Orders Placed This Encounter   Procedures    CBC auto differential     Standing Status:   Future     Standing Expiration Date:   5/5/2018    Spirometry without bronchodilator     Standing Status:   Future     Standing Expiration Date:   5/5/2018    DLCO-Carbon Monoxide Diffusing Capacity     Standing Status:   Future     Standing Expiration Date:   5/5/2018     Plan:    Ask O2 provider to provide a humidifier for his O2 concentrator (he will let me know if they need an order).  Begin saline NS to help prevent nasal bleeding.  Trial with Cellcept 1500 mg twice daily (instructed).  Hold Doxycycline to use if he has signs/sympt of acute bronchitis (stop Cellcept while taking antibiotic).  Encouraged continued weight loss and exercise.  Return visit 6-8 weeks with CBC, Spirometry, and DLCO.

## 2018-02-02 NOTE — TELEPHONE ENCOUNTER
----- Message from Waqar Yan MD sent at 1/29/2018  8:18 AM CST -----  pls call to schedule fu appt for re-evaluation at his convenience. He did not respond as well to the Prevnar as I'd like. This may be due to cellcept therapy though.

## 2018-02-02 NOTE — TELEPHONE ENCOUNTER
----- Message from Kimberly Vasquez sent at 2/2/2018  1:38 PM CST -----  Contact: Mother/[phone]  Pt's mother is calling to have someone in the office call as soon as possible. Please call and advise.    Thank You

## 2018-02-08 NOTE — TELEPHONE ENCOUNTER
----- Message from Racheal Clarke sent at 2/7/2018 12:32 PM CST -----  Contact: self [phone]  Pt is requesting a call from office. Please, advise. Thanks

## 2018-02-19 NOTE — Clinical Note
Dr. Solorio,  Do you have any experience with high-dose IVIG for ILD?  I don't, but I came across limited reports supporting that it may be helpful in refractory cases. I'll email you an article. If you think he may benefit at any time b/c he's failing other therapies, I can write the high-dose IVIG for him. He'd probably need infusions 4-5 days in a row, monthly, x at least 3 months. Thanks,  López

## 2018-02-19 NOTE — LETTER
February 19, 2018                 [facility]  Allergy  [address]  [facility] [address]  Phone: [phone]  Fax: [phone]   February 19, 2018     Patient: Patrick Dennis   YOB: 1960   Date of Visit: 2/19/2018       To Whom it May Concern:    Patrick Dennis was seen in my clinic on 2/19/2018.   If you have any questions or concerns, please don't hesitate to call.    Sincerely,         Waqar Yan MD

## 2018-02-19 NOTE — PROGRESS NOTES
Subjective:       Patient ID: Patrick Dennis is a 57 y.o. male.     10/2/17    Chief Complaint:   Fu cough, recurrent bronchitis, ILD    Shortness of Breath   Pertinent negatives include no chest pain, fever, headaches, rash, rhinorrhea, vomiting or wheezing.       Pt presents w mother.  Has hx interstitial lung disease, followed by pulmonary, with negative fungal precipitins panel and IgE < 35 IU/ml.  Chest CTs have shown bilateral reticular ground-glass opacities and associated bronchiectasis. Is on cellcept now x ~2 mo, rx'd by pulmonary. Recently dose increased from 1000 mg twice daily to 1500 mg twice daily.  Can't tell that cough is sig improved. Still w seemingly progressive dyspnea on exertion over last year. Hasn't been on abx in several months. Has doxycycline on hand, but has not had to use in recent months.  Has had partial response to Pneumovax and poor response to Prevnar, likely secondary to cellcept therapy        Family History   Problem Relation Age of Onset    No Known Problems Mother     No Known Problems Father     Asthma Other     Emphysema Neg Hx          Environmental History: Pets in the home: dogs (2).  Caro: tile or linoleum floors and [id number] carpeting  Tobacco Smoke in Home: no   Non-smoker    Past Medical History:   Diagnosis Date    Cough    rosacea--followed by dermatology    Family History   Problem Relation Age of Onset    No Known Problems Mother     No Known Problems Father     Asthma Other     Emphysema Neg Hx    no parental atopy or asthma      Review of Systems   Constitutional: Negative for activity change, fatigue and fever.   HENT: Negative for congestion, postnasal drip, rhinorrhea, sinus pressure and sneezing.    Eyes: Negative for discharge, redness and itching.   Respiratory: Positive for cough and shortness of breath. Negative for wheezing.    Cardiovascular: Negative for chest pain.   Gastrointestinal: Negative for constipation, diarrhea,  nausea and vomiting.   Genitourinary: Negative for difficulty urinating.   Musculoskeletal: Negative for joint swelling and myalgias.   Skin: Negative for rash.   Neurological: Negative for headaches.   Hematological: Does not bruise/bleed easily.   Psychiatric/Behavioral: Negative for behavioral problems and sleep disturbance.        Objective:    Physical Exam   Constitutional: He is oriented to person, place, and time. He appears well-developed and well-nourished. No distress.   HENT:   Head: Normocephalic and atraumatic.   Right Ear: Tympanic membrane and external ear normal.   Left Ear: Tympanic membrane and external ear normal.   Nose: Nose normal.   Mouth/Throat: Oropharynx is clear and moist. No oropharyngeal exudate.   Eyes: Conjunctivae are normal. Right eye exhibits no discharge. Left eye exhibits no discharge.   Neck: Normal range of motion. Neck supple. No thyromegaly present.   Cardiovascular: Normal rate and regular rhythm.    Pulmonary/Chest: Effort normal and breath sounds normal. No respiratory distress. He has no wheezes.   Abdominal: Soft. Bowel sounds are normal. He exhibits no distension. There is no tenderness.   Musculoskeletal: Normal range of motion. He exhibits no edema.   Lymphadenopathy:     He has no cervical adenopathy.   Neurological: He is alert and oriented to person, place, and time. He exhibits normal muscle tone.   Skin: Skin is warm and dry. He is not diaphoretic. No erythema.   Erythematous cheeks, forehead w few papular lesions   Psychiatric: He has a normal mood and affect. His behavior is normal. Judgment and thought content normal.         immunoCAPs positive to DM  Nl IgGAM  Results for TIFFANIE OGLESBY (MRN [id number]) as of 2/19/2018 16:17   Ref. Range 9/18/2017 11:54 11/11/2017 10:11 1/20/2018 09:11   S.pneumoniae Type 1 Latest Units: mcg/mL 0.6 1.1 1.0   S.pneumoniae Type 3 Latest Units: mcg/mL 1.1 1.2 1.2   Strep pneumo Type 4 Latest Units: mcg/mL <0.3 <0.3 <0.3    S.pneumoniae Type 5 Latest Units: mcg/mL 0.3 0.7 0.4   S.pneumoniae Type 6B Latest Units: mcg/mL <0.3 0.6 0.3   S.pneumoniae Type 7F Latest Units: mcg/mL 0.3 0.9 0.6   S.pneumoniae Type 8 Latest Units: mcg/mL 0.5 2.3 2.1   S.pneumoniae Type 9N Latest Units: mcg/mL <0.3 0.6 0.5   S.pneumoniae Type 9V Abs Latest Units: mcg/mL 1.5 5.5 5.5   S.pneumoniae Type 12F Latest Units: mcg/mL <0.3 <0.3 <0.3   Strep pneumo Type 14 Latest Units: mcg/mL 4.2 64.8 61.6   S.pneumoniae Type 18C Latest Units: mcg/mL 1.4 38.5 29.4   S.pneumoniae Type 19F Latest Units: mcg/mL 0.7 2.7 3.3   S.pneumoniae Type 23F Latest Units: mcg/mL <0.3 0.3 <0.3       Assessment:       1. ILD (interstitial lung disease)    2. Abnormal antibody titer--partial pneumovax response. Poor Prevnar response. Likely 2/2 immunosuppresive therapy for ILD   3. Allergic rhinitis to DM            Plan:       1. Counseled that low pneumococcal titers are more likely 2/2 immunesuppression than to specific antibody deficiency  2. Cont cellept as per pulm  3. There are few anecdotal reports and case studies reporting that high-dose IVIG (~2g/kg/mo x 3 mo) may be helpful adjunct therapy for ILD, though there is not enough evidence to support routine use in ILD. May consider this option if does not have good response to other tx't for ILD  4. Dust mite avoidance and flonase prn for AR

## 2018-04-17 PROBLEM — J96.11 CHRONIC RESPIRATORY FAILURE WITH HYPOXIA: Status: ACTIVE | Noted: 2018-01-01

## 2018-04-17 NOTE — LETTER
April 17, 2018      [facility] - Pulmonary Services  [address]  [facility] [address]  Phone: [phone]       Patient: Patrick Dennis   YOB: 1960  Date of Visit: 04/17/2018    To Whom It May Concern:    Gurwinder Dennis  was at [facility] on 04/17/2018. He may return to work on 4/18/2018 with no restrictions. If you have any questions or concerns, or if I can be of further assistance, please do not hesitate to contact me.    Sincerely,            SAMANTHA Solorio MD

## 2018-04-18 NOTE — PROGRESS NOTES
Subjective:       Patient ID: Patrick Dennis is a 57 y.o. male.    Chief Complaint: Interstitial Lung Disease    HPI HISTORY OF PRESENT ILLNESS:  Mr. Dennis is a 57-year-old nonsmoker, who comes   for interval assessment of chronic interstitial lung disease.  Since his visit   here in January, he has taken CellCept at an increased dose of 1500 mg twice   daily.  He has noticed some increased GI symptoms at this higher dose.    Unfortunately, he has not recognized any improvement in his respiratory   symptoms.    Mr. Dennis reports that he did have a recent episode of chest congestion and   sore throat.  He noted increased sputum, which became discolored.  He took   doxycycline and feels that he had a beneficial response.  His sputum is now   nonpurulent.  He has begun to have a recurrent soreness in his throat.  He is   concerned that he might be developing a recurrent respiratory infection.    Mr. Dennis has been prescribed supplemental oxygen, but has not made consistent   use of this during the day.  He has used the oxygen at nighttime when sleeping.    He reports being out of breath with modest daily activities.    Mr. Dennis had a recent evaluation in the Allergy Clinic.  Studies there   revealed a blunted immune response.  It was felt that this may be a reflection   of the treatment with CellCept rather than evidence of an underlying immune   deficiency.    DATA:   I have reviewed the results of recent pulmonary function studies.  The   forced vital capacity is 1.29 L, which is 28% of predicted.  The FEV1 is 1.16 L   or 32% of predicted.  The ratio of these values is 90%.  The diffusion capacity   is 10.1, which is 37% of the expected value.  When these recent study results   are compared to previous studies from January and July, there appears to have   been an interval decline in the spirometry values as well as with the diffusion   capacity.    A CBC done recently does not show any important  abnormal findings with regard to   the white blood count or platelet count.      CB/HN  dd: 04/17/2018 20:06:17 (CDT)  td: 04/18/2018 14:24:54 (CDT)  Doc ID   #[id number]  Job ID #[id number]    CC:       Review of Systems   Constitutional: Positive for fatigue. Negative for fever.   HENT: Positive for sore throat and congestion. Negative for postnasal drip, sinus pressure and voice change.    Respiratory: Positive for cough, sputum production and dyspnea on extertion. Negative for shortness of breath and wheezing.    Cardiovascular: Negative for chest pain and leg swelling.   Genitourinary: Negative for difficulty urinating.   Musculoskeletal: Negative for arthralgias and back pain.   Skin: Negative for rash.   Gastrointestinal: Negative for abdominal pain and acid reflux.   Neurological: Negative for dizziness and weakness.   Hematological: Negative for adenopathy.       Objective:      Physical Exam   Constitutional: He is oriented to person, place, and time. He appears well-developed. He is obese.   HENT:   Head: Normocephalic.   Mouth/Throat: Oropharynx is clear and moist. No oropharyngeal exudate.   Neck: Normal range of motion. Neck supple. No JVD present. No tracheal deviation present. No thyromegaly present.   Cardiovascular: Normal rate, regular rhythm and normal heart sounds.    No murmur heard.  Pulmonary/Chest: Symmetric chest wall expansion. No stridor. He has no wheezes. He has no rhonchi. He has rales (bilateral rales posteriorly). He exhibits no tenderness.   Abdominal: Soft.   Musculoskeletal: He exhibits no edema.   Lymphadenopathy:     He has no cervical adenopathy.   Neurological: He is alert and oriented to person, place, and time.   Skin: Skin is warm and dry. No rash noted. No erythema. Nails show no clubbing.   Psychiatric: He has a normal mood and affect.   Vitals reviewed.    Personal Diagnostic Review    Pulmonary Function Tests 4/17/2018   SpO2 88   Height 70.000   Weight 4550.29   BMI  (Calculated) 40.9   Some recent data might be hidden         Assessment:       1. ILD (interstitial lung disease)    2. Acute bronchitis, unspecified organism    3. Morbid obesity due to excess calories    4. Interstitial lung disease    5. Chronic respiratory failure with hypoxia        Outpatient Encounter Prescriptions as of 4/17/2018   Medication Sig Dispense Refill    amoxicillin-clavulanate 875-125mg (AUGMENTIN) 875-125 mg per tablet Take 1 tablet by mouth 2 (two) times daily. 20 tablet 1    azelaic acid (FINACEA) 15 % Foam Use hs on face 50 g 3    clindamycin (CLEOCIN T) 1 % lotion Use hs on face 60 mL 3    mycophenolate (CELLCEPT) 500 mg Tab Take 500 mg by mouth 2 (two) times daily.      [DISCONTINUED] doxycycline (VIBRA-TABS) 100 MG tablet Take 1 tablet (100 mg total) by mouth every 12 (twelve) hours. 20 tablet 1     No facility-administered encounter medications on file as of 4/17/2018.      Orders Placed This Encounter   Procedures    CT Chest Without Contrast     Standing Status:   Future     Number of Occurrences:   1     Standing Expiration Date:   4/17/2019     Plan:     Encouraged more regular use of O2 (goal >18 hrs/24).  Decrease Cellcept dose to 1000 mg twice daily.  CT Chest (phone report).  Consider request for OFEV if CT confirms progression of disease.  Discuss trial with IVIG with Allergy (see note by Dr Yan).  Hold Augmentin to use if resp congestion increases (instructed).

## 2018-04-18 NOTE — PATIENT INSTRUCTIONS
Encouraged more regular use of O2 (goal >18 hrs/24).  Decrease Cellcept dose to 1000 mg twice daily.  CT Chest (phone report).  Consider request for OFEV if CT confirms progression of disease.  Discuss trial with IVIG with Allergy (see note by Dr Yan).  Hold Augmentin to use if resp congestion increases (instructed).

## 2018-04-25 NOTE — TELEPHONE ENCOUNTER
----- Message from Racheal Clarke sent at 4/25/2018 11:33 AM CDT -----  Contact: Swathi [phone]  Patient's wife requesting a call from the office in regards to patient health . Please advise,Thansk       Please advise , Thanks

## 2018-04-25 NOTE — TELEPHONE ENCOUNTER
Patient asked me to call his mom at [phone] regarding his health.    Per mom, patient seen Dr. Solorio and they have discuss starting infusions. Mom stated that patient is still getting sick and needs to know what the next step is.     Please advise.

## 2018-05-10 PROBLEM — D80.6 ANTI-POLYSACCHARIDE ANTIBODY DEFICIENCY: Status: ACTIVE | Noted: 2018-01-01

## 2018-05-10 NOTE — Clinical Note
Dr. Solorio, I discussed IVIG w Mr. Dennis. We'll start monthly immune deficiency doses of IVIG (~600mg/kg/mo). My thought is that if OFEV doesn't help or is not approved, I could try the higher doses IVIG--2000 mg/kg/mo, but it looks like supporting evidence of effectiveness of IVIG for ILD is scant. Thanks, López

## 2018-05-10 NOTE — LETTER
May 10, 2018      [facility]  [address]  [facility] [address]  Phone: [phone]  Fax: [phone]       Patient: Patrick Dennis   YOB: 1960  Date of Visit: 05/10/2018    To Whom It May Concern:    Gurwinder Dennis  was at [facility] on 05/10/2018. Mr. Dennis needs to remain on oxygen, can only walk short distances and can not lift anything heavier than 10 lbs. If you have any questions or concerns, or if I can be of further assistance, please do not hesitate to contact me.    Sincerely,        Elizabeth A Bosworth, LPN

## 2018-05-10 NOTE — PROGRESS NOTES
Subjective:       Patient ID: Patrick Dennis is a 57 y.o. male.    LV 2/19/18    Chief Complaint:   Fu cough, recurrent bronchitis, ILD  Interested in IVIG    Shortness of Breath   Pertinent negatives include no chest pain, fever, headaches, rash, rhinorrhea, vomiting or wheezing.       Pt presents w mother.  Has hx interstitial lung disease, followed by pulmonary, with negative fungal precipitins panel and IgE < 35 IU/ml.  Chest CTs have shown bilateral reticular ground-glass opacities and associated bronchiectasis. CT findings c/w chronic ILD, progressed from 1/10/17 CT chest to 4/17/18 study.  Since LV has had progressive dyspnea, sob on exertion. Now on more regular use of O2, per pulmonary.  Cellcept dose has been decreased to 1000 mg twice daily.  Denies recent need for abx, though does have a contingent course of augmentin  Has had partial response to Pneumovax and poor response to Prevnar, likely secondary to cellcept therapy  Per pulmonary note OFEV/nintendanib may be consider w progression of disease.      Family History   Problem Relation Age of Onset    No Known Problems Mother     No Known Problems Father     Asthma Other     Emphysema Neg Hx          Environmental History: Pets in the home: dogs (2).  Caro: tile or linoleum floors and [id number] carpeting  Tobacco Smoke in Home: no   Non-smoker    Past Medical History:   Diagnosis Date    Cough    rosacea--followed by dermatology    Family History   Problem Relation Age of Onset    No Known Problems Mother     No Known Problems Father     Asthma Other     Emphysema Neg Hx    no parental atopy or asthma      Review of Systems   Constitutional: Negative for activity change, fatigue and fever.   HENT: Negative for congestion, postnasal drip, rhinorrhea, sinus pressure and sneezing.    Eyes: Negative for discharge, redness and itching.   Respiratory: Positive for shortness of breath. Negative for cough and wheezing.     Cardiovascular: Negative for chest pain.   Gastrointestinal: Negative for constipation, diarrhea, nausea and vomiting.   Genitourinary: Negative for difficulty urinating.   Musculoskeletal: Negative for joint swelling and myalgias.   Skin: Negative for rash.   Neurological: Negative for headaches.   Hematological: Does not bruise/bleed easily.   Psychiatric/Behavioral: Negative for behavioral problems and sleep disturbance.        Objective:    Physical Exam   Constitutional: He is oriented to person, place, and time. He appears well-developed and well-nourished. No distress.   HENT:   Head: Normocephalic and atraumatic.   Right Ear: Tympanic membrane and external ear normal.   Left Ear: Tympanic membrane and external ear normal.   Nose: Nose normal.   Mouth/Throat: Oropharynx is clear and moist. No oropharyngeal exudate.   Eyes: Conjunctivae are normal. Right eye exhibits no discharge. Left eye exhibits no discharge.   Neck: Normal range of motion. Neck supple. No thyromegaly present.   Cardiovascular: Normal rate and regular rhythm.    Pulmonary/Chest: Effort normal and breath sounds normal. No respiratory distress. He has no wheezes.   3L O2 via nc   Abdominal: Soft. Bowel sounds are normal. He exhibits no distension. There is no tenderness.   Musculoskeletal: Normal range of motion. He exhibits no edema.   Lymphadenopathy:     He has no cervical adenopathy.   Neurological: He is alert and oriented to person, place, and time. He exhibits normal muscle tone.   Skin: Skin is warm and dry. He is not diaphoretic. No erythema.   Erythematous cheeks, forehead w few papular lesions   Psychiatric: He has a normal mood and affect. His behavior is normal. Judgment and thought content normal.         immunoCAPs positive to DM  Nl IgGAM  Results for TIFFANIE OGLESBY (MRN [id number]) as of 2/19/2018 16:17   Ref. Range 9/18/2017 11:54 11/11/2017 10:11 1/20/2018 09:11   S.pneumoniae Type 1 Latest Units: mcg/mL 0.6 1.1  1.0   S.pneumoniae Type 3 Latest Units: mcg/mL 1.1 1.2 1.2   Strep pneumo Type 4 Latest Units: mcg/mL <0.3 <0.3 <0.3   S.pneumoniae Type 5 Latest Units: mcg/mL 0.3 0.7 0.4   S.pneumoniae Type 6B Latest Units: mcg/mL <0.3 0.6 0.3   S.pneumoniae Type 7F Latest Units: mcg/mL 0.3 0.9 0.6   S.pneumoniae Type 8 Latest Units: mcg/mL 0.5 2.3 2.1   S.pneumoniae Type 9N Latest Units: mcg/mL <0.3 0.6 0.5   S.pneumoniae Type 9V Abs Latest Units: mcg/mL 1.5 5.5 5.5   S.pneumoniae Type 12F Latest Units: mcg/mL <0.3 <0.3 <0.3   Strep pneumo Type 14 Latest Units: mcg/mL 4.2 64.8 61.6   S.pneumoniae Type 18C Latest Units: mcg/mL 1.4 38.5 29.4   S.pneumoniae Type 19F Latest Units: mcg/mL 0.7 2.7 3.3   S.pneumoniae Type 23F Latest Units: mcg/mL <0.3 0.3 <0.3       4/17/18 CT CHEST:    CT findings of chronic interstitial lung disease, which appear to have progressed in comparison to the prior study dated 07/10/2017.   Differential considerations include NSIP and subacute hypersensitivity pneumonitis, with UIP thought to be less likely given the absence of honeycombing.  Enlargement of the pulmonary arteries, which may be seen in the setting of pulmonary arterial hypertension.  Recommend correlation with pulmonary artery pressures.  Mitral annular and apparatus calcification.  Mild coronary artery calcification.  Cholelithiasis.    Assessment:       1. ILD (interstitial lung disease)    2. Speicific Antibody def/Abnormal antibody titer--partial pneumovax response. Poor Prevnar response. Poss  2/2 immunosuppresive therapy for ILD                Plan:       1. Will proceed w trial IVIG, 75 g q 4 weeks (590 mg/kg/mo), replacement dosing for immune deficiency, specific antibody deficiency.   Discussed poss higher doses, ie ~2g/kg/mo x 3 mo for ILD. discussed that evidence for this as tx'd for ILD is scant, mostly anecdotal/case studies. May reconsider at later time if no improvement w poss OFEV tx't being considered, and if tolerates IgG  infusions.    2. Cont cellept as per pulmonary  3. O2 as per pulmonary

## 2018-05-12 NOTE — PROGRESS NOTES
Subjective:       Patient ID: Patrick Dennis is a 57 y.o. male.    Chief Complaint: Interstitial Lung Disease    HPI HISTORY OF PRESENT ILLNESS:  Mr. Dennis is a 57-year-old nonsmoker, who comes   to discuss treatment of chronic interstitial lung disease.  He has not   recognized any major change in his respiratory status since his visit here last   month.  He did begin to have a more productive cough within the past 24 hours,   but no other symptoms to suggest an active respiratory infection.  Following his   visit here last month, he had a CT scan of the chest which showed increased   areas of ground-glass opacity and reticulation within his lungs in comparison to   the previous scan from July 2017.    Mr. Dennis remains on CellCept 1000 mg twice daily.  He had taken a higher dose   for a period of time, but we decreased that due to associated GI side effects.    He also continues to use oxygen at 3 L per minute.    Mr. Dennis has had an evaluation in the Allergy/Immunology Clinic recently.  A   plan has been made for him to begin immunoglobulin infusions to treat a possible   polysaccharide antibody deficiency.    Mr. Dennis has tried to continue working despite his respiratory impairment.    He reports that there is a growing reluctance from his employers for him to work   with supplemental oxygen.  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    At the time of his previous visit, we had discussed the possibility of applying   for the medication OFEV, and he is interested in pursuing this.  He also has been   pressed by his mother to obtain an opinion from the Lung Transplant Clinic   regarding his possible candidacy for transplant.      TELLY/KELLY  dd: 05/11/2018 21:41:15 (CDT)  td: 05/12/2018 01:05:35 (CDT)  Doc ID   #[id number]  Job ID #[id number]    CC:       Review of Systems    Objective:      Physical Exam  Personal Diagnostic Review    No flowsheet data found.      Assessment:       1. Anti-polysaccharide antibody  deficiency    2. ILD (interstitial lung disease)    3. Chronic respiratory failure with hypoxia    4. Morbid obesity due to excess calories        Outpatient Encounter Prescriptions as of 5/11/2018   Medication Sig Dispense Refill    azelaic acid (FINACEA) 15 % Foam Use hs on face 50 g 3    clindamycin (CLEOCIN T) 1 % lotion Use hs on face 60 mL 3    mycophenolate (CELLCEPT) 500 mg Tab Take 500 mg by mouth 2 (two) times daily.      nintedanib (OFEV) 150 mg Cap Take 150 mg by mouth 2 (two) times daily. 60 capsule 6     No facility-administered encounter medications on file as of 5/11/2018.      Orders Placed This Encounter   Procedures    Ambulatory consult to Transplant, Lung     Referral Priority:   Routine     Referral Type:   Transplants     Referral Reason:   Second Opinion     Number of Visits Requested:   1     Plan:     Request OFEV 150 mg twice daily.  Consult Lung Transplant.  Continue O2 at 3-5 LPM with activities, and 2-3 LPM at rest.  Begin Doxycycline if cough continues to be productive.  Continue diet and weight loss.    NOTE:35 min visit with all time counseling patient/family   regarding management of ILD and resp failure.

## 2018-05-12 NOTE — PATIENT INSTRUCTIONS
Request OFEV 150 mg twice daily.  Consult Lung Transplant.  Continue O2 at 3-5 LPM with activities, and 2-3 LPM at rest.  Begin Doxycycline if cough continues to be productive.  Continue diet and weight loss.

## 2018-05-16 NOTE — LETTER
05/24/2018    BAILEY Solorio  [address] [facility] [address]  Phone: [phone]  Fax: [phone]         Dear Dr. BAILEY Solorio    Patient: Patrick Dennis     MR Number: [id number]     YOB: 1960       Thank you for the referral of Patrick Dennis to our lung transplant program. The patient is not interested in scheduling a consultation appointment at this time.  He will call if he would like to schedule in the future.    Thank you again for your trust in our program. If there is anything we can do for you or your staff, please feel free to contact us at [phone].    Sincerely,         Lewis Monique MD   Director, Lung Transplantation   Pulmonary & Critical Care Medicine    [facility]  [address] [place] [id number]  [phone]

## 2018-05-16 NOTE — TELEPHONE ENCOUNTER
Authorization for IVIG treatment tomorrow still pending approval.  Called and notified patient.  Will call him back tomorrow with update and get him rescheduled.  Pt verbalized understanding.  Direct line to infusion suite given in case patient has any additional questions in the meantime.

## 2018-05-24 NOTE — TELEPHONE ENCOUNTER
Notified patient's wife that clearance for consult has been obtained.  She states that they are not interested in scheduling an appointment at this time and understand that he should lose some weight.  Explained that he would need to get below a BMI of 34, which would translate to a weight of 230.  Explained that if he is able to lose more weight than that, it would be better.  She verbalized understanding and states they will contact me in the future if they would like to schedule.

## 2018-05-25 NOTE — TELEPHONE ENCOUNTER
Called to set up Peer to Peer. The only availability was Tuesday at 12:40.  Set up peer to peer with Josemanuel PRABHAKAR     He will call [phone] and be transferred to Dr. Yan.  Will take approx 10 minutes.

## 2018-05-29 NOTE — PROGRESS NOTES
Reza initiated at 36 cc/hr and increased approximately q15 minutes per  rates and completed at 468cc/hr.

## 2018-05-29 NOTE — PROGRESS NOTES
Received IVIG 75 gms IV, premeds tylenol, pepcid and benadryl given, tolerated well, left unit in NAD

## 2018-05-30 NOTE — TELEPHONE ENCOUNTER
Spoke with mother, explained Dr. Yan has a scheduled peer to peer with the insurance company 6/6/2018. Verbalized understanding and thanked for the phone call----- Message from Kimberly Vasquez sent at 5/30/2018  2:04 PM CDT -----  Contact: Swathi/Mother/[phone]  Pt's mother is calling to speak with someone in the office in regards to getting pt's infusion procedure approved. She states that they are asking that  calls the insurance company. Please advise.      Thanks

## 2018-06-12 NOTE — TELEPHONE ENCOUNTER
Informed patient that infusion is not covered in a hospital setting. Infusions only covered at home.  Patient is fine with that. Explained to patient that I will send off rx to either [facility] or another outside pharmacy that provides nursing service for infusions. That company will then contact you to set up infusions.  Patient verbalized understanding but added that he travels back and forth from [place] to [place].       Dr. Yan can you print rx so I can fax to care team.    Thanks

## 2018-06-12 NOTE — TELEPHONE ENCOUNTER
----- Message from Tracey Callaway MA sent at 6/12/2018  9:38 AM CDT -----  Contact: self/[phone]  Pt is calling for an update on his infusion for this month. Please advise.    Thanks

## 2018-06-14 NOTE — TELEPHONE ENCOUNTER
Labs, Demographics, Insurance info, Privigen Rx, and all clinicals faxed to [facility] [phone].  Paperwork placed in file cabinet.

## 2018-07-02 NOTE — TELEPHONE ENCOUNTER
----- Message from Aliza Balderas LPN sent at 6/29/2018  4:46 PM CDT -----  Contact: FLAKITA Echevarria/[phone]  I telephoned mom, but was told she wanted to speak with you because you were helping her set up an appointment.     Aliza  ----- Message -----  From: Tracey Callaway MA  Sent: 6/29/2018   9:10 AM  To: Farrah Zavaleta Staff    Pt's mom is waiting to hear from someone regarding his infusion appt. Please advise.    Thanks

## 2018-07-02 NOTE — TELEPHONE ENCOUNTER
Spoke to patient. He said that no one from [facility] has reached out to him regarding his Infusions yet.     Will call them and update patient and mother.    Spoke to [facility] who stated that insurance is not accepted. Will need to send to another facility.

## 2018-07-02 NOTE — TELEPHONE ENCOUNTER
----- Message from Elizabeth A Bosworth, LPN sent at 6/25/2018  5:20 PM CDT -----  Contact: self/[phone]      ----- Message -----  From: Tracey Callaway MA  Sent: 6/25/2018   3:18 PM  To: Farrah Zavaleta Staff    Pt's mom is calling to find out where the pt's infusion will be given. Please advise.    Thanks

## 2018-07-05 NOTE — TELEPHONE ENCOUNTER
Patient notified that Lachelle has submitted all information to [facility] to receive home Privigen infusions.

## 2018-07-05 NOTE — TELEPHONE ENCOUNTER
Can you print out Privigen rx so [facility] can [facility]

## 2018-07-05 NOTE — TELEPHONE ENCOUNTER
----- Message from Racheal Clarke sent at 7/5/2018 12:36 PM CDT -----  Contact: patient [phone]  Patient is requesting a call back from the office in regards to his infusion.  Where should he go.    Please call and advise, thank you.

## 2018-07-10 NOTE — TELEPHONE ENCOUNTER
----- Message from Elizabeth A Bosworth, LPN sent at 7/9/2018  4:28 PM CDT -----  Contact: [phone]       ----- Message -----  From: Racheal Clarke  Sent: 7/9/2018   3:06 PM  To: Farrah Zavaleta Staff     [facility] requesting a call from the office to go over information and need recent clinic notes . Please advise, Thanks  '    Fax number [phone]

## 2018-07-10 NOTE — TELEPHONE ENCOUNTER
Spoke with Sarah at [facility] . Faxed all clinicals, labs and demographics to [phone].  Sarah stated that she will let Dee Dee(patient rep) know.

## 2018-07-10 NOTE — TELEPHONE ENCOUNTER
----- Message from Tracey Callaway MA sent at 7/10/2018 10:41 AM CDT -----  Contact: [facility]/[phone]  Pharmacy would regarding the new referral for the pt's Immune Globulin G, IGG,-PRO-IGA 10 % injection, Privigen, 10 % Soln. They would like to go someo f the pt's clionical information. Please advise.    Thanks

## 2018-07-17 PROBLEM — R60.0 EDEMA, LOWER EXTREMITY: Status: ACTIVE | Noted: 2018-01-01

## 2018-07-18 NOTE — PROGRESS NOTES
Subjective:       Patient ID: Patrick Dennis is a 58 y.o. male.    Chief Complaint: Interstitial Lung Disease and Shortness of Breath    HPI Mr. Dennis is a 58-year-old nonsmoker who comes for an interval assessment of   interstitial lung disease and chronic respiratory failure.  He has now completed   approximately 6 weeks therapy with OFEV.  He has had occasional loose stools,   but no sustained diarrhea.  He also continues to take CellCept.  He remains   short of breath with very modest activities.  He does feel that his cough is   less troublesome now than in the past.  He notes that his oximetry is quite   labile.  He has resting values in the low 90s with 3 liters of nasal oxygen.    With activities, his saturations decline rapidly into the 70s despite the use of   supplemental oxygen.  He also notes a burning discomfort in his chest or back.    He has not had any wheezing.  He is not having any purulent sputum.    Mr. Dennis notes that he has had a 6-pound increase in his weight over the past   2 days.  He has also had intermittent swelling in his lower extremities.  He has not   made any alterations in his diet.  He has not had any leg pain.  He is not   currently taking any diuretics.    DATA:  Laboratory studies done today include a basic metabolic profile.  There   is a mild elevation in the glucose and the bicarbonate.  Otherwise, values are   within the range of normal.  Also, a hepatic panel was done today.  This does   not show any abnormal findings with regard to liver enzymes..        CB/IN  dd: 07/17/2018 20:30:33 (CDT)  td: 07/18/2018 00:23:52 (CDT)  Doc ID   #[id number]  Job ID #[id number]    CC:       Review of Systems   Constitutional: Positive for fatigue. Negative for fever.   HENT: Negative for postnasal drip, sinus pressure, voice change and congestion.    Respiratory: Positive for shortness of breath and dyspnea on extertion. Negative for cough, sputum production and wheezing.     Cardiovascular: Positive for leg swelling. Negative for chest pain.   Genitourinary: Negative for difficulty urinating.   Musculoskeletal: Positive for back pain. Negative for arthralgias.   Skin: Negative for rash.   Gastrointestinal: Negative for abdominal pain and acid reflux.   Neurological: Negative for dizziness and weakness.   Hematological: Negative for adenopathy.       Objective:      Physical Exam   Constitutional: He is oriented to person, place, and time. He appears well-developed. He is obese.   HENT:   Head: Normocephalic.   Mouth/Throat: Oropharynx is clear and moist. No oropharyngeal exudate.   Neck: Normal range of motion. Neck supple. No JVD present. No tracheal deviation present. No thyromegaly present.   Cardiovascular: Regular rhythm and normal heart sounds.    No murmur heard.  Regular Tachycardia.   Pulmonary/Chest: Symmetric chest wall expansion. No stridor. He has no wheezes. He has no rhonchi. He has rales (bibasilar rales). He exhibits no tenderness.   Abdominal: Soft.   Musculoskeletal: He exhibits edema (mild LE edema). He exhibits no tenderness (no calf tenderness).   Lymphadenopathy:     He has no cervical adenopathy.   Neurological: He is alert and oriented to person, place, and time.   Skin: Skin is warm and dry. No rash noted. No erythema. Nails show no clubbing.   Psychiatric: He has a normal mood and affect.   Vitals reviewed.    Personal Diagnostic Review    No flowsheet data found.      Assessment:       1. Anti-polysaccharide antibody deficiency    2. Chronic respiratory failure with hypoxia    3. ILD (interstitial lung disease)    4. Morbid obesity due to excess calories    5. Edema, lower extremity        Outpatient Encounter Prescriptions as of 7/17/2018   Medication Sig Dispense Refill    azelaic acid (FINACEA) 15 % Foam Use hs on face 50 g 3    clindamycin (CLEOCIN T) 1 % lotion Use hs on face 60 mL 3    doxycycline (VIBRAMYCIN) 100 MG Cap Take 1 capsule (100 mg  total) by mouth 2 (two) times daily. 20 capsule 0    furosemide (LASIX) 20 MG tablet Take 1 tablet (20 mg total) by mouth daily as needed (to control weight and edema). 30 tablet 3    Immune Globulin G, IGG,-PRO-IGA 10 % injection, Privigen, 10 % Soln Inject 750 mLs (75 g total) into the vein every 28 days. 750 mL 11    mycophenolate (CELLCEPT) 500 mg Tab Take 500 mg by mouth 2 (two) times daily.      nintedanib (OFEV) 150 mg Cap Take 150 mg by mouth 2 (two) times daily. 60 capsule 6     No facility-administered encounter medications on file as of 7/17/2018.      Orders Placed This Encounter   Procedures    X-Ray Chest PA And Lateral     Standing Status:   Future     Standing Expiration Date:   11/17/2018    Hepatic function panel     Standing Status:   Future     Standing Expiration Date:   11/17/2018     Plan:     Begin Lasix 20 mg daily x 3, then daily as needed to control weight/edema.  Stop Cellcept.  Continue OFEV.  Continue O2 at 3-5 LPM to achieve O2Hb 88-90%.  Discussed the early role for antibiotics for treatment of respiratory infection.  Return visit 2 months with CXR and hepatic panel.

## 2018-07-18 NOTE — PATIENT INSTRUCTIONS
Begin Lasix 20 mg daily x 3, then daily as needed to control weight/edema.  Stop Cellcept.  Continue OFEV.  Continue O2 at 3-5 LPM to achieve O2Hb 88-90%.  Discussed the early role for antibiotics for treatment of respiratory infection.  Return visit 2 months with CXR and hepatic panel.

## 2018-07-19 NOTE — TELEPHONE ENCOUNTER
Received a fax from [facility] asking Dr. Yan to sign rx.     Privigen rx signed and faxed back    Form placed in folder.

## 2018-07-23 NOTE — TELEPHONE ENCOUNTER
----- Message from Bry Vigil sent at 7/23/2018 10:16 AM CDT -----  Contact: Swathi   Needs Advice    Reason for call:     Would like a call regarding medication lasix patient was prescribed.   Communication Preference: [phone]   Additional Information:

## 2018-07-25 NOTE — TELEPHONE ENCOUNTER
Pt called on weekend to report increase in weight despite beginning Lasix.  He was advised to increase dose to 2 tabs daily x3 days.  After this his weight is down.  He will continue 20-40 mg daily as needed to control weight.  He will also begin K suppl daily with the Lasix.

## 2018-07-31 NOTE — TELEPHONE ENCOUNTER
Received page from [facility]  to call back, called back and was connected to Mr. Dennis's home health nurse, Mikaela. She indicated that she was concerned about continuing his Privigen infusion, he had received 45 g and then had a coughing fit with pulse ox saturations in the 60s that then came back up to the 80s. The patient at the time of call was acting in his usually state of health, he was noted to have normal blood pressure, be mildly tachycardic, but denies n/v/d/chills/sob/rash/lightheadedness. Mikaela asked if this may be an indication to give Epi. In discussion with his primary immunologist Dr. Nicholas we felt that if patient was feeling in his usual state of health they could stop the infusion now and monitor the patient, should any other system be involved we would consider giving Epi and gave the nurse instructions on how and when to use this treatment modality. She asked if the dose could be split into two days, his primary immunologist agreed to this plan and the [employer] company will send over an RX for this in the next few days for Dr. Nicholas to sign. Should the patient's status change or deteriorate in any way we asked the home health nurse to seek medical care for the patient by calling an ambulance to transport patient to nearest emergency facility.     Rush Whitmore, DO  Allergy/Immunology

## 2018-08-08 NOTE — TELEPHONE ENCOUNTER
----- Message from Caryn Peralta sent at 8/8/2018 10:38 AM CDT -----  Contact: Swathi  [phone]  Needs Advice    Reason for call:    Establish care with another physician for the pt   Communication Preference:  Phone   Additional Information:

## 2018-08-08 NOTE — TELEPHONE ENCOUNTER
Spoke with Patient's mom. He needs an O2 machine that will deliver more than 5 liters. Patient also needs a face mask for mouth breathers. Stated she spoke with Ann[employer]ie last week but has heard nothing. I don't see any documentation, but we have received forms from [employer].

## 2018-08-08 NOTE — TELEPHONE ENCOUNTER
Dr. Solorio, please call patient's mother. Offered an appointment with Dr. Oliver to get established in September. Refused. She states Annmarie promised him a machine that could deliver up to 10 liters of oxygen.Just couldn't make her happy.

## 2018-08-08 NOTE — TELEPHONE ENCOUNTER
----- Message from Marti Silverman sent at 8/8/2018  9:59 AM CDT -----  Contact: Yong / Mother [phone]  Yong is requesting a call in regards to paperwork for medicare and mask.

## 2018-08-10 NOTE — TELEPHONE ENCOUNTER
----- Message from Tracey Callaway MA sent at 8/10/2018 10:39 AM CDT -----  Contact: Swathi/[phone]  Pt needs to speak with someone regarding his Previgen infusion. Please advise.    Thanks

## 2018-08-10 NOTE — TELEPHONE ENCOUNTER
Mom is calling to clarify with Dr. Yan about Patrick's infusion. Mom wants to know if dose is going to be split between 2 days or 1/2 dose week one and 1/2 dose week two. Please advise.

## 2018-08-14 NOTE — TELEPHONE ENCOUNTER
----- Message from Tracey Callaway MA sent at 8/13/2018  2:06 PM CDT -----  [phone],2  Pharmacy stated that this is there second request for an update on the pt's rx for Immune Globulin G, IGG,-PRO-IGA 10 % injection, Privigen, 10 % Soln. Please advise.    Thanks

## 2018-08-21 NOTE — PATIENT INSTRUCTIONS
CMP and CXR (phone report).  Increase Lasix dose to 60-80 mg daily to control LE edema.  Increase potassium suppl to twice daily.  ENT consult regarding nasal/sinus congestion.  Begin trial with Albuterol aerosol treatments.  Increase home O2 to 6-8 LPM if possible.  Return visit here scheduled for 9/6/18.

## 2018-08-21 NOTE — PROGRESS NOTES
Subjective:       Patient ID: Patrick Dennis is a 58 y.o. male.    Chief Complaint: Shortness of Breath and Interstitial Lung Disease    HPI HISTORY OF PRESENT ILLNESS:  Mr. Dennis is a 58-year-old nonsmoker, who returns   for an interval assessment of advanced interstitial lung disease and chronic   respiratory failure.  He reports increasing difficulty with shortness of breath   with daily activities.  He also is having lower extremity edema despite the use   of a modest dose of Lasix.  He has a daily cough with a small amount of   nonpurulent sputum.  He also has nasal and sinus congestion and feels that these   are aggravating factors in his respiratory distress.  He is not having any   associated symptoms to suggest sinusitis.  He has not had any thoracic pain or   hemoptysis.  He has not had any regular upper GI symptoms.    Mr. Dennis discontinued CellCept following his visit here last month.  He does   remain on OFEV, and has tolerated this medication fairly well.  He is currently   using oxygen at 5 liters per minute.  He has resting oximetry values at home in the   low 90s.  He has very labile oximetry readings with simple activities.  He has   had declines into the 80s and 70s despite the use of nasal oxygen.    Mr. Dennis is currently receiving immunoglobulin infusions for treatment of a   polysaccharide antibody deficiency.  He is interested in having a trial with   albuterol aerosol treatments.  He has received aerosol treatments on a few   occasions during the past several months and feels that these may provide a   limited temporary beneficial effect.  His spirometry studies here have not shown   any pattern of obstruction.      CB/HN  dd: 08/20/2018 21:59:03 (CDT)  td: 08/21/2018 11:21:03 (CDT)  Doc ID   #[id number]  Job ID #[id number]    CC:       Review of Systems   Constitutional: Positive for fatigue. Negative for fever.   HENT: Positive for postnasal drip and congestion. Negative for sinus  pressure and voice change.    Respiratory: Positive for cough, shortness of breath and dyspnea on extertion. Negative for sputum production and wheezing.    Cardiovascular: Positive for leg swelling. Negative for chest pain.   Genitourinary: Negative for difficulty urinating.   Musculoskeletal: Negative for arthralgias and back pain.   Skin: Negative for rash.   Gastrointestinal: Negative for abdominal pain and acid reflux.   Neurological: Negative for dizziness and weakness.   Hematological: Negative for adenopathy.       Objective:      Physical Exam   Constitutional: He is oriented to person, place, and time. He appears well-developed. He is obese.   HENT:   Head: Normocephalic.   Mouth/Throat: Oropharynx is clear and moist. No oropharyngeal exudate.   Neck: Normal range of motion. Neck supple. No JVD present. No tracheal deviation present. No thyromegaly present.   Cardiovascular: Normal rate, regular rhythm and normal heart sounds.   No murmur heard.  Pulmonary/Chest: Symmetric chest wall expansion. No stridor. He has no wheezes. He has no rhonchi. He has rales (few insp squeaks and rales posteriorly). He exhibits no tenderness.   Abdominal: Soft.   Musculoskeletal: He exhibits edema (moderate LE edema without erythema or calf tenderness).   Lymphadenopathy:     He has no cervical adenopathy.   Neurological: He is alert and oriented to person, place, and time.   Skin: Skin is warm and dry. No rash noted. No erythema. Nails show no clubbing.   Psychiatric: He has a normal mood and affect.   Vitals reviewed.    Personal Diagnostic Review    No flowsheet data found.      Assessment:       1. Chronic respiratory failure with hypoxia    2. Edema, lower extremity    3. ILD (interstitial lung disease)    4. Morbid obesity due to excess calories    5. Anti-polysaccharide antibody deficiency    6. Chronic rhinitis        Outpatient Encounter Medications as of 8/20/2018   Medication Sig Dispense Refill    albuterol  "(PROVENTIL) 2.5 mg /3 mL (0.083 %) nebulizer solution Take 3 mLs (2.5 mg total) by nebulization every 6 (six) hours as needed for Shortness of Breath. 180 mL 6    azelaic acid (FINACEA) 15 % Foam Use hs on face 50 g 3    clindamycin (CLEOCIN T) 1 % lotion Use hs on face 60 mL 3    doxycycline (VIBRAMYCIN) 100 MG Cap Take 1 capsule (100 mg total) by mouth 2 (two) times daily. 20 capsule 0    furosemide (LASIX) 20 MG tablet Take 3-4 tablets (60-80 mg total) by mouth once daily. 100 tablet 3    Immune Globulin G, IGG,-PRO-IGA 10 % injection, Privigen, 10 % Soln Inject 750 mLs (75 g total) into the vein every 28 days. 750 mL 11    mycophenolate (CELLCEPT) 500 mg Tab Take 500 mg by mouth 2 (two) times daily.      nintedanib (OFEV) 150 mg Cap Take 150 mg by mouth 2 (two) times daily. 60 capsule 6    potassium chloride SA (K-DUR,KLOR-CON) 10 MEQ tablet Take 1 tablet (10 mEq total) by mouth 2 (two) times daily. 60 tablet 3    [DISCONTINUED] furosemide (LASIX) 20 MG tablet Take 1 tablet (20 mg total) by mouth daily as needed (to control weight and edema). 30 tablet 3    [DISCONTINUED] potassium chloride SA (K-DUR,KLOR-CON) 10 MEQ tablet Take 1 tablet (10 mEq total) by mouth once daily. 30 tablet 3     No facility-administered encounter medications on file as of 8/20/2018.      Orders Placed This Encounter   Procedures    NEBULIZER FOR HOME USE     Order Specific Question:   Height:     Answer:   5' 10" (1.778 m)     Order Specific Question:   Weight:     Answer:   123 kg (271 lb 2.7 oz)     Order Specific Question:   Length of need (1-99 months):     Answer:   99    Comprehensive metabolic panel     Standing Status:   Future     Number of Occurrences:   1     Standing Expiration Date:   8/20/2019    Ambulatory consult to ENT     Referral Priority:   Routine     Referral Type:   Consultation     Referral Reason:   Specialty Services Required     Requested Specialty:   Otolaryngology     Number of Visits " Requested:   1     Plan:     CMP and CXR (phone report).  Increase Lasix dose to 60-80 mg daily to control LE edema.  Increase potassium suppl to twice daily.  ENT consult regarding nasal/sinus congestion.  Begin trial with Albuterol aerosol treatments.  Increase home O2 to 6-8 LPM if possible.  Return visit here scheduled for 9/6/18.    NOTE:  70 min visit with >50% time counseling regarding management of ILD and respiratory failure.

## 2018-09-06 NOTE — PROGRESS NOTES
Consent obtained. 22 g sl started in right forearm for optison use. optison given ivp via sl for imaging. Denies transfusion rxn. Tolerated well. Sl d/reymundo after. Pressure applied.

## 2018-09-06 NOTE — PROGRESS NOTES
"History & Physical  [facility]        SUBJECTIVE:     History of Present Illness:  Patrick Dennis is a 58 y.o. male who presented for evaluation of interstitial lung disease. He is a lifetime non-smoker. He developed respiratory symptoms if chronic cough & dyspnea in 2016. He saw a pulmonologist, Dr Burgos in [place]. 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 He took prednisone for several months & left his job; his pulmonary symptoms did not improve.    He had connective tissue disease serology done including JOSH, ANCA, SCL-70, SSA, & SSB which were all negative. I do not see RF antibody on file anywhere.    HP serology testing was done with here with [facility] & was essentially negative. He is receiving IVIG infusion therapy with [facility] (essentially "expanded use" to try to improve his ILD).    He came to [facility] & saw Dr Solorio ~6 times over the past year. He was treated with cellcept for several months. This did not have any measurable favorable impact on his disease progression, & it was stopped in July. He has never had a lung biopsy. He has been treated with Ofev since May 2018. Dr Solorio has retired his outpatient practice, thus I am evaluating Mr Dennis today.    He has spoken to the lung transplant team one time before on the telephone (May 2018). At that time his weight was not in the acceptable range to be considered for transplant (goal BMI less than 34), so clinic evaluation was deferred. He has since lost weight from 300 pounds 1/2018 to 269 pounds today.    His most recent complaint is of bilateral lower extremity edema. He was prescribed lasix, & this was titrated up. His edema has improved significantly but is not resolved. He continues to experience severe dyspnea with exertion associated with oxygen desaturation. At rest he wears supplemental oxygen 5LPM & SaO2 is at goal at home & in clinic " "today.      Review of patient's allergies indicates:  No Known Allergies    Past Medical History:   Diagnosis Date    Cough      No past surgical history on file.  Family History   Problem Relation Age of Onset    No Known Problems Mother     No Known Problems Father     Asthma Other     Emphysema Neg Hx        Review of Systems:  CV: no syncope  ENT: no sore throat  Resp: per hpi  Eyes: no visual changes  Gastrointestinal: no nausea or vomiting  Integument/Breast: no rash  Musculoskeletal: no arthralgias  Neurological: no headaches  Behavioral/Psych: no confusion or depression  Heme: no bleeding      OBJECTIVE:     Vital Signs  Vitals:    09/06/18 1429 09/06/18 1457   BP: 94/64    Pulse: 102 100   SpO2: (!) 78%  Comment: on 6 liters (!) 94%   Weight: 122 kg (269 lb)    Height: 5' 10" (1.778 m)      BMI 38    Physical Exam:  General: no distress  Eyes:  conjunctivae/corneas clear  Nose: no discharge  Neck: no jugular venous distention  Lungs:  Tachypnic, no wheezes, + diffuse rales  Heart: regular rate and rhythm and no murmur  Abdomen: non-distended, non-tender  Extremities: no cyanosis or clubbing. + bilateral pedal edema  Skin: No rashes or lesions. good skin turgor  Neurologic: alert, oriented, thought content appropriate    Laboratory:  All recent laboratory tests reviewed    Chest Imaging, My Impression:   Diffuse interstitial disease with ground glass infiltrates    Diagnostic Results:  Echo: Reviewed  PFT: Reviewed, severe restriction    ASSESSMENT/PLAN:     1) Interstitial Lung Disease. Possibly IPF though his radiographic pattern is not typical of UIP, & he has not had a lung biopsy. I would not recommend a lung biopsy now given the advanced nature of his lung disease. Continue Ofev. He has successfully lost 30 pounds since he was advised to lose weight for consideration for lung transplant. He is not at his weight goal yet, but he is approaching his goal. Will ask lung transplant clinic if they could " evaluate him at this time. Check RF serology for completeness.  2) Pedal edema. Concerning for right heart failure. Obtain echo with doppler now; I will call him with the results. Continue lasix. Check metabolic panel.  3) Chronic hypoxemic respiratory failure. Continue supplemental oxygen.    I provided 30 minutes of direct patient education & counseling. I answered all questions that Mr Dennis & his mother shared.    Alexis Oliver MD  [facility]

## 2018-09-10 NOTE — TELEPHONE ENCOUNTER
Pt is ready to schedule consult.  His mother believes he will have appropriate oxygen delivery soon.  Offered to schedule 9/17, he accepts.  Discussed need for DLCO with Dr. Monique. Pt is requiring 6-8L at rest.  Dr. Monique does not want DLCO scheduled as patient would not be able to tolerate being off of oxygen for 20 minutes.  Scheduling card placed on 's desk.    ----- Message from Mikki Lezama sent at 9/10/2018 11:12 AM CDT -----  Contact: Pt mother-Mable Dennis   Message for Coordinator    Who called: Pt's mother  Message: Calling to speak with Jade in regards to scheduling an appt for the pt, she stated he's having lung problems.   Contact preferences: [phone] or [phone]  Additional Information: N/A

## 2018-09-10 NOTE — LETTER
09/11/2018    Alexis Oliver  [address] [facility]  [address] [facility] [address]  Phone: [phone]  Fax: [phone]         Dear Dr. Alexis Oliver    Patient: Patrick Dennis     MR Number: [id number]     YOB: 1960       Thank you for the referral of Patrick Dennis to our lung transplant program. An initial appointment with the transplant team has been scheduled for 9/17/18. You will receive an after-visit summary following the completion of your patients appointment in our clinic.    Thank you again for your trust in our program. If there is anything we can do for you or your staff, please feel free to contact us at [phone].    Sincerely,         Lewis Monique MD   Director, Lung Transplantation   Pulmonary & Critical Care Medicine    [facility]  [address] [place] [id number]  [phone]

## 2018-09-10 NOTE — TELEPHONE ENCOUNTER
Spoke with a rep at [facility] [employer]. Advised patient should make appointment and then call [facility] to arrange for delivery of M-60 tank for travel. Advised will need to carry the E tank for use out of car. Patient's mom verbalized understanding.

## 2018-09-11 NOTE — TELEPHONE ENCOUNTER
----- Message from Alexis Oliver MD sent at 2018  9:14 AM CDT -----  Contact: Pt   [phone]  Could you please assist with the followin) Pt needs a CTA scheduled.  2) Pt needs an appt with bariatric medicine scheduled.  3) I am referring him to  pulmonary rehab. I have the paper order & will drop it off. Can you fax it to them?    ThanksAlexis    ----- Message -----  From: Yelena Fowler LPN  Sent: 9/10/2018  11:57 AM  To: Alexis Oliver MD        ----- Message -----  From: Caryn Peralta  Sent: 9/10/2018  11:52 AM  To: Melody Espinoza Staff    Needs Advice    Reason for call:    Referral    Communication Preference:  Phone   Additional Information:  Transplant  Team wants to refer the pt to pulmonary rehab,  Call back to verify

## 2018-09-11 NOTE — TELEPHONE ENCOUNTER
Spoke with Mr Dennis. He has gained 5 pounds since I saw him in clinic last week. This despite 80mg lasix daily which he has been compliant with. He has not been following a sodium or fluid restriction; recommended he adhere to low salt diet with 2L per 24 hour fluid restriction. Advised that if fluid gain continues despite this, he will need to come into hospital for IV lasix.    Additionally:  1) Will schedule for CTA given new finding of pulmonary hypertension.  2) Referral to pulmonary rehab at .  3) Referral to bariatric medicine clinic.    Alexis Oliver MD

## 2018-09-11 NOTE — TELEPHONE ENCOUNTER
Spoke with Jose in bariatrics. She will call to schedule the patient. Patient wants to wait until he receives his O2 to schedule the CTA. They will call when ready to scheduled. Advised [place] [facility] will contact once authorization received from ins carrier. Patient and his mother are aware and in agreement with plan.

## 2018-09-13 NOTE — TELEPHONE ENCOUNTER
----- Message from Marti Silverman sent at 9/13/2018  1:22 PM CDT -----  Contact: Swathi / Wife [phone]  PT has oxygen now. He is available for a CT scan.

## 2018-09-13 NOTE — TELEPHONE ENCOUNTER
Called patient to schedule his consult appointment from his referral. Patient had me speak with his mother. Patients mother would like for him to wait until after his appointment with the transplant team to make an appointment with us.

## 2018-09-17 PROBLEM — I27.20 PULMONARY HYPERTENSION, UNSPECIFIED: Status: ACTIVE | Noted: 2018-01-01

## 2018-09-17 PROBLEM — I50.810 RIGHT VENTRICULAR FAILURE: Status: ACTIVE | Noted: 2018-01-01

## 2018-09-17 NOTE — PROCEDURES
Patrick Dennis is a 58 y.o.  male patient, who presents for a 6 minute walk test ordered by Tita MAGAÑA  The diagnosis is Pre Lung Transplant Evaluation.  The patient's BMI is 37.9 kg/m2.  Predicted distance (lower limit of normal) is 378.8 meters.      Test Results:    The test was not completed.  The patient stopped 1 times for a total of 310 seconds.  The total time walked was 50 seconds.  During walking, the patient reported:  Dyspnea, Lightheadedness. The patient used a wheelchair and supplemental oxygen during testing.     09/17/2018---------Distance: 15.24 meters (50 feet)     O2 Sat % Supplemental Oxygen Heart Rate Blood Pressure Adina Scale   Pre-exercise  (Resting) 90 % Other (see comments)(8L N/C) 102 bpm 114/75 mmHg 7-8   During Exercise 72 % Other (see comments)(8L N/C) 109 bpm 96/64 mmHg 10   Post-exercise  (Recovery) 85 % Other (see comments)(8L N/C)  108 bpm   mmHg       Recovery Time: 478 seconds    Performing nurse/tech: Elijah VELA      PREVIOUS STUDY:   The patient had a previous study.  07/28/2017---------Distance: 426.72 meters (1400 feet)       O2 Sat % Supplemental Oxygen Heart Rate Blood Pressure Adina Scale   Pre-exercise  (Resting) 92 % Room Air 98 bpm 144/87 mmHg 0.5   During Exercise 68 % Room Air 142 bpm 222/100 mmHg 7-8   Post-exercise    94 % Room Air  110 bpm 175/89 mmHg          CLINICAL INTERPRETATION:  Six minute walk distance is 15.24 meters (50 feet) with maximal severe dyspnea.  During exercise, there was significant desaturation while breathing supplemental oxygen.  Both blood pressure and heart rate remained stable with walking.  Tachycardia was present prior to exercise.  The patient reported non-pulmonary symptoms during exercise.  Severe exercise impairment is likely due to desaturation.  The patient did not complete the study, walking 50 seconds of the 360 second test.  The patient may benefit from lung transplant evaluation.  Since the previous study in  July 2017, exercise capacity is significantly worse.  Based upon age and body mass index, exercise capacity is less than predicted.

## 2018-09-17 NOTE — LETTER
September 18, 2018        Alexis Oliver  [address]  [address]  [facility] [address]  Phone: [phone]  Fax: [phone]             [facility]  [address]  [facility] [address]  Phone: [phone]   Patient: Patrick Dennis   MR Number: [id number]   YOB: 1960   Date of Visit: 9/17/2018       Dear Dr. Alexis Oliver    Thank you for referring Patrick Dennis to me for evaluation. Attached you will find relevant portions of my assessment and plan of care.    If you have questions, please do not hesitate to call me. I look forward to following Patrick Dennis along with you.    Sincerely,    Lewis Monique MD    Enclosure    If you would like to receive this communication electronically, please contact [email or web address] or [phone] to request [employer] access.    [employer] is a tool which provides read-only access to select patient information with whom you have a relationship. Its easy to use and provides real time access to review your patients record including encounter summaries, notes, results, and demographic information.    If you feel you have received this communication in error or would no longer like to receive these types of communications, please e-mail [email or web address]

## 2018-09-17 NOTE — PROGRESS NOTES
LUNG TRANSPLANT INITIAL EVALUATION                                                                                                                                             Reason for Visit:  Evaluation for lung transplant    Referring Physician: Alexis Oliver MD    History of Present Illness: Patrick Dennis is a 58 y.o. male who is on 5L of oxygen.  He is on no assisted ventilation.  His New York Heart Association Class is III and a Karnofsky score of 60% - Requires occasional assistance but is able to care for needs. He is not diabetic.     Mr. Dennis states that his pulmonary complaints began about 3 years ago after an episode of influenza B. Feels that cough and mild SMITH continued well after the initial illness, which eventually prompted him to receive a CXR in 2016 by his primary care physician. Found to be abnormal, he was sent for further evaluation by Dr. Burgos (pulmonary) in [place], [place]. CT imaging at that time was consistent with an interstitial lung disease. 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 Allergen testing in addition to a basic rheumatologist work up including JOSH, ANCA, SCL-70, SSA and SSB were all unremarkable. Of note, Mr Dennis nted no change in his symptoms whether he was at home or at work and did not notice any significant change when he left town for several days at a time for work or vacation. He has since stopped working for the [employer] and has noticed no improvement in his symptoms. He has never undergone a lung biopsy.     In regard to therapies, he was initially started on prednisone in [place] with essentially no change in disease course. He was later seen by Dr. Solorio in the [facility] and was started on Cellcept about a year ago for several months (stopped in July). This  too had little impact on his disease trajectory. He was placed on Ofev in May of 2018 and may have noticed some decrease in his cough, however overall no significant change in his dyspnea or progression. He has also been following with an allergist, who had recommend a trial of IVIG infusions -- although it is not clear these have improved or delayed his progress, he does report feeling better typically a few days after receiving the infusions.     He is now under the care of Dr. Alexis Oliver who has assumed care from Dr Solorio, who has recently retired his outpatient practice. He was recently seen in Dr Oliver's clinic and his most recent complaint is of bilateral lower extremity edema. He was prescribed lasix and this has gradually been titrated upward. An echocardiogram was ordered and has demonstrated significant pulmonary hypertension, bi atrial enlargement and severe RV enlargement and dysfunction.     Past Medical History:   Diagnosis Date    Cough     IPF (idiopathic pulmonary fibrosis)      Colonoscopy Results: No procedure found.    History reviewed. No pertinent surgical history.     Traumas: none    Blood Product Transfusions: no    Allergies: Patient has no known allergies.    Current Outpatient Medications   Medication Sig    acetaminophen (TYLENOL) 325 MG tablet Take 650 mg by mouth every 6 (six) hours as needed for Pain.    albuterol (PROVENTIL) 2.5 mg /3 mL (0.083 %) nebulizer solution Take 3 mLs (2.5 mg total) by nebulization every 6 (six) hours as needed for Shortness of Breath.    diphenhydrAMINE (BENADRYL) 25 mg capsule Take 25 mg by mouth every 6 (six) hours as needed for Allergies.    fexofenadine (ALLEGRA) 180 MG tablet Take 180 mg by mouth once daily.    furosemide (LASIX) 20 MG tablet Take 3-4 tablets (60-80 mg total) by mouth once daily. (Patient taking differently: Take 80 mg by mouth once daily. )    guaiFENesin (HUMIBID E) 400 mg Tab Take 400 mg by mouth 2 (two) times daily.     Immune Globulin G, IGG,-PRO-IGA 10 % injection, Privigen, 10 % Soln Inject 750 mLs (75 g total) into the vein every 28 days.    nintedanib (OFEV) 150 mg Cap Take 150 mg by mouth 2 (two) times daily.    potassium chloride SA (K-DUR,KLOR-CON) 10 MEQ tablet Take 1 tablet (10 mEq total) by mouth 2 (two) times daily.     No current facility-administered medications for this visit.        Immunization History   Administered Date(s) Administered    Pneumococcal Conjugate - 13 Valent 12/11/2017    Pneumococcal Polysaccharide - 23 Valent 10/02/2017     Family History:    Family History   Problem Relation Age of Onset    No Known Problems Mother     No Known Problems Father     Asthma Other     Lung disease Paternal Uncle     Emphysema Neg Hx      Social History     Substance and Sexual Activity   Alcohol Use No      Social History     Substance and Sexual Activity   Drug Use No      Social History     Socioeconomic History    Marital status: Single     Spouse name: Not on file    Number of children: Not on file    Years of education: Not on file    Highest education level: Not on file   Social Needs    Financial resource strain: Not on file    Food insecurity - worry: Not on file    Food insecurity - inability: Not on file    Transportation needs - medical: Not on file    Transportation needs - non-medical: Not on file   Occupational History    Not on file   Tobacco Use    Smoking status: Never Smoker    Smokeless tobacco: Never Used   Substance and Sexual Activity    Alcohol use: No    Drug use: No    Sexual activity: Not on file   Other Topics Concern    Not on file   Social History Narrative    Not on file     Review of Systems   Constitutional: Positive for weight loss. Negative for chills and fever.        Actively attempting to lose weight both through diuresis and caloric restriction    HENT: Negative for congestion and sore throat.    Eyes: Negative.    Respiratory: Positive for cough and  "shortness of breath. Negative for hemoptysis, sputum production and wheezing.    Cardiovascular: Positive for leg swelling. Negative for chest pain and palpitations.   Gastrointestinal: Positive for diarrhea. Negative for abdominal pain, nausea and vomiting.        Occasional loose stools since starting OFEV   Genitourinary: Negative.    Musculoskeletal: Negative.    Skin: Negative for itching and rash.        No changes to skin or nails   All other systems reviewed and are negative.    Vitals  /61   Pulse 101   Temp 97.1 °F (36.2 °C) (Oral)   Resp 20   Ht 5' 10" (1.778 m)   Wt 119.3 kg (263 lb)   SpO2 (!) 94% Comment: 6 liters  BMI 37.74 kg/m²   Physical Exam   Constitutional: He is oriented to person, place, and time and well-developed, well-nourished, and in no distress. Vital signs are normal. He appears not jaundiced. He appears unhealthy.  Non-toxic appearance. He does not have a sickly appearance.   HENT:   Mouth/Throat: Oropharynx is clear and moist.   Eyes: Conjunctivae are normal. No scleral icterus.   Neck: Neck supple. JVD present.   Cardiovascular: Normal rate and regular rhythm.   Murmur heard.   Systolic murmur is present with a grade of 2/6.  Loud S2   Pulmonary/Chest: Effort normal.   Decreased bilaterally ith bibasilar rales R> L   Abdominal: Soft.   Musculoskeletal: He exhibits edema.   2+ pitting edema bilaterally.    Lymphadenopathy:     He has no cervical adenopathy.   Neurological: He is alert and oriented to person, place, and time.   Skin: Skin is warm and dry. No rash noted. There is erythema.   Mild erythema noted to both feet   Psychiatric: Mood, memory, affect and judgment normal.   Vitals reviewed.      Labs:    Lab Results   Component Value Date    WBC 6.16 09/06/2018    HGB 14.5 09/06/2018    HCT 48.3 09/06/2018    MCV 93 09/06/2018     09/06/2018     BMP  Lab Results   Component Value Date     09/06/2018    K 5.0 09/06/2018    CL 90 (L) 09/06/2018    CO2 38 " (H) 09/06/2018    BUN 11 09/06/2018    CREATININE 0.8 09/06/2018    CALCIUM 9.6 09/06/2018    ANIONGAP 10 09/06/2018    ESTGFRAFRICA >60.0 09/06/2018    EGFRNONAA >60.0 09/06/2018         6MW 9/17/2018 7/28/2017   6MWT Status not completed completed without stopping   Patient Reported Dyspnea;Lightheadedness Dyspnea   Was O2 used? Yes No   Delivery Method Cannula;Pull Tank;Continuous Flow -   6MW Distance walked (feet) 50 1400   Distance walked (meters) 15.24 426.72   Did patient stop? Yes No   Oxygen Saturation 90 92   Supplemental Oxygen Other (see comments) Room Air   Heart Rate 102 98   Blood Pressure 114/75 144/87   Adina Dyspnea Rating  very heavy very, very light (just noticeable)   Oxygen Saturation 72 68   Supplemental Oxygen Other (see comments) Room Air   Heart Rate 109 142   Blood Pressure 96/64 222/110   Adina Dyspnea Rating  severe/maximal very heavy   Recovery Time (seconds) 478 228   Oxygen Saturation 85 94   Supplemental Oxygen Other (see comments) Room Air   Heart Rate 108 110     Imaging:  Results for orders placed during the hospital encounter of 08/20/18   X-Ray Chest PA And Lateral    Narrative EXAMINATION:  XR CHEST PA AND LATERAL    CLINICAL HISTORY:  Interstitial pulmonary disease, unspecified    TECHNIQUE:  PA and lateral views of the chest were performed.    COMPARISON:  CT chest on 04/17/2018    FINDINGS:  The mediastinal structures are midline.  Cardiac size is not enlarged.    Bilateral patchy interstitial opacities more prominent at the bases with bilateral lung volume loss.  There is no significant pneumothorax or pleural effusion.    The bony structures show degenerative change.      Impression Bilateral pulmonary changes of chronic interstitial lung disease.    Electronically signed by resident: Gissell Mccarty MD  Date:    08/20/2018  Time:    10:54    Electronically signed by: Kassidy Pool MD  Date:    08/20/2018  Time:    11:12     4/17/18    CT findings of chronic interstitial  lung disease, which appear to have progressed in comparison to the prior study dated 07/10/2017.   Differential considerations include NSIP and subacute hypersensitivity pneumonitis, with UIP thought to be less likely given the absence of honeycombing.    Enlargement of the pulmonary arteries, which may be seen in the setting of pulmonary arterial hypertension.  Recommend correlation with pulmonary artery pressures.    Mitral annular and apparatus calcification.    Mild coronary artery calcification.    Cholelithiasis.    Cardiodiagnostics:    No visits with results within 7 Day(s) from this visit.   Latest known visit with results is:   Clinical Support on 09/06/2018   Component Date Value    EF 09/06/2018 65     Est. PA Systolic Pressure 09/06/2018 90.34*    Tricuspid Valve Regurgit* 09/06/2018 MODERATE*       Assessment:  1. ILD (interstitial lung disease)    2. Lung transplant candidate    3. Right ventricular failure    4. Pulmonary hypertension, unspecified    5. Morbid obesity due to excess calories      Plan:     1. Etiology of his disease is not clear, however chronic HP vs IPF seem likely culprits. Unlikely a biopsy would be possible or helpful at this point. Very severe disease with poor functional status and prognosis regardless of the initial cause.    2. We discussed with him that at the present time he would not be a suitable candidate for lung transplant to his debility and his obesity. We offered him a re-evaluation in 6 months time, which gives him the opportunity to pursue pulmonary rehab (which he is motivated to attend) to improve his exercise tolerance and to continue his weight loss efforts. We gave him a goal of another 40-50 lbs. Agree that a referral to bariatric surgery for medical therapies is warranted and could be helpful for him. We would like to see him perform at least 800 ft on a 6 mwt. Today he only made 50 ft.     3. Secondary to pulmonary hypertension as below. No apparent  additional therapies aside from diuresis at this point.     4. PH is likely multifactorial, but suspected to be a combination of WHO group II and III disease. Agree with continuing present diuresis strategy.    5. As outlined above, agree that he would benefit from consultation with bariatric surgery to discuss medical options to help his weight loss.     RTC in 6 months with PFT and 6 MWT.    Discussed and seen today with Dr Monique.    Ray Arroyo MD  [facility]monary and Critical Care Fellow  Pager: [phone]  Cell: [phone]    Attending Note:    I have seen and evaluated the patient with the fellow. Their note reflects the content of our discussion and my plan of care. Patient appears motivated to start pulmonary rehab at [facility]. Unknown if referral has been placed by Dr. Oliver. Would also benefit from consultation with Dr. Annelise Nicole from Bariatric Medicine.      Lewis Monique MD  Pulmonary/Critical Care Medicine

## 2018-09-26 PROBLEM — I27.81 COR PULMONALE: Status: ACTIVE | Noted: 2018-01-01

## 2018-09-26 PROBLEM — J96.21 ACUTE ON CHRONIC RESPIRATORY FAILURE WITH HYPOXIA: Status: ACTIVE | Noted: 2018-01-01

## 2018-09-26 PROBLEM — R09.02 HYPOXIA: Status: ACTIVE | Noted: 2018-01-01

## 2018-09-26 PROBLEM — R07.9 CHEST PAIN: Status: ACTIVE | Noted: 2018-01-01

## 2018-09-26 NOTE — ED PROVIDER NOTES
----- Message from Swati Sexton RN sent at 3/24/2017  3:35 PM CDT -----  Patient is discharging today and needs a f/u appt.  Please contact patient with appt.  Thank you   Encounter Date: 9/26/2018       History     Chief Complaint   Patient presents with    LOW O2 Sats     Pt reports O2 sats in 70's today-pt on home O2-6-8L.  Pt has lung disease.      Mr. Patrick Dennis is a 58 year old male with history of pulmonary HTN and ILD who presents with 3-4 days of progressively worsening dyspnea, worse with ambulation. He reports that at baseline he feels short of breath but he has noticed a worsening over the preceding days that he is uncomfortable dealing with by titrating his oxygen at home. He states that he frequently monitors his pulse ox at home and has noticed that he has been desatting to the 60%s with any ambulation with 6L O2 at rest and 8L with activity. He follows with Dr. Oliver outpatient for his lung disease. He was originally on steroid treatment, transitioned to cellcept and started on O2. Cellcept was discontinued due to no improvement in symptoms, and now on nintedanib. He also reports that he was started on lasix recently and has had improvement in his lower extremity edema.     Patient desatted to 70s in the ED, improved to 90s with O2 NC.          Review of patient's allergies indicates:  No Known Allergies  Past Medical History:   Diagnosis Date    Cough     IPF (idiopathic pulmonary fibrosis)      History reviewed. No pertinent surgical history.  Family History   Problem Relation Age of Onset    No Known Problems Mother     No Known Problems Father     Asthma Other     Lung disease Paternal Uncle     Emphysema Neg Hx      Social History     Tobacco Use    Smoking status: Never Smoker    Smokeless tobacco: Never Used   Substance Use Topics    Alcohol use: No    Drug use: No     Review of Systems   Constitutional: Positive for activity change and fatigue. Negative for chills, fever and unexpected weight change.   HENT: Positive for congestion, rhinorrhea and sinus pressure. Negative for sore throat and trouble swallowing.    Respiratory: Positive for cough  (productive of white sputum) and chest tightness. Negative for shortness of breath.    Cardiovascular: Positive for leg swelling (chronic). Negative for chest pain.   Gastrointestinal: Negative for abdominal distention, abdominal pain, constipation, diarrhea and nausea.   Genitourinary: Negative for dysuria and flank pain.   Musculoskeletal: Positive for arthralgias (ankle pain bilaterally), back pain and joint swelling.   Skin: Negative for rash.   Neurological: Positive for dizziness and light-headedness. Negative for weakness.   Hematological: Does not bruise/bleed easily.   Psychiatric/Behavioral: Negative for confusion and decreased concentration.       Physical Exam     Initial Vitals [09/26/18 1641]   BP Pulse Resp Temp SpO2   121/74 103 (!) 24 97.9 °F (36.6 °C) (!) 86 %      MAP       --         Physical Exam    Constitutional: He appears well-developed and well-nourished. He appears distressed.   HENT:   Head: Normocephalic and atraumatic.   Right Ear: External ear normal.   Left Ear: External ear normal.   Mouth/Throat: Oropharynx is clear and moist.   Eyes: Conjunctivae and EOM are normal.   Neck: JVD: difficult to assess 2/2 patient body habitus.   Cardiovascular: Normal rate, regular rhythm and intact distal pulses.   Murmur heard.  Pulmonary/Chest: No stridor. He is in respiratory distress (mild). He has no wheezes. He has rhonchi. He has rales (bilateral lung bases).   Abdominal: Soft. Bowel sounds are normal. He exhibits no distension. There is no tenderness.   Musculoskeletal: Normal range of motion. He exhibits edema (pitting 1+ to mid-calf).   Neurological: He is alert and oriented to person, place, and time. He has normal strength. No cranial nerve deficit. GCS score is 15. GCS eye subscore is 4. GCS verbal subscore is 5. GCS motor subscore is 6.   Skin: Skin is warm and dry. Capillary refill takes less than 2 seconds. No pallor.   There is hyperpigmentation in bilateral lower extremity ankle  regions   Psychiatric: He has a normal mood and affect. His behavior is normal. Thought content normal.         ED Course   Procedures  Labs Reviewed   TROPONIN I - Abnormal; Notable for the following components:       Result Value    Troponin I 0.027 (*)     All other components within normal limits   B-TYPE NATRIURETIC PEPTIDE - Abnormal; Notable for the following components:    BNP 1,389 (*)     All other components within normal limits   CBC W/ AUTO DIFFERENTIAL - Abnormal; Notable for the following components:    MCHC 30.2 (*)     Platelets 129 (*)     Immature Granulocytes 0.8 (*)     Immature Grans (Abs) 0.05 (*)     Lymph% 15.2 (*)     All other components within normal limits   COMPREHENSIVE METABOLIC PANEL - Abnormal; Notable for the following components:    Chloride 90 (*)     CO2 37 (*)     Glucose 119 (*)     Total Protein 8.5 (*)     Total Bilirubin 1.5 (*)     All other components within normal limits   HEMOGLOBIN A1C - Abnormal; Notable for the following components:    Hemoglobin A1C 5.9 (*)     All other components within normal limits    Narrative:     ADD-ON PCAL #[id number] PER SIERRA CANNON MD 19:24    09/26/2018   ADD-ON GB #[id number] PER SIERRA CANNON MD 19:34    09/26/2018    ISTAT PROCEDURE - Abnormal; Notable for the following components:    POC PCO2 60.9 (*)     POC PO2 45 (*)     POC HCO3 37.9 (*)     POC SATURATED O2 79 (*)     POC TCO2 40 (*)     All other components within normal limits   CULTURE, BLOOD   CULTURE, BLOOD   CULTURE, RESPIRATORY   RESPIRATORY VIRAL PANEL BY PCR   TROPONIN I   PROCALCITONIN   HEMOGLOBIN A1C   URINALYSIS, REFLEX TO URINE CULTURE    Narrative:     ORANGE CUP SENT   PROCALCITONIN    Narrative:     add on tsh   ADD-ON PCAL #[id number] PER SIERRA CANNON MD 19:24    09/26/2018   ADD-ON [facility] #[id number] PER SIERRA CANNON MD 19:34    09/26/2018    TSH   TSH    Narrative:     add on tsh   ADD-ON PCAL #[id number] PER SIERRA SOLIMAN  MD KASSANDRA 19:24    09/26/2018   ADD-ON [facility] #[id number] RANDOLPH SOLIMAN MD KASSANDRA 19:34    09/26/2018    LEGIONELLA ANTIGEN, URINE RANDOM   POCT GLUCOSE MONITORING CONTINUOUS     EKG Readings: (Independently Interpreted)   Initial Reading: No STEMI. Rhythm: Normal Sinus Rhythm. Heart Rate: 103. Ectopy: No Ectopy. Conduction: Normal. ST Segments: Normal ST Segments. T Waves Flipped: AVR. Axis: Normal. Clinical Impression: Normal Sinus Rhythm Other Impression: peaked p waves on limb leads.        Imaging Results          X-Ray Chest AP Portable (Final result)  Result time 09/26/18 18:21:48    Final result by Annette Chen MD (09/26/18 18:21:48)                 Impression:      There is patchy opacification of the pulmonary parenchyma progressed from prior exam.  This may represent acute etiology on the patient's chronic interstitial lung disease.  Such as edema or pneumonia.      Electronically signed by: Annette Chen MD  Date:    09/26/2018  Time:    18:21             Narrative:    EXAMINATION:  XR CHEST AP PORTABLE    CLINICAL HISTORY:  Chest Pain;    TECHNIQUE:  Single frontal view of the chest was performed.    COMPARISON:  None    FINDINGS:  There is patchy opacification of the pulmonary parenchyma.  This is progressed from prior exam.                                 Medical Decision Making:   History:   Old Medical Records: I decided to obtain old medical records.  Initial Assessment:   58 year old male with history of pHTN and ILD who presents with 1 week of acute on chronic dyspnea with exertion and increasing oxygen requirements.  Differential Diagnosis:   Worsening pHTN/interstitial lung disease, COPD exacerbation, new onset CHF, acute on chronic hypoxemic respiratory failure. Possible combination of factors contributing  Clinical Tests:   Lab Tests: Ordered and Reviewed  The following lab test(s) were unremarkable: BNP, CBC, CMP and Troponin  Radiological Study: Ordered and Reviewed       APC /  Resident Notes:   6:02 PM  Given nitro and solumedrol 125mg x1, Receiving nebulizer treatment, satting 90s on oxygen currently, now appears comfortable.    6:51 PM  CXR completed, shows patchy opacification. BNP elevated >1000. Will administer lasix 40mg IV. Plan to admit to hospital medicine team 2 for further diuresis.         Attending Attestation:   Physician Attestation Statement for Resident:  As the supervising MD   Physician Attestation Statement: I have personally seen and examined this patient.    As the supervising MD I agree with the above PE.    As the supervising MD I agree with the above treatment, course, plan, and disposition.   -: ? Worsening of underlying lung disease with min exertion, or possible new onset chf. With elevated bnp and leg edema will diurese. Noted recent ef 50-60%.  Will admit to medicine to further diurese and eval exertional dyspnea.                        Clinical Impression:   The primary encounter diagnosis was Congestive heart failure, unspecified HF chronicity, unspecified heart failure type. Diagnoses of Chest pain and Hypoxia were also pertinent to this visit.      Disposition:   Disposition: Admitted  Condition: Tamiko Escalante MD  09/26/18 2297

## 2018-09-26 NOTE — PROVIDER PROGRESS NOTES - EMERGENCY DEPT.
Encounter Date: 9/26/2018    ED Physician Progress Notes       SCRIBE NOTE: I, Denisha Gomez, am scribing for, and in the presence of,  Dr. Munoz .  Physician Statement: I, Dr. Munoz , personally performed the services described in this documentation as scribed by Denisha Gomez in my presence, and it is both accurate and complete.      EKG - STEMI Decision  Initial Reading: No STEMI present.

## 2018-09-26 NOTE — ED NOTES
LOC: The patient is awake, alert, and oriented to place, time, situation. Affect is appropriate.  Speech is difficult with speaking.    APPEARANCE: Patient resting uncomfortably, in acute distress.  Patient is clean and well groomed.    SKIN: The skin is warm and dry; color consistent with ethnicity.  Patient has normal skin turgor and dry mucus membranes.  Skin intact; no breakdown or bruising noted.     MUSCULOSKELETAL: Patient moving upper and lower extremities without difficulty.  Reporting weakness.     RESPIRATORY: Airway is open.  Diminished BS lower lobes right side and left lobes. Respirations labored.  Patient has an increased effort and rate.   Denies cough.     CARDIAC:   Peripheral edema noted. Complaints of chest tightness.      ABDOMEN: Soft and non tender to palpation.  No distention noted.     NEUROLOGIC: Eyes open spontaneously.  Behavior appropriate to situation.  Follows commands; facial expression symmetrical.  Purposeful motor response noted; normal sensation in all extremities.

## 2018-09-27 NOTE — ASSESSMENT & PLAN NOTE
ACUTE ON CHRONIC RESPIRATORY FAILURE  COR PULMONALE  PULMONARY HYPERTENSION    Mr. Dennis is a 58/male with ILD (on 6 L O2 at home), Pulm HTN, & Cor Pulmonale who presents for acute on chronic respiratory failure. He has advanced pulm disease and was evaluated for transplant by Dr. Monique on 9/17 (not a lung transplant candidate at this time due to debility and obesity). Pt's symptoms have improved after receiving steroids, diuretics, and Duo-nebs. There are no overt signs of an infection precipitating respiratory failure. Pt does appear to be retaining fluid, so diuresis should be continued. The dominic nonblanching discoloration in his feet may be suggestive of a vasculitis.     Recommendations:  - Wean supplemental O2 to his home regimen (6 - 7 L O2)  - Start Prednisone 40 mg daily  - Would hold IVIG at this time because the increased volume could worsen respiratory status  - Agree with continuing diuretics   - Agree with continue Ofev and duo-nebs  - Follow up respiratory culture results

## 2018-09-27 NOTE — ASSESSMENT & PLAN NOTE
-- CP started in ED, heaviness on lest side of the chest, constant, not relived by nitroglycerin paste.  -- Trop 0.027 >>>> 0.019  -- ECG no ST changes.

## 2018-09-27 NOTE — ASSESSMENT & PLAN NOTE
This is a 57 y/o male with hx of ILD on home oxygen 5L at home, presented with increase base line SOB and oxygen requirement.  -- Exam: low sats 86% on 6 L, tachycardic (baseline tachycardic ~100), lung scatred rhonchi, L.L pitting edema.  -- Labs showed high BNP, high bicarb on CMP, CXR progression on previous patchy findings   -- CTA done 09/11 did not show PE.    DDx: ILD exacerbation, vs CHF exacerbation, considering PE in the differential.  1- Continue supplemental o2, Goal 90-92% >>> would consider comfort flow for symptoms relief.   2- Lasix 40 mg IV TID.  3- Duonebs Q6.  4- U/S lower extremities to r/o DVT.  5- Continue home Ofev 150 mg q12 (day 113).  6- Abx: will defer at this point as no signs of obvious infection, would consider it, if pt showed no improvement.  7- Steroids: will hold off as steroids, did not improve pt symptoms previously, will added if symptoms worsen.  8- strict In/out, daily weight.

## 2018-09-27 NOTE — ASSESSMENT & PLAN NOTE
-- Echo 09/06 :(EF 60-65%), Right ventricular enlargement with severely depressed systolic function,PA systolic pressure is 90 mmHg.   -- due to group 3 PH.  -- On lasix 80 mg daily at home.

## 2018-09-27 NOTE — SUBJECTIVE & OBJECTIVE
Past Medical History:   Diagnosis Date    Cough     IPF (idiopathic pulmonary fibrosis)        History reviewed. No pertinent surgical history.    Review of patient's allergies indicates:  No Known Allergies    No current facility-administered medications on file prior to encounter.      Current Outpatient Medications on File Prior to Encounter   Medication Sig    acetaminophen (TYLENOL) 325 MG tablet Take 650 mg by mouth every 6 (six) hours as needed for Pain.    albuterol (PROVENTIL) 2.5 mg /3 mL (0.083 %) nebulizer solution Take 3 mLs (2.5 mg total) by nebulization every 6 (six) hours as needed for Shortness of Breath.    diphenhydrAMINE (BENADRYL) 25 mg capsule Take 25 mg by mouth every 6 (six) hours as needed for Allergies.    furosemide (LASIX) 20 MG tablet Take 3-4 tablets (60-80 mg total) by mouth once daily. (Patient taking differently: Take 80 mg by mouth once daily. )    Immune Globulin G, IGG,-PRO-IGA 10 % injection, Privigen, 10 % Soln Inject 750 mLs (75 g total) into the vein every 28 days.    nintedanib (OFEV) 150 mg Cap Take 150 mg by mouth 2 (two) times daily.    potassium chloride SA (K-DUR,KLOR-CON) 10 MEQ tablet Take 1 tablet (10 mEq total) by mouth 2 (two) times daily.    fexofenadine (ALLEGRA) 180 MG tablet Take 180 mg by mouth once daily.    guaiFENesin (HUMIBID E) 400 mg Tab Take 400 mg by mouth 2 (two) times daily.     Family History     Problem Relation (Age of Onset)    Asthma Other    Lung disease Paternal Uncle    No Known Problems Mother, Father        Tobacco Use    Smoking status: Never Smoker    Smokeless tobacco: Never Used   Substance and Sexual Activity    Alcohol use: No    Drug use: No    Sexual activity: Not on file     Review of Systems   Constitutional: Positive for fatigue. Negative for chills and fever.   HENT: Negative for congestion.    Respiratory: Positive for cough and shortness of breath.    Cardiovascular: Positive for chest pain, palpitations and leg  swelling.   Gastrointestinal: Negative for abdominal pain, diarrhea, nausea and vomiting.   Genitourinary: Negative for decreased urine volume, dysuria and hematuria.   Neurological: Negative for syncope, light-headedness and headaches.   Psychiatric/Behavioral: Negative for agitation.     Objective:     Vital Signs (Most Recent):  Temp: 97.9 °F (36.6 °C) (09/26/18 1641)  Pulse: 107 (09/26/18 1932)  Resp: 12 (09/26/18 1922)  BP: 128/68 (09/26/18 1947)  SpO2: (!) 93 % (09/26/18 1941) Vital Signs (24h Range):  Temp:  [97.9 °F (36.6 °C)] 97.9 °F (36.6 °C)  Pulse:  [] 107  Resp:  [12-38] 12  SpO2:  [86 %-100 %] 93 %  BP: (111-135)/(55-80) 128/68     Weight: 117.9 kg (260 lb)  Body mass index is 37.31 kg/m².    Physical Exam   Constitutional: He appears well-developed and well-nourished. He appears ill. Nasal cannula in place.   HENT:   Head: Normocephalic and atraumatic.   Neck: Normal range of motion. Neck supple.   Cardiovascular: Regular rhythm.   tachycardic   Pulmonary/Chest: Effort normal. He has rales.   Abdominal: Soft. Bowel sounds are normal. He exhibits distension.   Musculoskeletal: He exhibits edema.   Bilateral lower limb pitting edema with chronic erythema around feet   Skin: Skin is warm and dry. There is erythema.   Psychiatric: He has a normal mood and affect. His behavior is normal.           Significant Labs:   BMP:   Recent Labs   Lab  09/26/18   1727   GLU  119*   NA  138   K  4.7   CL  90*   CO2  37*   BUN  17   CREATININE  1.1   CALCIUM  10.2     CBC:   Recent Labs   Lab  09/26/18   1727   WBC  6.37   HGB  15.6   HCT  51.7   PLT  129*     CMP:   Recent Labs   Lab  09/26/18   1727   NA  138   K  4.7   CL  90*   CO2  37*   GLU  119*   BUN  17   CREATININE  1.1   CALCIUM  10.2   PROT  8.5*   ALBUMIN  3.6   BILITOT  1.5*   ALKPHOS  80   AST  26   ALT  19   ANIONGAP  11   EGFRNONAA  >60.0     Cardiac Markers:   Recent Labs   Lab  09/26/18   1727   BNP  1,389*     Coagulation: No results for  input(s): PT, INR, APTT in the last 48 hours.  Lactic Acid: No results for input(s): LACTATE in the last 48 hours.  Lipase: No results for input(s): LIPASE in the last 48 hours.  Lipid Panel: No results for input(s): CHOL, HDL, LDLCALC, TRIG, CHOLHDL in the last 48 hours.  Magnesium: No results for input(s): MG in the last 48 hours.  POCT Glucose: No results for input(s): POCTGLUCOSE in the last 48 hours.  Urine Studies:   Recent Labs   Lab  09/26/18 1944   COLORU  Yellow   APPEARANCEUA  Clear   PHUR  6.0   SPECGRAV  1.010   PROTEINUA  Negative   GLUCUA  Negative   KETONESU  Negative   BILIRUBINUA  Negative   OCCULTUA  Negative   NITRITE  Negative   UROBILINOGEN  2.0   LEUKOCYTESUR  Negative     All pertinent labs within the past 24 hours have been reviewed.    Significant Imaging: I have reviewed all pertinent imaging results/findings within the past 24 hours.

## 2018-09-27 NOTE — ASSESSMENT & PLAN NOTE
"-- Saw Dr. Monique 09/17, Per Note " at the present time he would not be a suitable candidate for lung transplant to his debility and his obesity. Offered him a re-evaluation in 6 months time, which gives him the opportunity to pursue pulmonary rehab to improve his exercise tolerance and to continue his weight loss efforts, ( goal of another 40-50 lbs). referral to bariatric surgery for medical therapies Goal to least 800 ft on a 6 mwt.)  -- six mwt 09/17 = 50 ft.     "

## 2018-09-27 NOTE — HPI
Mr. Dennis is a 58 year old male with history of ILD (on home oxygen 6 L), pulmonary HTN, and cor pulmonale who presents with 3-4 days of progressively worsening dyspnea. He states that his resting SpO2 is normally in the low 90s, but last night it was in the 80s and did not improve after he increased his O2 flow to 8L/min. Pt was on max O2 flow at home and still felt significally short of breath so his family brought him to ED. ROS also significant for cough with occasional white/forthy sputum, and lower limb edema. He reports 9 lb weight loss in last 2 weeks due to diuretic use. He denies fever, chills, chest pain, diarrhea, dysuria, recent travel, and interaction with sick contacts.      Pt was diagnosed with ILD, possibly hypersensitivity 2/2 pine exposure from [employer] work, by Dr. Burgos in [place]. He was treated with prednisone without much improvement in symptoms, even after leaving his job.  He now follows up with Dr. Oliver (previously saw Dr. Solorio). Workup at [facility] has included connective tissue disease serology (JOSH, ANCA, SCL-70, SSA, & SSB), all of which were all negative. Allergy testing was also unremarkbale. He was on Cellcept from 02/2018 until 07/2017 of this year. Cellcept was discontinued because it did not improve his symptoms. During that time, he was also weaned off prednisone. He was started on Ofev in 05/2018, which he reports has improved his symptoms. He has also be receiving IVIG since July. He states he usually feels SOB and fatigue on day 1 of IVIG infusions, but it improves by day 3. He was evaluated for transplant by Dr. Monique on 9/17 (not a lung transplant candidate at this time due to debility and obesity).

## 2018-09-27 NOTE — ASSESSMENT & PLAN NOTE
-- Allergen testing in addition to a basic rheumatologist work up including JOSH, ANCA, SCL-70, SSA and SSB were all unremarkable

## 2018-09-27 NOTE — HPI
Mr. Dennis is a 58 year old male with history of ILD (on home oxygen 6 L), pulmonary HTN, and cor pulmonale who presents with 3-4 days of progressively worsening dyspnea. He states that his resting SpO2 is normally in the low 90s, but last night it was in the 80s and did not improve after he increased his O2 flow to 8L/min. Pt was on max O2 flow at home and still felt significally short of breath so his family brought him to  ED.  ROS also significant for cough with occasional white/forthy sputum, and lower limb edema. He reports 9 lb weight loss in last 2 weeks due to diuretic use. He denies fever, chills, chest pain, diarrhea, dysuria, recent travel, and interaction with sick contacts.     Pt was diagnosed with ILD, possibly hypersensitivity 2/2 pine exposure from [employer] work, by Dr. Burgos in [place]. He was treated with prednisone without much improvement in symptoms, even after leaving his job.   He now follows up with Dr. Oliver (previously saw Dr. Solorio). Workup at [facility] has included connective tissue disease serology (JOSH, ANCA, SCL-70, SSA, & SSB), all of which were all negative. Allergy testing was also unremarkbale. He was on Cellcept from 02/2018 until 07/2017 of this year. Cellcept was discontinued because it did not improve his symptoms. During that time, he was also weaned off prednisone. He was started on Ofev in 05/2018, which he reports has improved his symptoms. He has also be receiving IVIG since July. He states he usually feels SOB and fatigue on day 1 of IVIG infusions, but it improves by day 3.  He was evaluated for transplant by Dr. Monique on 9/17 (not a lung transplant candidate at this time due to debility and obesity).

## 2018-09-27 NOTE — HOSPITAL COURSE
In ED 09/26, pt was given lasix 40 mg IVx1, solumedrol 125 mg IVx1, duonebs and supplemental o2, admitted to hospital medicine from acute on chronic respiratory failure, Patient was started on nebulized breathing treatments q6h and began aggressive diuresis with 40 mg IV Lasix TID.Patient responded adequately to this regimen with good UOP (~6 L over 36 hours -> 1.45 mg/kg/hr).  After 3 days, he was then transitioned to Lasix 80 mg PO BID. Patient was originally placed on 10 liters O2 via high-flow NC but has clinically improved with diuresis and is now close to his home O2 requirements of 6 liters, currently wavering between 6-8 L O2. He will likely need his home O2 machine to be re-calibrated to have the capability to titrate up to 10L O2 upon discharge.

## 2018-09-27 NOTE — PLAN OF CARE
On Discharge planning assessment patient is in bed, resting quietly, with mother at the bedside.  Introduced myself and CM role in patients care plan, patient verbalized understanding.     Pt lives in a single story home with his mother and his sisters are a support system within close range. His mother is able to take him home upon discharge. Patient denies HD, H/H and coumadin. Pt does get Pulomary infusion services, but does not know the company name. He has Oxygen normally at 6L at Home supplied by [employer],  that he uses while home. Verified Pts Address, Emergency Contact, Pharmacy and PCP.     Pt denies any concerns for this visit, CM will continue to follow. CM name and number placed on patients white board and Health Packet given to the patient with a brief overview of the information provided patient verbalized understanding.        09/27/18 1036   Discharge Assessment   Assessment Type Discharge Planning Assessment   Confirmed/corrected address and phone number on facesheet? Yes   Assessment information obtained from? Patient   Communicated expected length of stay with patient/caregiver no  (MD will discuss )   Prior to hospitilization cognitive status: Alert/Oriented;No Deficits   Prior to hospitalization functional status: Independent   Current cognitive status: Alert/Oriented;No Deficits   Current Functional Status: Independent   Facility Arrived From: N/A   Lives With parent(s)   Able to Return to Prior Arrangements yes   Is patient able to care for self after discharge? Yes   Who are your caregiver(s) and their phone number(s)? Swathi [phone]   Patient's perception of discharge disposition home or selfcare   Readmission Within The Last 30 Days no previous admission in last 30 days   Patient currently being followed by outpatient case management? No   Patient currently receives any other outside agency services? Yes   Name and contact number of agency or person providing outside  services Infusion services   Is it the patient/care giver preference to resume care with the current outside agency? Yes   Equipment Currently Used at Home oxygen   Do you have any problems affording any of your prescribed medications? No   Is the patient taking medications as prescribed? yes   Does the patient have transportation home? Yes   Transportation Available car;family or friend will provide   Dialysis Name and Scheduled days N/A   Does the patient receive services at the [facility]? No   Discharge Plan A Home     Extended Emergency Contact Information  Primary Emergency Contact: Swathi Dennis   [facility]  Home Phone: [phone]  Mobile Phone: [phone]  Relation: Mother      [facility] [address] [place] [address] [facility]  [address] [facility] [address]  Phone: [phone] Fax: [phone]

## 2018-09-27 NOTE — H&P
[facility] Medicine  History & Physical    Patient Name: Patrick Dennis  MRN: [id number]  Admission Date: 9/26/2018  Attending Physician: Kim Sheppard MD   Primary Care Provider: Mayra Burgos MD    [facility] Medicine Team: [facility] 2 Royce Banda MD     Patient information was obtained from patient, parent and ER records.     Subjective:     Principal Problem:Acute on chronic respiratory failure with hypoxia    Chief Complaint:   Chief Complaint   Patient presents with    LOW O2 Sats     Pt reports O2 sats in 70's today-pt on home O2-6-8L.  Pt has lung disease.         HPI: This is a 58 year old male with history of pulmonary HTN and ILD (on home oxygen 6 L)   presents with 3-4 days of progressively worsening dyspnea,He states that he frequently monitors his pulse ox at home and has noticed that he has been desatting to the 60%s with any ambulation with 6L O2 and has been increasing to 8 L. He received his IVIG 09/25. He has chronic lower extremities swelling and state that it did not worsen.    Denies any fevers, chills, N/V, change in bowel habits. No hx of dysuria, hematuria or decreased in UOP.        Past Medical History:   Diagnosis Date    Cough     IPF (idiopathic pulmonary fibrosis)        History reviewed. No pertinent surgical history.    Review of patient's allergies indicates:  No Known Allergies    No current facility-administered medications on file prior to encounter.      Current Outpatient Medications on File Prior to Encounter   Medication Sig    acetaminophen (TYLENOL) 325 MG tablet Take 650 mg by mouth every 6 (six) hours as needed for Pain.    albuterol (PROVENTIL) 2.5 mg /3 mL (0.083 %) nebulizer solution Take 3 mLs (2.5 mg total) by nebulization every 6 (six) hours as needed for Shortness of Breath.    diphenhydrAMINE (BENADRYL) 25 mg capsule Take 25 mg by mouth every 6 (six) hours as needed for Allergies.    furosemide (LASIX)  20 MG tablet Take 3-4 tablets (60-80 mg total) by mouth once daily. (Patient taking differently: Take 80 mg by mouth once daily. )    Immune Globulin G, IGG,-PRO-IGA 10 % injection, Privigen, 10 % Soln Inject 750 mLs (75 g total) into the vein every 28 days.    nintedanib (OFEV) 150 mg Cap Take 150 mg by mouth 2 (two) times daily.    potassium chloride SA (K-DUR,KLOR-CON) 10 MEQ tablet Take 1 tablet (10 mEq total) by mouth 2 (two) times daily.    fexofenadine (ALLEGRA) 180 MG tablet Take 180 mg by mouth once daily.    guaiFENesin (HUMIBID E) 400 mg Tab Take 400 mg by mouth 2 (two) times daily.     Family History     Problem Relation (Age of Onset)    Asthma Other    Lung disease Paternal Uncle    No Known Problems Mother, Father        Tobacco Use    Smoking status: Never Smoker    Smokeless tobacco: Never Used   Substance and Sexual Activity    Alcohol use: No    Drug use: No    Sexual activity: Not on file     Review of Systems   Constitutional: Positive for fatigue. Negative for chills and fever.   HENT: Negative for congestion.    Respiratory: Positive for cough and shortness of breath.    Cardiovascular: Positive for chest pain, palpitations and leg swelling.   Gastrointestinal: Negative for abdominal pain, diarrhea, nausea and vomiting.   Genitourinary: Negative for decreased urine volume, dysuria and hematuria.   Neurological: Negative for syncope, light-headedness and headaches.   Psychiatric/Behavioral: Negative for agitation.     Objective:     Vital Signs (Most Recent):  Temp: 97.9 °F (36.6 °C) (09/26/18 1641)  Pulse: 107 (09/26/18 1932)  Resp: 12 (09/26/18 1922)  BP: 128/68 (09/26/18 1947)  SpO2: (!) 93 % (09/26/18 1941) Vital Signs (24h Range):  Temp:  [97.9 °F (36.6 °C)] 97.9 °F (36.6 °C)  Pulse:  [] 107  Resp:  [12-38] 12  SpO2:  [86 %-100 %] 93 %  BP: (111-135)/(55-80) 128/68     Weight: 117.9 kg (260 lb)  Body mass index is 37.31 kg/m².    Physical Exam   Constitutional: He appears  well-developed and well-nourished. He appears ill. Nasal cannula in place.   HENT:   Head: Normocephalic and atraumatic.   Neck: Normal range of motion. Neck supple.   Cardiovascular: Regular rhythm.   tachycardic   Pulmonary/Chest: Effort normal. He has rales.   Abdominal: Soft. Bowel sounds are normal. He exhibits distension.   Musculoskeletal: He exhibits edema.   Bilateral lower limb pitting edema with chronic erythema around feet   Skin: Skin is warm and dry. There is erythema.   Psychiatric: He has a normal mood and affect. His behavior is normal.           Significant Labs:   BMP:   Recent Labs   Lab  09/26/18   1727   GLU  119*   NA  138   K  4.7   CL  90*   CO2  37*   BUN  17   CREATININE  1.1   CALCIUM  10.2     CBC:   Recent Labs   Lab  09/26/18 1727   WBC  6.37   HGB  15.6   HCT  51.7   PLT  129*     CMP:   Recent Labs   Lab  09/26/18   1727   NA  138   K  4.7   CL  90*   CO2  37*   GLU  119*   BUN  17   CREATININE  1.1   CALCIUM  10.2   PROT  8.5*   ALBUMIN  3.6   BILITOT  1.5*   ALKPHOS  80   AST  26   ALT  19   ANIONGAP  11   EGFRNONAA  >60.0     Cardiac Markers:   Recent Labs   Lab  09/26/18   1727   BNP  1,389*     Coagulation: No results for input(s): PT, INR, APTT in the last 48 hours.  Lactic Acid: No results for input(s): LACTATE in the last 48 hours.  Lipase: No results for input(s): LIPASE in the last 48 hours.  Lipid Panel: No results for input(s): CHOL, HDL, LDLCALC, TRIG, CHOLHDL in the last 48 hours.  Magnesium: No results for input(s): MG in the last 48 hours.  POCT Glucose: No results for input(s): POCTGLUCOSE in the last 48 hours.  Urine Studies:   Recent Labs   Lab  09/26/18 1944   COLORU  Yellow   APPEARANCEUA  Clear   PHUR  6.0   SPECGRAV  1.010   PROTEINUA  Negative   GLUCUA  Negative   KETONESU  Negative   BILIRUBINUA  Negative   OCCULTUA  Negative   NITRITE  Negative   UROBILINOGEN  2.0   LEUKOCYTESUR  Negative     All pertinent labs within the past 24 hours have been  reviewed.    Significant Imaging: I have reviewed all pertinent imaging results/findings within the past 24 hours.    Assessment/Plan:     * Acute on chronic respiratory failure with hypoxia    This is a 57 y/o male with hx of ILD on home oxygen 5L at home, presented with increase base line SOB and oxygen requirement.  -- Exam: low sats 86% on 6 L, tachycardic (baseline tachycardic ~100), lung scatred rhonchi, L.L pitting edema.  -- Labs showed high BNP, high bicarb on CMP, CXR progression on previous patchy findings   -- CTA done 09/11 did not show PE.    DDx: ILD exacerbation, vs CHF exacerbation, considering PE in the differential.  1- Continue supplemental o2, Goal 90-92% >>> would consider comfort flow for symptoms relief.   2- Lasix 40 mg IV TID.  3- Duonebs Q6.  4- U/S lower extremities to r/o DVT.  5- Continue home Ofev 150 mg q12 (day 113).  6- Abx: will defer at this point as no signs of obvious infection, would consider it, if pt showed no improvement.  7- Steroids: will hold off as steroids, did not improve pt symptoms previously, will added if symptoms worsen.  8- strict In/out, daily weight.          IPF (idiopathic pulmonary fibrosis)    -- Allergen testing in addition to a basic rheumatologist work up including JOSH, ANCA, SCL-70, SSA and SSB were all unremarkable          ILD (interstitial lung disease)    -- In regard to therapies, he was initially started on prednisone in [place] with essentially no change in disease course. He was later seen by Dr. Solorio in the [facility] clinic and was started on Cellcept about a year ago for several months (stopped in July). This too had little impact on his disease trajectory. He was placed on Ofev in May of 2018 and may have noticed some decrease in his cough, however overall no significant change in his dyspnea or progression. He has also been following with an allergist, who had recommend a trial of IVIG infusions -- although it is not clear these have improved  "or delayed his progress, he does report feeling better typically a few days after receiving the infusions.   -- Pt of Dr. Oliver, last seen 09/06  -- Currently on Ofev (Day 113), and IVIG (last dose 09/25).  -- Last PFT 09/17:   * FEV1/FVC ratio 84%          Lung transplant candidate    -- Saw Dr. Monique 09/17, Per Note " at the present time he would not be a suitable candidate for lung transplant to his debility and his obesity. Offered him a re-evaluation in 6 months time, which gives him the opportunity to pursue pulmonary rehab to improve his exercise tolerance and to continue his weight loss efforts, ( goal of another 40-50 lbs). referral to bariatric surgery for medical therapies Goal to least 800 ft on a 6 mwt.)  -- six mwt 09/17 = 50 ft.           Cor pulmonale    -- Echo 09/06 :(EF 60-65%), Right ventricular enlargement with severely depressed systolic function,PA systolic pressure is 90 mmHg.   -- due to group 3 PH.  -- On lasix 80 mg daily at home.        Pulmonary hypertension, unspecified    -- Echo 09/06 :  ** PA systolic pressure is 90 mmHg.           Edema, lower extremity              Chest pain    -- CP started in ED, heaviness on lest side of the chest, constant, not relived by nitroglycerin paste.  -- Trop 0.027 >>>> 0.019  -- ECG no ST changes.            Hypoxia                VTE Risk Mitigation (From admission, onward)        Ordered     enoxaparin injection 40 mg  Daily      09/26/18 1918     IP VTE HIGH RISK PATIENT  Once      09/26/18 1917             Royce Banda MD  Department of Hospital Medicine   [facility]  "

## 2018-09-27 NOTE — SUBJECTIVE & OBJECTIVE
Past Medical History:   Diagnosis Date    Cough     IPF (idiopathic pulmonary fibrosis)        History reviewed. No pertinent surgical history.    Review of patient's allergies indicates:  No Known Allergies    Family History     Problem Relation (Age of Onset)    Asthma Other    Lung disease Paternal Uncle    No Known Problems Mother, Father        Tobacco Use    Smoking status: Never Smoker    Smokeless tobacco: Never Used   Substance and Sexual Activity    Alcohol use: No    Drug use: No    Sexual activity: Not on file         Review of Systems   Constitutional: Negative for chills and fever.        Lost 9 lbs in 2 weeks. Pt attributes to diuretic use   HENT: Negative for sore throat, trouble swallowing and voice change.    Eyes: Negative for photophobia and pain.   Respiratory: Positive for cough and shortness of breath.    Cardiovascular: Positive for leg swelling. Negative for chest pain.        Orthopnea    Gastrointestinal: Negative for abdominal pain, constipation, diarrhea, nausea and vomiting.   Genitourinary: Negative for difficulty urinating and dysuria.   Musculoskeletal: Negative for neck stiffness.   Skin: Positive for color change. Negative for rash.   Neurological: Negative for dizziness, syncope and headaches.   Psychiatric/Behavioral: Negative for confusion.     Objective:     Vital Signs (Most Recent):  Temp: 98.7 °F (37.1 °C) (09/27/18 0526)  Pulse: 98 (09/27/18 0900)  Resp: 20 (09/27/18 0900)  BP: 117/79 (09/27/18 0334)  SpO2: (!) 91 % (09/27/18 0900) Vital Signs (24h Range):  Temp:  [97.9 °F (36.6 °C)-98.7 °F (37.1 °C)] 98.7 °F (37.1 °C)  Pulse:  [] 98  Resp:  [12-38] 20  SpO2:  [86 %-100 %] 91 %  BP: (111-137)/(55-90) 117/79     Weight: 121 kg (266 lb 12.1 oz)  Body mass index is 38.28 kg/m².      Intake/Output Summary (Last 24 hours) at 9/27/2018 0904  Last data filed at 9/27/2018 0800  Gross per 24 hour   Intake --   Output 2225 ml   Net -2225 ml       Physical Exam    Constitutional: He is oriented to person, place, and time. He appears well-developed. No distress.   HENT:   Head: Normocephalic and atraumatic.   Mouth/Throat: Oropharynx is clear and moist.   Eyes: Conjunctivae are normal.   Neck: Normal range of motion. Neck supple.   Cardiovascular: Normal rate.   Pulmonary/Chest: Accessory muscle usage present. No tachypnea. He has decreased breath sounds (globallly). He has wheezes. He has rales. He exhibits no tenderness.   On 7 L, comfort flow  Digital clubbing   Abdominal: Soft. There is no tenderness.   Musculoskeletal: He exhibits edema (bilateral lower limbs). He exhibits no tenderness or deformity.   Lymphadenopathy:     He has no cervical adenopathy.   Neurological: He is alert and oriented to person, place, and time.   Skin: Skin is warm and dry. There is erythema (dominic, nonblanching discoloration in feet).   Psychiatric: He has a normal mood and affect.   Nursing note and vitals reviewed.      Vents:  Oxygen Concentration (%): 40 (09/27/18 0334)    Lines/Drains/Airways     Peripheral Intravenous Line                 Peripheral IV - Single Lumen 05/29/18 0839 Left Forearm 121 days         Peripheral IV - Single Lumen 09/26/18 1714 Left Antecubital less than 1 day                Significant Labs:    CBC/Anemia Profile:  Recent Labs   Lab  09/26/18   1727  09/27/18   0642   WBC  6.37  3.08*   HGB  15.6  13.6*   HCT  51.7  43.8   PLT  129*  128*   MCV  92  91   RDW  13.2  13.3        Chemistries:  Recent Labs   Lab  09/26/18   1727  09/27/18   0642   NA  138  134*   K  4.7  4.3   CL  90*  90*   CO2  37*  35*   BUN  17  19   CREATININE  1.1  0.9   CALCIUM  10.2  9.6   ALBUMIN  3.6  3.4*   PROT  8.5*  7.8   BILITOT  1.5*  1.5*   ALKPHOS  80  70   ALT  19  18   AST  26  23   MG   --   1.6   PHOS   --   4.5       ABGs:   Recent Labs   Lab  09/26/18   1941   PH  7.401   PCO2  60.9*   HCO3  37.9*   POCSATURATED  79*   BE  13     Blood Culture:   Recent Labs   Lab   09/26/18 2007 09/26/18 2008   LABBLOO  No Growth to date  No Growth to date     Significant Imaging:   I have reviewed all pertinent imaging results/findings within the past 24 hours.

## 2018-09-27 NOTE — PROGRESS NOTES
1941 ABG attempted x 2 with results being Mixed -Venous results to Dr. JANNY Castro which is at bedside and is okay with results.   Ph 7.401  pco2 60.9  po2 45  Be 13  hco3 37.9  tco2 40  so2 79%

## 2018-09-27 NOTE — CONSULTS
[facility]  Pulmonology  Consult Note    Patient Name: Patrick Dennis  MRN: [id number]  Admission Date: 9/26/2018  Hospital Length of Stay: 1 days  Code Status: Full Code  Attending Physician: Kim Sheppard MD  Primary Care Provider: Alexis Oliver MD   Principal Problem: Acute on chronic respiratory failure with hypoxia    Inpatient consult to Pulmonology  Consult performed by: Larisa De Leon MD  Consult ordered by: Royce Banda MD        Subjective:     HPI:  Mr. Dennis is a 58 year old male with history of ILD (on home oxygen 6 L), pulmonary HTN, and cor pulmonale who presents with 3-4 days of progressively worsening dyspnea. He states that his resting SpO2 is normally in the low 90s, but last night it was in the 80s and did not improve after he increased his O2 flow to 8L/min. Pt was on max O2 flow at home and still felt significally short of breath so his family brought him to  ED.  ROS also significant for cough with occasional white/forthy sputum, and lower limb edema. He reports 9 lb weight loss in last 2 weeks due to diuretic use. He denies fever, chills, chest pain, diarrhea, dysuria, recent travel, and interaction with sick contacts.     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 Burgos in [place]. He was treated with prednisone without much improvement in symptoms, even after leaving his job.   He now follows up with Dr. Oliver (previously saw Dr. Solorio). Workup at [facility] has included connective tissue disease serology (JOSH, ANCA, SCL-70, SSA, & SSB), all of which were all negative. Allergy testing was also unremarkbale. He was on Cellcept from 02/2018 until 07/2017 of this year. Cellcept was discontinued because it did not improve his symptoms. During that time, he was also weaned off prednisone. He was started on Ofev in 05/2018, which he reports has improved his symptoms. He has also be receiving IVIG  since July. He states he usually feels SOB and fatigue on day 1 of IVIG infusions, but it improves by day 3.  He was evaluated for transplant by Dr. Monique on 9/17 (not a lung transplant candidate at this time due to debility and obesity).     Past Medical History:   Diagnosis Date    Cough     IPF (idiopathic pulmonary fibrosis)        History reviewed. No pertinent surgical history.    Review of patient's allergies indicates:  No Known Allergies    Family History     Problem Relation (Age of Onset)    Asthma Other    Lung disease Paternal Uncle    No Known Problems Mother, Father        Tobacco Use    Smoking status: Never Smoker    Smokeless tobacco: Never Used   Substance and Sexual Activity    Alcohol use: No    Drug use: No    Sexual activity: Not on file         Review of Systems   Constitutional: Negative for chills and fever.        Lost 9 lbs in 2 weeks. Pt attributes to diuretic use   HENT: Negative for sore throat, trouble swallowing and voice change.    Eyes: Negative for photophobia and pain.   Respiratory: Positive for cough and shortness of breath.    Cardiovascular: Positive for leg swelling. Negative for chest pain.        Orthopnea    Gastrointestinal: Negative for abdominal pain, constipation, diarrhea, nausea and vomiting.   Genitourinary: Negative for difficulty urinating and dysuria.   Musculoskeletal: Negative for neck stiffness.   Skin: Positive for color change. Negative for rash.   Neurological: Negative for dizziness, syncope and headaches.   Psychiatric/Behavioral: Negative for confusion.     Objective:     Vital Signs (Most Recent):  Temp: 98.7 °F (37.1 °C) (09/27/18 0526)  Pulse: 98 (09/27/18 0900)  Resp: 20 (09/27/18 0900)  BP: 117/79 (09/27/18 0334)  SpO2: (!) 91 % (09/27/18 0900) Vital Signs (24h Range):  Temp:  [97.9 °F (36.6 °C)-98.7 °F (37.1 °C)] 98.7 °F (37.1 °C)  Pulse:  [] 98  Resp:  [12-38] 20  SpO2:  [86 %-100 %] 91 %  BP: (111-137)/(55-90) 117/79     Weight:  121 kg (266 lb 12.1 oz)  Body mass index is 38.28 kg/m².      Intake/Output Summary (Last 24 hours) at 9/27/2018 0904  Last data filed at 9/27/2018 0800  Gross per 24 hour   Intake --   Output 2225 ml   Net -2225 ml       Physical Exam   Constitutional: He is oriented to person, place, and time. He appears well-developed. No distress.   HENT:   Head: Normocephalic and atraumatic.   Mouth/Throat: Oropharynx is clear and moist.   Eyes: Conjunctivae are normal.   Neck: Normal range of motion. Neck supple.   Cardiovascular: Normal rate.   Pulmonary/Chest: Accessory muscle usage present. No tachypnea. He has decreased breath sounds (globallly). He has wheezes. He has rales. He exhibits no tenderness.   On 7 L, comfort flow  Digital clubbing   Abdominal: Soft. There is no tenderness.   Musculoskeletal: He exhibits edema (bilateral lower limbs). He exhibits no tenderness or deformity.   Lymphadenopathy:     He has no cervical adenopathy.   Neurological: He is alert and oriented to person, place, and time.   Skin: Skin is warm and dry. There is erythema (dominic, nonblanching discoloration in feet).   Psychiatric: He has a normal mood and affect.   Nursing note and vitals reviewed.      Vents:  Oxygen Concentration (%): 40 (09/27/18 0334)    Lines/Drains/Airways     Peripheral Intravenous Line                 Peripheral IV - Single Lumen 05/29/18 0839 Left Forearm 121 days         Peripheral IV - Single Lumen 09/26/18 1714 Left Antecubital less than 1 day                Significant Labs:    CBC/Anemia Profile:  Recent Labs   Lab  09/26/18   1727  09/27/18   0642   WBC  6.37  3.08*   HGB  15.6  13.6*   HCT  51.7  43.8   PLT  129*  128*   MCV  92  91   RDW  13.2  13.3        Chemistries:  Recent Labs   Lab  09/26/18   1727  09/27/18   0642   NA  138  134*   K  4.7  4.3   CL  90*  90*   CO2  37*  35*   BUN  17  19   CREATININE  1.1  0.9   CALCIUM  10.2  9.6   ALBUMIN  3.6  3.4*   PROT  8.5*  7.8   BILITOT  1.5*  1.5*   ALKPHOS   "80  70   ALT  19  18   AST  26  23   MG   --   1.6   PHOS   --   4.5       ABGs:   Recent Labs   Lab  09/26/18   1941   PH  7.401   PCO2  60.9*   HCO3  37.9*   POCSATURATED  79*   BE  13     Blood Culture:   Recent Labs   Lab  09/26/18 2007 09/26/18 2008   LABBLOO  No Growth to date  No Growth to date     Significant Imaging:   I have reviewed all pertinent imaging results/findings within the past 24 hours.    Assessment/Plan:     * Acute on chronic respiratory failure with hypoxia    See "ILD (interstitial lung disease)"        ILD (interstitial lung disease)    ACUTE ON CHRONIC RESPIRATORY FAILURE  COR PULMONALE  PULMONARY HYPERTENSION    Mr. Dennis is a 58/male with ILD (on 6 L O2 at home), Pulm HTN, & Cor Pulmonale who presents for acute on chronic respiratory failure. He has advanced pulm disease and was evaluated for transplant by Dr. Monique on 9/17 (not a lung transplant candidate at this time due to debility and obesity). Pt's symptoms have improved after receiving steroids, diuretics, and Duo-nebs. There are no overt signs of an infection precipitating respiratory failure. Pt does appear to be retaining fluid, so diuresis should be continued. The dominic nonblanching discoloration in his feet may be suggestive of a vasculitis.     Recommendations:  - Wean supplemental O2 to his home regimen (6 - 7 L O2)  - Start Prednisone 40 mg daily  - Would hold IVIG at this time because the increased volume could worsen respiratory status  - Agree with continuing diuretics   - Agree with continue Ofev and duo-nebs  - Follow up respiratory culture results          Pulmonary hypertension, unspecified    See "ILD (interstitial lung disease)"        Cor pulmonale    See "ILD (interstitial lung disease)"        IPF (idiopathic pulmonary fibrosis)    See "ILD (interstitial lung disease)"              Thank you for your consult. I will follow-up with patient. Please contact us if you have any additional questions.   "   Larisa De Leon MD  Pulmonology  [facility]

## 2018-09-27 NOTE — ASSESSMENT & PLAN NOTE
-- In regard to therapies, he was initially started on prednisone in [place] with essentially no change in disease course. He was later seen by Dr. Solorio in the [facility] clinic and was started on Cellcept about a year ago for several months (stopped in July). This too had little impact on his disease trajectory. He was placed on Ofev in May of 2018 and may have noticed some decrease in his cough, however overall no significant change in his dyspnea or progression. He has also been following with an allergist, who had recommend a trial of IVIG infusions -- although it is not clear these have improved or delayed his progress, he does report feeling better typically a few days after receiving the infusions.   -- Pt of Dr. Oliver, last seen 09/06  -- Currently on Ofev (Day 113), and IVIG (last dose 09/25).  -- Last PFT 09/17:   * FEV1/FVC ratio 84%

## 2018-09-28 PROBLEM — R07.9 CHEST PAIN: Status: RESOLVED | Noted: 2018-01-01 | Resolved: 2018-01-01

## 2018-09-28 NOTE — PT/OT/SLP EVAL
Physical Therapy Evaluation    Patient Name:  Patrick Dennis   MRN:  [id number]    Recommendations:     Discharge Recommendations:  home with home health(Pulm Rehab)   Discharge Equipment Recommendations: walker, rolling, wheelchair   Barriers to discharge: None    Assessment:     Patrick Dennis is a 58 y.o. male admitted with a medical diagnosis of Acute on chronic respiratory failure with hypoxia.  He presents with the following impairments/functional limitations:  weakness, gait instability, impaired cardiopulmonary response to activity, impaired endurance, impaired balance, impaired functional mobilty. Pt performed transfers SBA with RW and amb ~40ft SBA with RW, vc's for AD management; limited by SOB with O2 intact. Pt will benefit from skilled PT to improve deficits and increase overall functional mobility.     Rehab Prognosis:  Good; patient would benefit from acute skilled PT services to address these deficits and reach maximum level of function.      Recent Surgery: * No surgery found *      Plan:     During this hospitalization, patient to be seen 3 x/week to address the above listed problems via gait training, therapeutic activities, therapeutic exercises  · Plan of Care Expires:  10/28/18   Plan of Care Reviewed with: patient, mother    Subjective     Communicated with RN prior to session.  Patient found supine in bed upon PT entry to room, agreeable to evaluation.      Chief Complaint: SOB  Patient comments/goals: return home  Pain/Comfort:  · Pain Rating 1: 0/10  · Pain Rating Post-Intervention 1: 0/10    Patients cultural, spiritual, Christianity conflicts given the current situation:      Living Environment:  Pt lives with mother and sister in 1-story house with threshold GILDA and tub/shower. Pt reports (I) with ADLs and amb. Pt mother present and reports she is able to provide assist if needed.   Prior to admission, patients level of function was (I).  Patient has the following  equipment: shower chair.  DME owned (not currently used): none.  Upon discharge, patient will have assistance from family.    Objective:     Patient found with: telemetry, oxygen     General Precautions: Standard, fall   Orthopedic Precautions:N/A   Braces: N/A     Exams:  · Cognitive Exam:  Patient is oriented to Person, Place, Time and Situation  · Gross Motor Coordination:  WFL  · Postural Exam:  Patient presented with the following abnormalities:    · -       Rounded shoulders  · Sensation:    · -       Intact  light/touch B LE  · Skin Integrity/Edema:      · -       Skin integrity: Visible skin intact  · RLE ROM: WFL  · RLE Strength: WFL  · LLE ROM: WFL  · LLE Strength: WFL    Functional Mobility:  Transfers:     · Sit to Stand:  stand by assistance with rolling walker    Gait: ~40ft SBA with RW, vc's for AD management; limited by SOB with O2 intact     AM-PAC 6 CLICK MOBILITY  Total Score:18       Therapeutic Activities and Exercises:  Pt sat EOB with S.  Pt and mother educated on:  -role of PT/POC  -safety with mobility  -importance of OOB activity  -up in chair for majority of day for lung health  -use of RW for energy conservation and safety  -use of w/c for longer distances while building endurance  Pt safe to amb with RW with RN staff.   Pt and mother report no further questions or concerns from a mobility standpoint.     Patient left up in chair with all lines intact, call button in reach, RN notified and mother present.    GOALS:   Multidisciplinary Problems     Physical Therapy Goals        Problem: Physical Therapy Goal    Goal Priority Disciplines Outcome Goal Variances Interventions   Physical Therapy Goal     PT, PT/OT Ongoing (interventions implemented as appropriate)     Description:  Goals to be met by: 10/08/2018     Patient will increase functional independence with mobility by performin. Supine to sit with Modified [place]  2. Sit to supine with Modified [place]  3. Sit to  stand transfer with Supervision  4. Gait  x 150 feet with Supervision with or without appropriate AD.   5. Lower extremity exercise program x15 reps per handout, with supervision                      History:     Past Medical History:   Diagnosis Date    Cough     IPF (idiopathic pulmonary fibrosis)        History reviewed. No pertinent surgical history.    Clinical Decision Making:     Decision Making/ Complexity Score   On examination of body system using standardized tests and measures patient presents with 1-2 elements from any of the following: body structures and functions, activity limitations, and/or participation restrictions.  Leading to a clinical presentation that is considered stable and/or uncomplicated                              Clinical Decision Making  (Eval Complexity):  Low- 74154     Time Tracking:     PT Received On: 09/28/18  PT Start Time: 1016     PT Stop Time: 1043  PT Total Time (min): 27 min     Billable Minutes: Evaluation 17 and Therapeutic Activity 10      SHANNON HAMLIN, PT  09/28/2018

## 2018-09-28 NOTE — PROGRESS NOTES
MD with team 2 notified of + blood culture, gram stain aer bottle: gram positive cocci in clusters resembling staph.

## 2018-09-28 NOTE — ASSESSMENT & PLAN NOTE
This is a 57 y/o male with hx of ILD on home oxygen 6L at home, presented with increase base line SOB and oxygen requirements.  -- Exam: low sats 86% on 6 L, tachycardic (baseline tachycardic ~100), lung scatred rhonchi, L.L pitting edema.  -- Labs showed high BNP, high bicarb on CMP, CXR progression on previous patchy findings   -- CTA done 09/11 did not show PE.    DDx: ILD exacerbation 2/2 reaction from IVIG infusion, vs CHF exacerbation, considering PE in the differential.  1- Continue supplemental o2, Goal 88%, has been titrated down from 10L to 7L.  2- Lasix 40 mg IV TID -> titrated down to 40 mg IV BID today (09/28)  3- Duonebs Q6h  4- U/S b/l lower extremities -> No evidence of DVT  5- Continue home Ofev 150 mg q12 (day 113)..  6- strict In/out, daily weight    Pulmonology was consulted for assistance with care, as he is a pt of Dr. Gutierrez. In addition to continuing diuresis, they had recommended starting a short course of steroids with prednisone 40 mg PO daily x 5 days, which patient was started on yesterday, 09/27. Pt today now complaining of new photophobia that began yesterday: No visible eye changes or complaints on exam that point towards a clear cause.  - Will discontinue the steroids today, as patient is not in dire need of receiving them to improve clinically, and pulmonology acknowledges that this is the only new medication that was started yesterday and could be a side effect.  - NS ophthalmic solution for symptomatic relief  .

## 2018-09-28 NOTE — ASSESSMENT & PLAN NOTE
-- CP started in ED, heaviness on lest side of the chest, constant, not relived by nitroglycerin paste.  -- Trop 0.027 >>>> 0.019 on troponin #2  -- ECG no ST changes.

## 2018-09-28 NOTE — PLAN OF CARE
Problem: Occupational Therapy Goal  Goal: Occupational Therapy Goal  Goals to be met by: 10/12  Patient will increase functional independence with ADLs by performing:    LE Dressing with Modified [place].  Grooming while standing at sink with Supervision.  Toileting from toilet with Modified [place] for hygiene and clothing management.   Step transfer with Modified [place] and AD as needed to chair and to toilet.   Functional mobility with AD as needed for short household distance with no LOB with supervision.     Outcome: Ongoing (interventions implemented as appropriate)  OT inocencia completed. STACY Guzman 9/28/2018

## 2018-09-28 NOTE — PLAN OF CARE
Problem: Patient Care Overview  Goal: Plan of Care Review  Outcome: Ongoing (interventions implemented as appropriate)  VS and assessment performed per orders. Pt remains on oxygen high flow 7L/40%, sats >92%. Urine output as per intake/output flow sheet. NSR/ST on tele. Skin integrity unchanged. Pt free of falls or injury.

## 2018-09-28 NOTE — ASSESSMENT & PLAN NOTE
-- Echo 09/06 :(EF 60-65%), Right ventricular enlargement with severely depressed systolic function, PA systolic pressure is 90 mmHg.   -- due to group 3 PH.  -- On lasix 80 mg daily at home.

## 2018-09-28 NOTE — PLAN OF CARE
Problem: Physical Therapy Goal  Goal: Physical Therapy Goal  Goals to be met by: 10/08/2018     Patient will increase functional independence with mobility by performin. Supine to sit with Modified [place]  2. Sit to supine with Modified [place]  3. Sit to stand transfer with Supervision  4. Gait  x 150 feet with Supervision with or without appropriate AD.   5. Lower extremity exercise program x15 reps per handout, with supervision    Outcome: Ongoing (interventions implemented as appropriate)  Pt evaluation complete. Pt goals set.    SHANNON HAMLIN, PT  2018

## 2018-09-28 NOTE — PHARMACY MED REC
Admission Medication Reconciliation - Pharmacy Consult Note    The home medication history was taken by Kimi Roldan, Pharmacy Technician.      No issues noted with the medication reconciliation.      Ryan Go, PharmAntonioD., BCPS  [id number]                      .    .

## 2018-09-28 NOTE — PROGRESS NOTES
[facility]  Pulmonology  Progress Note    Patient Name: Patrick Dennis  MRN: [id number]  Admission Date: 9/26/2018  Hospital Length of Stay: 2 days  Code Status: Full Code  Attending Provider: Kim Sheppard MD  Primary Care Provider: No primary care provider on file.   Principal Problem: Acute on chronic respiratory failure with hypoxia    Subjective:     Interval History: Pt reports considerable improvement in shortness of breath. He is on 7 L O2. This morning, he reports photosensitivity that started yesterday. Denies eye pain and vision changes. Also denies HA and neck pain/stiffness. Medication list reviewed for potential side effects.    Objective:     Vital Signs (Most Recent):  Temp: 97.5 °F (36.4 °C) (09/28/18 1118)  Pulse: 107 (09/28/18 1210)  Resp: 18 (09/28/18 1210)  BP: (!) 165/91 (09/28/18 0816)  SpO2: (!) 93 % (09/28/18 1210) Vital Signs (24h Range):  Temp:  [97.3 °F (36.3 °C)-98.5 °F (36.9 °C)] 97.5 °F (36.4 °C)  Pulse:  [] 107  Resp:  [14-32] 18  SpO2:  [90 %-98 %] 93 %  BP: (113-165)/(79-91) 165/91     Weight: 114.8 kg (253 lb 1.4 oz)  Body mass index is 36.31 kg/m².      Intake/Output Summary (Last 24 hours) at 9/28/2018 1222  Last data filed at 9/28/2018 0700  Gross per 24 hour   Intake --   Output 2725 ml   Net -2725 ml       Physical Exam   Constitutional: He is oriented to person, place, and time. He appears well-developed. No distress.   HENT:   Head: Normocephalic and atraumatic.   Mouth/Throat: Oropharynx is clear and moist.   Eyes: Conjunctivae and EOM are normal. Pupils are equal, round, and reactive to light. Left eye exhibits no discharge.   Neck: Normal range of motion. Neck supple.   Cardiovascular: Normal rate.   Pulmonary/Chest: No respiratory distress. He has decreased breath sounds. He has rales. He exhibits no tenderness.   On 7 L O2   Abdominal: Soft. There is no tenderness.   Musculoskeletal: He exhibits edema. He exhibits no deformity.  "  Lymphadenopathy:     He has no cervical adenopathy.   Neurological: He is alert and oriented to person, place, and time.   Skin: Skin is warm and dry.   dominic, nonblanching discoloration in feet   Psychiatric: He has a normal mood and affect.       Vents:  Oxygen Concentration (%): 40 (09/28/18 0305)    Lines/Drains/Airways     Peripheral Intravenous Line                 Peripheral IV - Single Lumen 05/29/18 0839 Left Forearm 122 days         Peripheral IV - Single Lumen 09/26/18 1714 Left Antecubital 1 day                Significant Labs:    CBC/Anemia Profile:  Recent Labs   Lab  09/26/18   1727  09/27/18   0642  09/28/18   0616   WBC  6.37  3.08*  8.87   HGB  15.6  13.6*  15.3   HCT  51.7  43.8  52.1   PLT  129*  128*  157   MCV  92  91  95   RDW  13.2  13.3  13.6        Chemistries:  Recent Labs   Lab  09/26/18   1727   09/27/18   0642  09/27/18   1552  09/27/18   2045  09/28/18   0616  09/28/18   0748   NA  138   --   134*  136  136   --   139   K  4.7   --   4.3  4.4  4.2   --   5.5*   CL  90*   --   90*  88*  89*   --   87*   CO2  37*   --   35*  40*  39*   --   42*   BUN  17   --   19  19  20   --   19   CREATININE  1.1   --   0.9  1.0  0.9   --   0.9   CALCIUM  10.2   --   9.6  10.2  9.8   --   10.0   ALBUMIN  3.6   --   3.4*   --    --    --    --    PROT  8.5*   --   7.8   --    --    --    --    BILITOT  1.5*   --   1.5*   --    --    --    --    ALKPHOS  80   --   70   --    --    --    --    ALT  19   --   18   --    --    --    --    AST  26   --   23   --    --    --    --    MG   --    < >  1.6  2.0  1.9   --   2.0   PHOS   --    --   4.5   --    --   4.7*   --     < > = values in this interval not displayed.       All pertinent labs within the past 24 hours have been reviewed.    Significant Imaging:  I have reviewed all pertinent imaging results/findings within the past 24 hours.    Assessment/Plan:     * Acute on chronic respiratory failure with hypoxia    See "ILD (interstitial lung " "disease)"        ILD (interstitial lung disease)    ACUTE ON CHRONIC RESPIRATORY FAILURE  COR PULMONALE  PULMONARY HYPERTENSION    Mr. Dennis is a 58/male with ILD (on 6 L O2 at home), Pulm HTN, & Cor Pulmonale who presents for acute on chronic respiratory failure. He has advanced pulm disease and was evaluated for transplant by Dr. Monique on 9/17 (not a lung transplant candidate at this time due to debility and obesity). Pt's symptoms have improved after receiving steroids, diuretics, and Duo-nebs. There are no overt signs of an infection precipitating respiratory failure. Suspect acute on chronic hypoxemic and hypercapnic respiratory failure more multifactorial Pt does appear to be fluid overloaded, so diuresis should be continued. He would benefit from physical reconditioning and weight loss.  Today, pt reports new onset photosensitivity. He denies any other new symptoms (eye pain, vision changes, HA, neck pain/stiffness).    Recommendations:  - Discontinue steroids. Uncertain if steroids are contributing to his new onset photosensitivity but this was the only new medication administered.  Although pt has advanced ILD, his symptoms appear more related to volume overload rather than exacerbation of underlying lung disease. Additionally, steroids has not helped him in the past.   - Consider ophthalmology consult if symptoms do not improve  - Pulmonary rehab on discharge. Pt lives near [facility].   - Pt would benefit from physical reconditioning and weight loss. His debility and obesity are barriers to lung transplant at this time.   - Wean supplemental O2 to his home regimen (6 - 7 L O2), with SpO2 goal 88%  - Would hold IVIG at this time because the increased volume could worsen respiratory status. For further IVIG administrations, patient may benefit from taking additional lasix dose with IVIG.    - Agree with continuing diuretics   - Agree with continue Ofev and bronchodilators   - Follow up respiratory " "culture results          Pulmonary hypertension, unspecified    See "ILD (interstitial lung disease)"        Cor pulmonale    See "ILD (interstitial lung disease)"        IPF (idiopathic pulmonary fibrosis)    See "ILD (interstitial lung disease)"               Larisa De Leon MD  Pulmonology  [facility]    "

## 2018-09-28 NOTE — SUBJECTIVE & OBJECTIVE
Interval History: Pt reports considerable improvement in shortness of breath. He is on 7 L O2. This morning, he reports photosensitivity that started yesterday. Denies eye pain and vision changes. Also denies HA and neck pain/stiffness. Medication list reviewed for potential side effects.    Objective:     Vital Signs (Most Recent):  Temp: 97.5 °F (36.4 °C) (09/28/18 1118)  Pulse: 107 (09/28/18 1210)  Resp: 18 (09/28/18 1210)  BP: (!) 165/91 (09/28/18 0816)  SpO2: (!) 93 % (09/28/18 1210) Vital Signs (24h Range):  Temp:  [97.3 °F (36.3 °C)-98.5 °F (36.9 °C)] 97.5 °F (36.4 °C)  Pulse:  [] 107  Resp:  [14-32] 18  SpO2:  [90 %-98 %] 93 %  BP: (113-165)/(79-91) 165/91     Weight: 114.8 kg (253 lb 1.4 oz)  Body mass index is 36.31 kg/m².      Intake/Output Summary (Last 24 hours) at 9/28/2018 1222  Last data filed at 9/28/2018 0700  Gross per 24 hour   Intake --   Output 2725 ml   Net -2725 ml       Physical Exam   Constitutional: He is oriented to person, place, and time. He appears well-developed. No distress.   HENT:   Head: Normocephalic and atraumatic.   Mouth/Throat: Oropharynx is clear and moist.   Eyes: Conjunctivae and EOM are normal. Pupils are equal, round, and reactive to light. Left eye exhibits no discharge.   Neck: Normal range of motion. Neck supple.   Cardiovascular: Normal rate.   Pulmonary/Chest: No respiratory distress. He has decreased breath sounds. He has rales. He exhibits no tenderness.   On 7 L O2   Abdominal: Soft. There is no tenderness.   Musculoskeletal: He exhibits edema. He exhibits no deformity.   Lymphadenopathy:     He has no cervical adenopathy.   Neurological: He is alert and oriented to person, place, and time.   Skin: Skin is warm and dry.   dominic, nonblanching discoloration in feet   Psychiatric: He has a normal mood and affect.       Vents:  Oxygen Concentration (%): 40 (09/28/18 0305)    Lines/Drains/Airways     Peripheral Intravenous Line                 Peripheral IV -  Single Lumen 05/29/18 0839 Left Forearm 122 days         Peripheral IV - Single Lumen 09/26/18 1714 Left Antecubital 1 day                Significant Labs:    CBC/Anemia Profile:  Recent Labs   Lab  09/26/18   1727  09/27/18   0642  09/28/18   0616   WBC  6.37  3.08*  8.87   HGB  15.6  13.6*  15.3   HCT  51.7  43.8  52.1   PLT  129*  128*  157   MCV  92  91  95   RDW  13.2  13.3  13.6        Chemistries:  Recent Labs   Lab  09/26/18   1727   09/27/18   0642  09/27/18   1552  09/27/18   2045  09/28/18   0616  09/28/18   0748   NA  138   --   134*  136  136   --   139   K  4.7   --   4.3  4.4  4.2   --   5.5*   CL  90*   --   90*  88*  89*   --   87*   CO2  37*   --   35*  40*  39*   --   42*   BUN  17   --   19  19  20   --   19   CREATININE  1.1   --   0.9  1.0  0.9   --   0.9   CALCIUM  10.2   --   9.6  10.2  9.8   --   10.0   ALBUMIN  3.6   --   3.4*   --    --    --    --    PROT  8.5*   --   7.8   --    --    --    --    BILITOT  1.5*   --   1.5*   --    --    --    --    ALKPHOS  80   --   70   --    --    --    --    ALT  19   --   18   --    --    --    --    AST  26   --   23   --    --    --    --    MG   --    < >  1.6  2.0  1.9   --   2.0   PHOS   --    --   4.5   --    --   4.7*   --     < > = values in this interval not displayed.       All pertinent labs within the past 24 hours have been reviewed.    Significant Imaging:  I have reviewed all pertinent imaging results/findings within the past 24 hours.

## 2018-09-28 NOTE — ASSESSMENT & PLAN NOTE
ACUTE ON CHRONIC RESPIRATORY FAILURE  COR PULMONALE  PULMONARY HYPERTENSION    Mr. Dennis is a 58/male with ILD (on 6 L O2 at home), Pulm HTN, & Cor Pulmonale who presents for acute on chronic respiratory failure. He has advanced pulm disease and was evaluated for transplant by Dr. Monique on 9/17 (not a lung transplant candidate at this time due to debility and obesity). Pt's symptoms have improved after receiving steroids, diuretics, and Duo-nebs. There are no overt signs of an infection precipitating respiratory failure. Suspect acute on chronic hypoxemic and hypercapnic respiratory failure more multifactorial Pt does appear to be fluid overloaded, so diuresis should be continued. He would benefit from physical reconditioning and weight loss.  Today, pt reports new onset photosensitivity. He denies any other new symptoms (eye pain, vision changes, HA, neck pain/stiffness).    Recommendations:  - Discontinue steroids. Uncertain if steroids are contributing to his new onset photosensitivity but this was the only new medication administered.  Although pt has advanced ILD, his symptoms appear more related to volume overload rather than exacerbation of underlying lung disease. Additionally, steroids has not helped him in the past.   - Consider ophthalmology consult if symptoms do not improve  - Pulmonary rehab on discharge. Pt lives near [facility].   - Pt would benefit from physical reconditioning and weight loss. His debility and obesity are barriers to lung transplant at this time.   - Wean supplemental O2 to his home regimen (6 - 7 L O2), with SpO2 goal 88%  - Would hold IVIG at this time because the increased volume could worsen respiratory status. For further IVIG administrations, patient may benefit from taking additional lasix dose with IVIG.    - Agree with continuing diuretics   - Agree with continue Ofev and bronchodilators   - Follow up respiratory culture results

## 2018-09-28 NOTE — PLAN OF CARE
CM discussed with patient and Family H/H recommendations. Pt and family are sure that with Pulmonary rehab he will be able to breath better, so that he is able to ambulate better, so they are not currently interested in H/H. They are interested in the DME, but they are wishing to contact their [facility]  to see if they are able to deliver and bill for it vs the [facility] CM working on it.

## 2018-09-28 NOTE — PROGRESS NOTES
[facility] Hospital Medicine  Progress Note    Patient Name: Patrick Dennis  MRN: [id number]  Patient Class: IP- Inpatient   Admission Date: 9/26/2018  Length of Stay: 2 days  Attending Physician: Kim Sheppard MD  Primary Care Provider: No primary care provider on file.    Hospital Medicine Team: [facility] [place] [facility] MED 2 Alejandro Walter MD    Subjective:     Principal Problem:Acute on chronic respiratory failure with hypoxia    HPI:  This is a 58 year old male with history of pulmonary HTN and ILD (on home oxygen 6 L)   presents with 3-4 days of progressively worsening dyspnea,He states that he frequently monitors his pulse ox at home and has noticed that he has been desatting to the 60%s with any ambulation with 6L O2 and has been increasing to 8 L. He received his IVIG 09/25. He has chronic lower extremities swelling and state that it did not worsen.    Denies any fevers, chills, N/V, change in bowel habits. No hx of dysuria, hematuria or decreased in UOP.        Hospital Course:  In ED 09/26, pt was given lasix 40 mg IVx1, solumedrol 125 mg IVx1, duonebs and supplemental o2, admitted to hospital medicine from acute on chronic respiratory failure, Patient was started on nebulized breathing treatments q6h and began aggressive diuresis with 40 mg IV Lasix TID. Patient responded adequately to this regimen with good UOP (~6 L over 36 hours -> 1.45 mg/kg/hr). Patient was originally placed on 10 liters O2 via high-flow NC but has clinically improved with diuresis and is now close to his home O2 requirements of 6 liters.     Interval History: No acute events overnight. Pt feels well this morning. He adequately responded to aggressive diuresis with good UOP (4 L over last 24 hrs). Patient is resting comfortably in bed, currently on 7 L O2 via NC. Patient had 1/2 blood cultures drawn on 09/26 come back positive for Gram + cocci In clusters (staph), likely a contaminant. He is afebrile, no leukocytosis  noted on CBC, vital signs stable.    Review of Systems   Constitutional: Positive for fatigue. Negative for chills and fever.   HENT: Negative for congestion and sore throat.    Eyes: Positive for photophobia.   Respiratory: Positive for shortness of breath. Negative for cough.    Cardiovascular: Positive for chest pain, palpitations and leg swelling.   Gastrointestinal: Negative for abdominal pain, diarrhea, nausea and vomiting.   Genitourinary: Negative for decreased urine volume, dysuria and hematuria.   Musculoskeletal: Negative for arthralgias and myalgias.   Neurological: Negative for syncope, light-headedness and headaches.   Psychiatric/Behavioral: Negative for agitation.     Objective:     Vital Signs (Most Recent):  Temp: 97.5 °F (36.4 °C) (09/28/18 1118)  Pulse: 110 (09/28/18 1115)  Resp: (!) 26 (09/28/18 0816)  BP: (!) 165/91 (09/28/18 0816)  SpO2: (!) 90 % (09/28/18 0816) Vital Signs (24h Range):  Temp:  [97.3 °F (36.3 °C)-98.5 °F (36.9 °C)] 97.5 °F (36.4 °C)  Pulse:  [] 110  Resp:  [14-32] 26  SpO2:  [90 %-98 %] 90 %  BP: (113-165)/(79-91) 165/91     Weight: 114.8 kg (253 lb 1.4 oz)  Body mass index is 36.31 kg/m².    Intake/Output Summary (Last 24 hours) at 9/28/2018 1134  Last data filed at 9/28/2018 0700  Gross per 24 hour   Intake --   Output 3625 ml   Net -3625 ml      Physical Exam   Constitutional: He is oriented to person, place, and time. He appears well-developed. No distress.   HENT:   Head: Normocephalic and atraumatic.   Mouth/Throat: Oropharynx is clear and moist.   Eyes: Conjunctivae are normal.   Neck: Normal range of motion. Neck supple.   Cardiovascular: Normal rate, regular rhythm and normal heart sounds.   Pulmonary/Chest: Accessory muscle usage present. No tachypnea. He has decreased breath sounds (globallly). He has wheezes. He has rales (improved from yesterday, sounding more dry) in the right lower field and the left lower field. He exhibits no tenderness.   On 7 L,  comfort flow  Digital clubbing   Abdominal: Soft. There is no tenderness.   Musculoskeletal: He exhibits edema (bilateral lower limbs). He exhibits no tenderness or deformity.   Lymphadenopathy:     He has no cervical adenopathy.   Neurological: He is alert and oriented to person, place, and time.   Skin: Skin is warm and dry. There is erythema (dominic, nonblanching discoloration in feet).   Psychiatric: He has a normal mood and affect.   Nursing note and vitals reviewed.      Significant Labs:   Recent Lab Results       09/28/18  0814 09/28/18  0748 09/28/18  0616 09/27/18  2149 09/27/18  2045      Immature Granulocytes   0.3       Immature Grans (Abs)   0.03  Comment:  Mild elevation in immature granulocytes is non specific and   can be seen in a variety of conditions including stress response,   acute inflammation, trauma and pregnancy. Correlation with other   laboratory and clinical findings is essential.         Anion Gap  10   8     Baso #   0.01       Basophil%   0.1       BUN, Bld  19   20     Calcium  10.0   9.8     Chloride  87   89     CO2  42  Comment:  *Critical value -   Results called to and read back by:LASHAWN ARIZMENDI   39     Creatinine  0.9   0.9     Differential Method   Automated       eGFR if   >60.0   >60.0     eGFR if non   >60.0  Comment:  Calculation used to obtain the estimated glomerular filtration  rate (eGFR) is the CKD-EPI equation.      >60.0  Comment:  Calculation used to obtain the estimated glomerular filtration  rate (eGFR) is the CKD-EPI equation.        Eos #   0.0       Eosinophil%   0.0       Glucose  97   175     Gran # (ANC)   6.6       Gran%   74.9       Hematocrit   52.1       Hemoglobin   15.3       Lymph #   1.3       Lymph%   14.9       Magnesium  2.0   1.9     MCH   27.8       MCHC   29.4       MCV   95       Mono #   0.9       Mono%   9.8       MPV   10.3       nRBC   0       Phosphorus   4.7       Platelets   157       POCT Glucose 93    104      Potassium  5.5  Comment:  *No Visible Hemolysis   4.2     RBC   5.51       RDW   13.6       Sodium  139   136     WBC   8.87                   09/27/18  1754 09/27/18  1552 09/27/18  1211      Immature Granulocytes        Immature Grans (Abs)        Anion Gap  8      Baso #        Basophil%        BUN, Bld  19      Calcium  10.2      Chloride  88      CO2  40      Creatinine  1.0      Differential Method        eGFR if   >60.0      eGFR if non   >60.0  Comment:  Calculation used to obtain the estimated glomerular filtration  rate (eGFR) is the CKD-EPI equation.         Eos #        Eosinophil%        Glucose  132      Gran # (ANC)        Gran%        Hematocrit        Hemoglobin        Lymph #        Lymph%        Magnesium  2.0      MCH        MCHC        MCV        Mono #        Mono%        MPV        nRBC        Phosphorus        Platelets        POCT Glucose 141  116     Potassium  4.4      RBC        RDW        Sodium  136      WBC              Significant Imaging: I have reviewed all pertinent imaging results/findings within the past 24 hours.    Assessment/Plan:      * Acute on chronic respiratory failure with hypoxia    This is a 59 y/o male with hx of ILD on home oxygen 6L at home, presented with increase base line SOB and oxygen requirements.  -- Exam: low sats 86% on 6 L, tachycardic (baseline tachycardic ~100), lung scatred rhonchi, L.L pitting edema.  -- Labs showed high BNP, high bicarb on CMP, CXR progression on previous patchy findings   -- CTA done 09/11 did not show PE.    DDx: ILD exacerbation 2/2 reaction from IVIG infusion, vs CHF exacerbation, considering PE in the differential.  1- Continue supplemental o2, Goal 88%, has been titrated down from 10L to 7L.  2- Lasix 40 mg IV TID -> titrated down to 40 mg IV BID today (09/28)  3- Duonebs Q6h  4- U/S b/l lower extremities -> No evidence of DVT  5- Continue home Ofev 150 mg q12 (day 113)..  6- strict  In/out, daily weight    Pulmonology was consulted for assistance with care, as he is a pt of Dr. Oliver's. In addition to continuing diuresis, they had recommended starting a short course of steroids with prednisone 40 mg PO daily x 5 days, which patient was started on yesterday, 09/27. Pt today now complaining of new photophobia that began yesterday: No visible eye changes or complaints on exam that point towards a clear cause.  - Will discontinue the steroids today, as patient is not in dire need of receiving them to improve clinically, and pulmonology acknowledges that this is the only new medication that was started yesterday and could be a side effect.  - NS ophthalmic solution for symptomatic relief  .          ILD (interstitial lung disease)    -- In regard to therapies, he was initially started on prednisone in [place] with essentially no change in disease course. He was later seen by Dr. Solorio in the [facility] and was started on Cellcept about a year ago for several months (stopped in July). This too had little impact on his disease trajectory. He was placed on Ofev in May of 2018 and may have noticed some decrease in his cough, however overall no significant change in his dyspnea or progression. He has also been following with an allergist, who had recommend a trial of IVIG infusions -- although it is not clear these have improved or delayed his progress, he does report feeling better typically a few days after receiving the infusions.   -- Pt of Dr. Oliver, last seen 09/06  -- Currently on Ofev (Day 113), and IVIG (last dose 09/25).  -- Last PFT 09/17:   * FEV1/FVC ratio 84%          Edema, lower extremity    - Bilateral LE edema improving with current diuresis regimen  - Continue on Lasix 40 mg IV BID          Hypoxia              Pulmonary hypertension, unspecified    -- Echo 09/06 :  ** PA systolic pressure is 90 mmHg.           Cor pulmonale    -- Echo 09/06 :(EF 60-65%), Right ventricular  "enlargement with severely depressed systolic function, PA systolic pressure is 90 mmHg.   -- due to group 3 PH.  -- On lasix 80 mg daily at home.        Lung transplant candidate    -- Saw Dr. Monique 09/17, Per Note " at the present time he would not be a suitable candidate for lung transplant to his debility and his obesity. Offered him a re-evaluation in 6 months time, which gives him the opportunity to pursue pulmonary rehab to improve his exercise tolerance and to continue his weight loss efforts, ( goal of another 40-50 lbs). referral to bariatric surgery for medical therapies Goal to least 800 ft on a 6 mwt.)  -- six mwt 09/17 = 50 ft.           IPF (idiopathic pulmonary fibrosis)    -- Allergen testing in addition to a basic rheumatologist work up including JOSH, ANCA, SCL-70, SSA and SSB were all unremarkable            VTE Risk Mitigation (From admission, onward)        Ordered     enoxaparin injection 40 mg  Daily      09/26/18 1918     IP VTE HIGH RISK PATIENT  Once      09/26/18 1917              Alejandro Walter MD  Department of Hospital Medicine   [facility]  "

## 2018-09-28 NOTE — ASSESSMENT & PLAN NOTE
-- In regard to therapies, he was initially started on prednisone in [place] with essentially no change in disease course. He was later seen by Dr. Solorio in the [facility] and was started on Cellcept about a year ago for several months (stopped in July). This too had little impact on his disease trajectory. He was placed on Ofev in May of 2018 and may have noticed some decrease in his cough, however overall no significant change in his dyspnea or progression. He has also been following with an allergist, who had recommend a trial of IVIG infusions -- although it is not clear these have improved or delayed his progress, he does report feeling better typically a few days after receiving the infusions.   -- Pt of Dr. Oliver, last seen 09/06  -- Currently on Ofev (Day 113), and IVIG (last dose 09/25).  -- Last PFT 09/17:   * FEV1/FVC ratio 84%

## 2018-09-28 NOTE — SUBJECTIVE & OBJECTIVE
Interval History: No acute events overnight. Pt feels well this morning. He adequately responded to aggressive diuresis with good UOP (4 L over last 24 hrs). Patient is resting comfortably in bed, currently on 7 L O2 via NC. Patient had 1/2 blood cultures drawn on 09/26 come back positive for Gram + cocci In clusters (staph), likely a contaminant. He is afebrile, no leukocytosis noted on CBC, vital signs stable.    Review of Systems   Constitutional: Positive for fatigue. Negative for chills and fever.   HENT: Negative for congestion and sore throat.    Eyes: Positive for photophobia.   Respiratory: Positive for shortness of breath. Negative for cough.    Cardiovascular: Positive for chest pain, palpitations and leg swelling.   Gastrointestinal: Negative for abdominal pain, diarrhea, nausea and vomiting.   Genitourinary: Negative for decreased urine volume, dysuria and hematuria.   Musculoskeletal: Negative for arthralgias and myalgias.   Neurological: Negative for syncope, light-headedness and headaches.   Psychiatric/Behavioral: Negative for agitation.     Objective:     Vital Signs (Most Recent):  Temp: 97.5 °F (36.4 °C) (09/28/18 1118)  Pulse: 110 (09/28/18 1115)  Resp: (!) 26 (09/28/18 0816)  BP: (!) 165/91 (09/28/18 0816)  SpO2: (!) 90 % (09/28/18 0816) Vital Signs (24h Range):  Temp:  [97.3 °F (36.3 °C)-98.5 °F (36.9 °C)] 97.5 °F (36.4 °C)  Pulse:  [] 110  Resp:  [14-32] 26  SpO2:  [90 %-98 %] 90 %  BP: (113-165)/(79-91) 165/91     Weight: 114.8 kg (253 lb 1.4 oz)  Body mass index is 36.31 kg/m².    Intake/Output Summary (Last 24 hours) at 9/28/2018 1134  Last data filed at 9/28/2018 0700  Gross per 24 hour   Intake --   Output 3625 ml   Net -3625 ml      Physical Exam   Constitutional: He is oriented to person, place, and time. He appears well-developed. No distress.   HENT:   Head: Normocephalic and atraumatic.   Mouth/Throat: Oropharynx is clear and moist.   Eyes: Conjunctivae are normal.   Neck:  Normal range of motion. Neck supple.   Cardiovascular: Normal rate, regular rhythm and normal heart sounds.   Pulmonary/Chest: Accessory muscle usage present. No tachypnea. He has decreased breath sounds (globallly). He has wheezes. He has rales (improved from yesterday, sounding more dry) in the right lower field and the left lower field. He exhibits no tenderness.   On 7 L, comfort flow  Digital clubbing   Abdominal: Soft. There is no tenderness.   Musculoskeletal: He exhibits edema (bilateral lower limbs). He exhibits no tenderness or deformity.   Lymphadenopathy:     He has no cervical adenopathy.   Neurological: He is alert and oriented to person, place, and time.   Skin: Skin is warm and dry. There is erythema (dominic, nonblanching discoloration in feet).   Psychiatric: He has a normal mood and affect.   Nursing note and vitals reviewed.      Significant Labs:   Recent Lab Results       09/28/18  0814 09/28/18  0748 09/28/18  0616 09/27/18  2149 09/27/18  2045      Immature Granulocytes   0.3       Immature Grans (Abs)   0.03  Comment:  Mild elevation in immature granulocytes is non specific and   can be seen in a variety of conditions including stress response,   acute inflammation, trauma and pregnancy. Correlation with other   laboratory and clinical findings is essential.         Anion Gap  10   8     Baso #   0.01       Basophil%   0.1       BUN, Bld  19   20     Calcium  10.0   9.8     Chloride  87   89     CO2  42  Comment:  *Critical value -   Results called to and read back by:LASHAWN ARIZMENDI   39     Creatinine  0.9   0.9     Differential Method   Automated       eGFR if   >60.0   >60.0     eGFR if non   >60.0  Comment:  Calculation used to obtain the estimated glomerular filtration  rate (eGFR) is the CKD-EPI equation.      >60.0  Comment:  Calculation used to obtain the estimated glomerular filtration  rate (eGFR) is the CKD-EPI equation.        Eos #   0.0        Eosinophil%   0.0       Glucose  97   175     Gran # (ANC)   6.6       Gran%   74.9       Hematocrit   52.1       Hemoglobin   15.3       Lymph #   1.3       Lymph%   14.9       Magnesium  2.0   1.9     MCH   27.8       MCHC   29.4       MCV   95       Mono #   0.9       Mono%   9.8       MPV   10.3       nRBC   0       Phosphorus   4.7       Platelets   157       POCT Glucose 93   104      Potassium  5.5  Comment:  *No Visible Hemolysis   4.2     RBC   5.51       RDW   13.6       Sodium  139   136     WBC   8.87                   09/27/18  1754 09/27/18  1552 09/27/18  1211      Immature Granulocytes        Immature Grans (Abs)        Anion Gap  8      Baso #        Basophil%        BUN, Bld  19      Calcium  10.2      Chloride  88      CO2  40      Creatinine  1.0      Differential Method        eGFR if   >60.0      eGFR if non   >60.0  Comment:  Calculation used to obtain the estimated glomerular filtration  rate (eGFR) is the CKD-EPI equation.         Eos #        Eosinophil%        Glucose  132      Gran # (ANC)        Gran%        Hematocrit        Hemoglobin        Lymph #        Lymph%        Magnesium  2.0      MCH        MCHC        MCV        Mono #        Mono%        MPV        nRBC        Phosphorus        Platelets        POCT Glucose 141  116     Potassium  4.4      RBC        RDW        Sodium  136      WBC              Significant Imaging: I have reviewed all pertinent imaging results/findings within the past 24 hours.

## 2018-09-28 NOTE — PT/OT/SLP EVAL
Occupational Therapy   Evaluation    Name: Patrick Dennis  MRN: [id number]  Admitting Diagnosis:  Acute on chronic respiratory failure with hypoxia      Recommendations:     Discharge Recommendations: home with home health(pulmonary rehab)  Discharge Equipment Recommendations:  walker, rolling, wheelchair  Barriers to discharge:  None    History:     Occupational Profile:  Living Environment: Pt lives with mother in [facility], 1 step to enter. Tub/shower combo with seat.   Previous level of function: Pt was independent with ADL and IADL. No longer drives. 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   Roles and Routines: caretaker to self and home, son, community dweller  Equipment Used at Home:  shower chair  Assistance upon Discharge: assist from mom and sisters    Past Medical History:   Diagnosis Date    Cough     IPF (idiopathic pulmonary fibrosis)        History reviewed. No pertinent surgical history.    Subjective     Chief Complaint: SOB   Patient/Family Comments/goals: to get better    Pain/Comfort:  · Pain Rating 1: 0/10    Patients cultural, spiritual, Hoahaoism conflicts given the current situation: none reported     Objective:     Communicated with: RN prior to session.  Patient found all lines intact, call button in reach and mom present and telemetry, oxygen upon OT entry to room. Eval completed with PT.     General Precautions: Standard, fall, aspiration, respiratory   Orthopedic Precautions:N/A   Braces: N/A     Occupational Performance:    Bed Mobility:    · Patient completed Rolling/Turning to Right with supervision  · Patient completed Scooting/Bridging with supervision  · Patient completed Supine to Sit with supervision    Functional Mobility/Transfers:  · Patient completed Sit <> Stand Transfer with stand by assistance  with  rolling walker from EOB   · Functional Mobility: SBA with RW for long household distance in hallway x1 trial, required seated rest break at end      Activities of Daily Living:  · Feeding:  independence for breakfast tray and use of all utensils  · Lower Body Dressing: maximal assistance to don shoes due to SOB     Cognitive/Visual Perceptual:  Cognitive/Psychosocial Skills:     -       Oriented to: Person, Place, Time and Situation   -       Follows Commands/attention:Follows multistep  commands  -       Communication: clear/fluent  -       Memory: No Deficits noted  -       Safety awareness/insight to disability: intact   -       Mood/Affect/Coping skills/emotional control: Appropriate to situation  Visual/Perceptual:      -Intact --stated he is experiencing light sensitivity today and MD is aware    Physical Exam:  Postural examination/scapula alignment:    -       Rounded shoulders  -       Posterior pelvic tilt  Skin integrity: Visible skin intact  Edema:  None noted  Sensation:    -       Intact BUE  Motor Planning:    -       intact   Dominant hand:    -       R  Upper Extremity Range of Motion:   -       Right Upper Extremity: WFL  -       Left Upper Extremity: WFL   Upper Extremity Strength:    -       Right Upper Extremity: WFL  -       Left Upper Extremity: WFL    Strength:   -       Right Upper Extremity: WFL  -       Left Upper Extremity: WFL   Fine Motor Coordination:    -       Intact  Left hand thumb/finger opposition skills, Right hand thumb/finger opposition skills, Left hand, manipulation of objects and Right hand, manipulation of objects    AMPAC 6 Click ADL:  AMPAC Total Score: 21    Treatment & Education:  -Edu for OT role and POC  -Edu for importance of OOB activity and impact on healing  -Edu for energy conservation in the home and with ADL tasks  -Edu for role of pulmonary rehab and benefits   -Edu for use of WC for community mobility and as seated rest break place while in rehab process   -Whiteboard updated   -Questions and concerns addressed within OT scope of practice   Education:    Patient left up in chair with all lines  "intact, call button in reach and mother present    Assessment:     Patrick Dennis is a 58 y.o. male with a medical diagnosis of Acute on chronic respiratory failure with hypoxia.  He presents with the following performance deficits affecting function: weakness, impaired endurance, impaired self care skills, impaired functional mobilty, impaired balance, gait instability, impaired cardiopulmonary response to activity. He demo decreased functional independence in various areas of his life. Pt demo decreased endurance to functional task. Pt is limited by SOB at this time, impacting his functional mobility and ADL skills. Pt would continue to benefit from skilled OT services for maximum functional outcome and safety.    Rehab Prognosis: Good; patient would benefit from acute skilled OT services to address these deficits and reach maximum level of function.         Clinical Decision Makin.  OT Low:  "Pt evaluation falls under low complexity for evaluation coding due to performance deficits noted in 1-3 areas as stated above and 0 co-morbities affecting current functional status. Data obtained from problem focused assessments. No modifications or assistance was required for completion of evaluation. Only brief occupational profile and history review completed."     Plan:     Patient to be seen 3 x/week to address the above listed problems via self-care/home management, therapeutic activities, therapeutic exercises  · Plan of Care Expires: 10/27/18  · Plan of Care Reviewed with: patient, mother    This Plan of care has been discussed with the patient who was involved in its development and understands and is in agreement with the identified goals and treatment plan    GOALS:   Multidisciplinary Problems     Occupational Therapy Goals        Problem: Occupational Therapy Goal    Goal Priority Disciplines Outcome Interventions   Occupational Therapy Goal     OT, PT/OT Ongoing (interventions implemented as " appropriate)    Description:  Goals to be met by: 10/12  Patient will increase functional independence with ADLs by performing:    LE Dressing with Modified [place].  Grooming while standing at sink with Supervision.  Toileting from toilet with Modified [place] for hygiene and clothing management.   Step transfer with Modified [place] and AD as needed to chair and to toilet.   Functional mobility with AD as needed for short household distance with no LOB with supervision.                       Time Tracking:     OT Date of Treatment: 09/28/18  OT Start Time: 1013  OT Stop Time: 1043  OT Total Time (min): 30 min    Billable Minutes:Evaluation 20  Therapeutic Activity 10    STACY Guzman  9/28/2018

## 2018-09-29 NOTE — ASSESSMENT & PLAN NOTE
This is a 57 y/o male with hx of ILD on home oxygen 6L at home, presented with increase base line SOB and oxygen requirements.  -- Exam: low sats 86% on 6 L, tachycardic (baseline tachycardic ~100), lung scatred rhonchi, L.L pitting edema.  -- Labs showed high BNP, high bicarb on CMP, CXR progression on previous patchy findings   -- CTA done 09/11 did not show PE.    DDx: ILD exacerbation 2/2 reaction from IVIG infusion, vs CHF exacerbation, considering PE in the differential.  1- Continue supplemental o2, Goal 88%, has been titrated down from 10L to 7L.  2- Lasix 40 mg IV TID -> titrated down to 40 mg IV BID today (09/28)  3- Duonebs Q6h  4- U/S b/l lower extremities -> No evidence of DVT  5- Continue home Ofev 150 mg q12 (day 113)..  6- strict In/out, daily weight    Pulmonology was consulted for assistance with care, as he is a pt of Dr. Gutierrez. In addition to continuing diuresis, they had recommended starting a short course of steroids with prednisone 40 mg PO daily x 5 days, which patient was started on yesterday, 09/27. Pt today now complaining of new photophobia that began yesterday: No visible eye changes or complaints on exam that point towards a clear cause.  - Pt's photophobia improving today, seems to be resolving on it's own.   - Currently on 80 mg Lasix PO BID, plan to cut down to 80 mg PO qd upon discharge.  - Will set up ambulatory referral to pulmonary rehab

## 2018-09-29 NOTE — ASSESSMENT & PLAN NOTE
-- CP started in ED, heaviness on lest side of the chest, constant, not relived by nitroglycerin paste.  -- Trop 0.027 >>>> 0.019 on troponin #2  -- ECG no ST changes.    Patient with recurrence of chest pain today, Intermittent, sharp left-sided tightness, not pleuritic in nature. EKG ordered -> normal sinus tach, possible anterolateral infarct (age undetermined). Troponin 0.024, D-dimer 0.64.   Patient's CP seems to have resolved on its own, low suspicion for PE at this time.

## 2018-09-29 NOTE — PLAN OF CARE
Problem: Patient Care Overview  Goal: Plan of Care Review  Outcome: Ongoing (interventions implemented as appropriate)  Pt free of falls or injury during shift.  VSS, afebrile, AAO x 4. Pain assessed and denied. Tele monitor in place. Breathing tx per order.  Pt c/o photo sensitivity, eye gtts PRN per orders.  Bed locked and in lowest position, SR raised x 2.  Call light within reach, pt belongings within reach. Will monitor.

## 2018-09-29 NOTE — ASSESSMENT & PLAN NOTE
- Bilateral LE edema improving with current diuresis regimen  - Transitioning to PO Lasix 80 mg BID today.

## 2018-09-29 NOTE — PROGRESS NOTES
"[facility] Hospital Medicine  Progress Note    Patient Name: Patrick Dennis  MRN: [id number]  Patient Class: IP- Inpatient   Admission Date: 9/26/2018  Length of Stay: 3 days  Attending Physician: Kim Sheppard MD  Primary Care Provider: No primary care provider on file.    Hospital Medicine Team: [facility] HOSP MED 2 Alejandro Walter MD    Subjective:     Principal Problem:Acute on chronic respiratory failure with hypoxia    HPI:  This is a 58 year old male with history of pulmonary HTN and ILD (on home oxygen 6 L)   presents with 3-4 days of progressively worsening dyspnea,He states that he frequently monitors his pulse ox at home and has noticed that he has been desatting to the 60%s with any ambulation with 6L O2 and has been increasing to 8 L. He received his IVIG 09/25. He has chronic lower extremities swelling and state that it did not worsen.    Denies any fevers, chills, N/V, change in bowel habits. No hx of dysuria, hematuria or decreased in UOP.        Hospital Course:  In ED 09/26, pt was given lasix 40 mg IVx1, solumedrol 125 mg IVx1, duonebs and supplemental o2, admitted to hospital medicine from acute on chronic respiratory failure, Patient was started on nebulized breathing treatments q6h and began aggressive diuresis with 40 mg IV Lasix TID. Patient responded adequately to this regimen with good UOP (~6 L over 36 hours -> 1.45 mg/kg/hr). Patient was originally placed on 10 liters O2 via high-flow NC but has clinically improved with diuresis and is now close to his home O2 requirements of 6 liters.     Interval History: No acute events overnight. Pt states this morning that "this is the best he's felt in a while." Patient continues to have good UOP (3.5 L over last 24 hrs). Patient continues to c/o photophobia when looking upwards, did not resolve upon removing steroids yesterday. Satting 88-92% on 8L this morning. Pt also c/o intermittent, sharp left-sided chest tightness " that began this morning. EKG ordered -> normal sinus tachycardia. D-Dimer 0.64    Review of Systems   Constitutional: Negative for chills, fatigue and fever.   HENT: Negative for congestion and sore throat.    Eyes: Positive for photophobia.   Respiratory: Positive for shortness of breath. Negative for cough.    Cardiovascular: Positive for chest pain and leg swelling. Negative for palpitations.   Gastrointestinal: Negative for abdominal pain, diarrhea, nausea and vomiting.   Genitourinary: Negative for decreased urine volume, dysuria and hematuria.   Musculoskeletal: Negative for arthralgias and myalgias.   Neurological: Negative for syncope, light-headedness and headaches.   Psychiatric/Behavioral: Negative for agitation.     Objective:     Vital Signs (Most Recent):  Temp: 98.5 °F (36.9 °C) (09/29/18 0500)  Pulse: 101 (09/29/18 0738)  Resp: 20 (09/29/18 0716)  BP: 133/88 (09/29/18 0545)  SpO2: 98 % (09/29/18 0545) Vital Signs (24h Range):  Temp:  [97.5 °F (36.4 °C)-98.5 °F (36.9 °C)] 98.5 °F (36.9 °C)  Pulse:  [] 101  Resp:  [18-20] 20  SpO2:  [93 %-100 %] 98 %  BP: (110-153)/() 133/88     Weight: 113.9 kg (251 lb 1.7 oz)  Body mass index is 36.03 kg/m².    Intake/Output Summary (Last 24 hours) at 9/29/2018 1030  Last data filed at 9/29/2018 0952  Gross per 24 hour   Intake 1220 ml   Output 3375 ml   Net -2155 ml      Physical Exam   Constitutional: He is oriented to person, place, and time. He appears well-developed. No distress.   HENT:   Head: Normocephalic and atraumatic.   Mouth/Throat: Oropharynx is clear and moist.   Eyes: Conjunctivae are normal.   Neck: Normal range of motion. Neck supple.   Cardiovascular: Normal rate, regular rhythm and normal heart sounds.   Pulmonary/Chest: Accessory muscle usage present. No tachypnea. He has decreased breath sounds (globallly). He has wheezes. He has rales (improved from yesterday, sounding more dry) in the right lower field and the left lower field. He  exhibits no tenderness.   On 7 L, comfort flow  Digital clubbing   Abdominal: Soft. There is no tenderness.   Musculoskeletal: He exhibits edema (bilateral lower limbs). He exhibits no tenderness or deformity.   Lymphadenopathy:     He has no cervical adenopathy.   Neurological: He is alert and oriented to person, place, and time.   Skin: Skin is warm and dry. There is erythema (dominic, nonblanching discoloration in feet).   Psychiatric: He has a normal mood and affect.   Nursing note and vitals reviewed.      Significant Labs:   CBC:   Recent Labs   Lab  09/28/18   0616  09/29/18   0558   WBC  8.87  10.00   HGB  15.3  15.0   HCT  52.1  50.3   PLT  157  145*     CMP:   Recent Labs   Lab  09/28/18   0748  09/28/18   1849  09/29/18   0755   NA  139  137  136   K  5.5*  3.9  3.7   CL  87*  85*  85*   CO2  42*  41*  41*   GLU  97  163*  87   BUN  19  21*  20   CREATININE  0.9  1.1  0.8   CALCIUM  10.0  9.9  9.6   ANIONGAP  10  11  10   EGFRNONAA  >60.0  >60.0  >60.0     Cardiac Markers: No results for input(s): CKMB, MYOGLOBIN, BNP, TROPISTAT in the last 48 hours.  Recent Lab Results       09/29/18  0955 09/29/18  0852 09/29/18  0800 09/29/18  0755 09/29/18  0558      Immature Granulocytes     0.3     Immature Grans (Abs)     0.03  Comment:  Mild elevation in immature granulocytes is non specific and   can be seen in a variety of conditions including stress response,   acute inflammation, trauma and pregnancy. Correlation with other   laboratory and clinical findings is essential.       Anion Gap    10      Baso #     0.03     Basophil%     0.3     BUN, Bld    20      Calcium    9.6      Chloride    85      CO2    41  Comment:  *Critical value -   Results called to and read back by:LASHAWN ARIZMENDI RN        Creatinine    0.8      D-Dimer 0.64  Comment:  The quantitative D-dimer assay should be used as an aid in   the diagnosis of deep vein thrombosis and pulmonary embolism  in patients with the appropriate presentation  and clinical  history. The upper limit of the reference interval and the clinical   cut off   point are identical. Causes of a positive (>0.50 mg/L FEU) D-Dimer   test  include, but are not limited to: DVT, PE, DIC, thrombolytic   therapy, anticoagulant therapy, recent surgery, trauma, or   pregnancy, disseminated malignancy, aortic aneurysm, cirrhosis,  and severe infection. False negative results may occur in   patients with distal DVT.           Differential Method     Automated     eGFR if     >60.0      eGFR if non     >60.0  Comment:  Calculation used to obtain the estimated glomerular filtration  rate (eGFR) is the CKD-EPI equation.         Eos #     0.0     Eosinophil%     0.3     Glucose    87      Gran # (ANC)     6.9     Gran%     69.0     Hematocrit     50.3     Hemoglobin     15.0     Lymph #     1.8     Lymph%     18.2     Magnesium    2.1      MCH     28.0     MCHC     29.8     MCV     94     Mono #     1.2     Mono%     11.9     MPV     9.8     nRBC     0     Phosphorus          Platelets     145     POCT Glucose   67       Potassium    3.7      RBC     5.35     RDW     13.4     Sodium    136      Troponin I  0.024  Comment:  The reference interval for Troponin I represents the 99th percentile   cutoff   for our facility and is consistent with 3rd generation assay   performance.          WBC     10.00                 09/29/18  0557 09/28/18  2036 09/28/18  1849 09/28/18  1726 09/28/18  1222      Immature Granulocytes          Immature Grans (Abs)          Anion Gap   11       Baso #          Basophil%          BUN, Bld   21       Calcium   9.9       Chloride   85       CO2   41  Comment:  *Critical value -   Results called to and read back by:Prachi Ny RN.          Creatinine   1.1       D-Dimer          Differential Method          eGFR if    >60.0       eGFR if non    >60.0  Comment:  Calculation used to obtain the  estimated glomerular filtration  rate (eGFR) is the CKD-EPI equation.          Eos #          Eosinophil%          Glucose   163       Gran # (ANC)          Gran%          Hematocrit          Hemoglobin          Lymph #          Lymph%          Magnesium   2.0       MCH          MCHC          MCV          Mono #          Mono%          MPV          nRBC          Phosphorus 4.1         Platelets          POCT Glucose  150  147 130     Potassium   3.9       RBC          RDW          Sodium   137       Troponin I          WBC                          Significant Imaging: N/A    Assessment/Plan:      * Acute on chronic respiratory failure with hypoxia    This is a 57 y/o male with hx of ILD on home oxygen 6L at home, presented with increase base line SOB and oxygen requirements.  -- Exam: low sats 86% on 6 L, tachycardic (baseline tachycardic ~100), lung scatred rhonchi, L.L pitting edema.  -- Labs showed high BNP, high bicarb on CMP, CXR progression on previous patchy findings   -- CTA done 09/11 did not show PE.    DDx: ILD exacerbation 2/2 reaction from IVIG infusion, vs CHF exacerbation, considering PE in the differential.  1- Continue supplemental o2, Goal 88%, has been titrated down from 10L to 7L.  2- Lasix 40 mg IV TID -> titrated down to 40 mg IV BID today (09/28)  3- Duonebs Q6h  4- U/S b/l lower extremities -> No evidence of DVT  5- Continue home Ofev 150 mg q12 (day 113)..  6- strict In/out, daily weight    Pulmonology was consulted for assistance with care, as he is a pt of Dr. Gutierrez. In addition to continuing diuresis, they had recommended starting a short course of steroids with prednisone 40 mg PO daily x 5 days, which patient was started on yesterday, 09/27. Pt today now complaining of new photophobia that began yesterday: No visible eye changes or complaints on exam that point towards a clear cause.  - No resolution of photophobia after discontinuing steroids.  - Transitioning to PO Lasix 80 mg BID  "today.   .          ILD (interstitial lung disease)    -- In regard to therapies, he was initially started on prednisone in [place] with essentially no change in disease course. He was later seen by Dr. Solorio in the [facility] and was started on Cellcept about a year ago for several months (stopped in July). This too had little impact on his disease trajectory. He was placed on Ofev in May of 2018 and may have noticed some decrease in his cough, however overall no significant change in his dyspnea or progression. He has also been following with an allergist, who had recommend a trial of IVIG infusions -- although it is not clear these have improved or delayed his progress, he does report feeling better typically a few days after receiving the infusions.   -- Pt of Dr. Oliver, last seen 09/06  -- Currently on Ofev (Day 113), and IVIG (last dose 09/25).  -- Last PFT 09/17:   * FEV1/FVC ratio 84%          Edema, lower extremity    - Bilateral LE edema improving with current diuresis regimen  - Transitioning to PO Lasix 80 mg BID today.           Chest pain    -- CP started in ED, heaviness on lest side of the chest, constant, not relived by nitroglycerin paste.  -- Trop 0.027 >>>> 0.019 on troponin #2  -- ECG no ST changes.    Patient with recurrence of chest pain today, Intermittent, sharp left-sided tightness, not pleuritic in nature. EKG ordered -> normal sinus tach, possible anterolateral infarct (age undetermined). Troponin 0.024, D-dimer 0.64. Will re-assess patient this afternoon.         Hypoxia              Pulmonary hypertension, unspecified    -- Echo 09/06 :  ** PA systolic pressure is 90 mmHg.           Cor pulmonale    -- Echo 09/06 :(EF 60-65%), Right ventricular enlargement with severely depressed systolic function, PA systolic pressure is 90 mmHg.   -- due to group 3 PH.  -- On lasix 80 mg daily at home.        Lung transplant candidate    -- Saw Dr. Monique 09/17, Per Note " at the present time he " would not be a suitable candidate for lung transplant to his debility and his obesity. Offered him a re-evaluation in 6 months time, which gives him the opportunity to pursue pulmonary rehab to improve his exercise tolerance and to continue his weight loss efforts, ( goal of another 40-50 lbs). referral to bariatric surgery for medical therapies Goal to least 800 ft on a 6 mwt.)  -- six mwt 09/17 = 50 ft.           IPF (idiopathic pulmonary fibrosis)    -- Allergen testing in addition to a basic rheumatologist work up including JOSH, ANCA, SCL-70, SSA and SSB were all unremarkable            VTE Risk Mitigation (From admission, onward)        Ordered     enoxaparin injection 40 mg  Daily      09/26/18 1918     IP VTE HIGH RISK PATIENT  Once      09/26/18 1917              Alejandro Walter MD  Department of Hospital Medicine   [facility]

## 2018-09-29 NOTE — SUBJECTIVE & OBJECTIVE
"Interval History: No acute events overnight. Pt states this morning that "this is the best he's felt in a while." Patient continues to have good UOP (3.5 L over last 24 hrs). Patient continues to c/o photophobia when looking upwards, did not resolve upon removing steroids yesterday. Satting 88-92% on 8L this morning. Pt also c/o intermittent, sharp left-sided chest tightness that began this morning. EKG ordered -> normal sinus tachycardia. D-Dimer 0.64    Review of Systems   Constitutional: Negative for chills, fatigue and fever.   HENT: Negative for congestion and sore throat.    Eyes: Positive for photophobia.   Respiratory: Positive for shortness of breath. Negative for cough.    Cardiovascular: Positive for chest pain and leg swelling. Negative for palpitations.   Gastrointestinal: Negative for abdominal pain, diarrhea, nausea and vomiting.   Genitourinary: Negative for decreased urine volume, dysuria and hematuria.   Musculoskeletal: Negative for arthralgias and myalgias.   Neurological: Negative for syncope, light-headedness and headaches.   Psychiatric/Behavioral: Negative for agitation.     Objective:     Vital Signs (Most Recent):  Temp: 98.5 °F (36.9 °C) (09/29/18 0500)  Pulse: 101 (09/29/18 0738)  Resp: 20 (09/29/18 0716)  BP: 133/88 (09/29/18 0545)  SpO2: 98 % (09/29/18 0545) Vital Signs (24h Range):  Temp:  [97.5 °F (36.4 °C)-98.5 °F (36.9 °C)] 98.5 °F (36.9 °C)  Pulse:  [] 101  Resp:  [18-20] 20  SpO2:  [93 %-100 %] 98 %  BP: (110-153)/() 133/88     Weight: 113.9 kg (251 lb 1.7 oz)  Body mass index is 36.03 kg/m².    Intake/Output Summary (Last 24 hours) at 9/29/2018 1030  Last data filed at 9/29/2018 0952  Gross per 24 hour   Intake 1220 ml   Output 3375 ml   Net -2155 ml      Physical Exam   Constitutional: He is oriented to person, place, and time. He appears well-developed. No distress.   HENT:   Head: Normocephalic and atraumatic.   Mouth/Throat: Oropharynx is clear and moist.   Eyes: " Conjunctivae are normal.   Neck: Normal range of motion. Neck supple.   Cardiovascular: Normal rate, regular rhythm and normal heart sounds.   Pulmonary/Chest: Effort normal. No respiratory distress. He has rales (much improved on exam today, continues to sound more dry) in the right lower field and the left lower field. He exhibits no tenderness.   On 7 L, comfort flow  Digital clubbing   Abdominal: Soft. There is no tenderness.   Musculoskeletal: He exhibits edema (bilateral lower limbs). He exhibits no tenderness or deformity.   Lymphadenopathy:     He has no cervical adenopathy.   Neurological: He is alert and oriented to person, place, and time.   Skin: Skin is warm and dry. There is erythema (dominic, nonblanching discoloration in feet).   Psychiatric: He has a normal mood and affect.   Nursing note and vitals reviewed.      Significant Labs:   CBC:   Recent Labs   Lab  09/28/18   0616  09/29/18   0558   WBC  8.87  10.00   HGB  15.3  15.0   HCT  52.1  50.3   PLT  157  145*     CMP:   Recent Labs   Lab  09/28/18   0748  09/28/18   1849  09/29/18   0755   NA  139  137  136   K  5.5*  3.9  3.7   CL  87*  85*  85*   CO2  42*  41*  41*   GLU  97  163*  87   BUN  19  21*  20   CREATININE  0.9  1.1  0.8   CALCIUM  10.0  9.9  9.6   ANIONGAP  10  11  10   EGFRNONAA  >60.0  >60.0  >60.0     Cardiac Markers: No results for input(s): CKMB, MYOGLOBIN, BNP, TROPISTAT in the last 48 hours.  Recent Lab Results       09/29/18  0955 09/29/18  0852 09/29/18  0800 09/29/18  0755 09/29/18  0558      Immature Granulocytes     0.3     Immature Grans (Abs)     0.03  Comment:  Mild elevation in immature granulocytes is non specific and   can be seen in a variety of conditions including stress response,   acute inflammation, trauma and pregnancy. Correlation with other   laboratory and clinical findings is essential.       Anion Gap    10      Baso #     0.03     Basophil%     0.3     BUN, Bld    20      Calcium    9.6      Chloride     85      CO2    41  Comment:  *Critical value -   Results called to and read back by:LASHAWN ARIZMENDI RN        Creatinine    0.8      D-Dimer 0.64  Comment:  The quantitative D-dimer assay should be used as an aid in   the diagnosis of deep vein thrombosis and pulmonary embolism  in patients with the appropriate presentation and clinical  history. The upper limit of the reference interval and the clinical   cut off   point are identical. Causes of a positive (>0.50 mg/L FEU) D-Dimer   test  include, but are not limited to: DVT, PE, DIC, thrombolytic   therapy, anticoagulant therapy, recent surgery, trauma, or   pregnancy, disseminated malignancy, aortic aneurysm, cirrhosis,  and severe infection. False negative results may occur in   patients with distal DVT.           Differential Method     Automated     eGFR if     >60.0      eGFR if non     >60.0  Comment:  Calculation used to obtain the estimated glomerular filtration  rate (eGFR) is the CKD-EPI equation.         Eos #     0.0     Eosinophil%     0.3     Glucose    87      Gran # (ANC)     6.9     Gran%     69.0     Hematocrit     50.3     Hemoglobin     15.0     Lymph #     1.8     Lymph%     18.2     Magnesium    2.1      MCH     28.0     MCHC     29.8     MCV     94     Mono #     1.2     Mono%     11.9     MPV     9.8     nRBC     0     Phosphorus          Platelets     145     POCT Glucose   67       Potassium    3.7      RBC     5.35     RDW     13.4     Sodium    136      Troponin I  0.024  Comment:  The reference interval for Troponin I represents the 99th percentile   cutoff   for our facility and is consistent with 3rd generation assay   performance.          WBC     10.00                 09/29/18  0557 09/28/18  2036 09/28/18  1849 09/28/18  1726 09/28/18  1222      Immature Granulocytes          Immature Grans (Abs)          Anion Gap   11       Baso #          Basophil%          BUN, Bld   21       Calcium   9.9        Chloride   85       CO2   41  Comment:  *Critical value -   Results called to and read back by:Prachi Ny RN.          Creatinine   1.1       D-Dimer          Differential Method          eGFR if    >60.0       eGFR if non    >60.0  Comment:  Calculation used to obtain the estimated glomerular filtration  rate (eGFR) is the CKD-EPI equation.          Eos #          Eosinophil%          Glucose   163       Gran # (ANC)          Gran%          Hematocrit          Hemoglobin          Lymph #          Lymph%          Magnesium   2.0       MCH          MCHC          MCV          Mono #          Mono%          MPV          nRBC          Phosphorus 4.1         Platelets          POCT Glucose  150  147 130     Potassium   3.9       RBC          RDW          Sodium   137       Troponin I          WBC                          Significant Imaging: N/A

## 2018-09-30 NOTE — ASSESSMENT & PLAN NOTE
-- In regard to therapies, he was initially started on prednisone in [place] with essentially no change in disease course. He was later seen by Dr. Solorio in the [facility] and was started on Cellcept about a year ago for several months (stopped in July). This too had little impact on his disease trajectory. He was placed on Ofev in May of 2018 and may have noticed some decrease in his cough, however overall no significant change in his dyspnea or progression. He has also been following with an allergist, who had recommend a trial of IVIG infusions -- although it is not clear these have improved or delayed his progress, he does report feeling better typically a few days after receiving the infusions.   -- Pt of Dr. Oliver, last seen 09/06  -- Currently on Ofev (Day 113), and IVIG (last dose 09/25).  -- Last PFT 09/17:   * FEV1/FVC ratio 84%

## 2018-09-30 NOTE — PLAN OF CARE
Problem: Patient Care Overview  Goal: Plan of Care Review  Outcome: Ongoing (interventions implemented as appropriate)  VS maintained via Visi.  Telemetry monitoring. No c/o pain during this shift.  Ambulates independently to bathroom.  Safety maintained during this shift.  Family(mom) at the bedside.  Will continue to monitor.

## 2018-09-30 NOTE — PLAN OF CARE
Problem: Infection, Risk/Actual (Adult)  Goal: Identify Related Risk Factors and Signs and Symptoms  Related risk factors and signs and symptoms are identified upon initiation of Human Response Clinical Practice Guideline (CPG)  Patient remained in stable condition this shift  Able to make needs known to staff.  Good urine out put.  Mother remains at bedside through shift to assist patient with any needs.

## 2018-09-30 NOTE — PROGRESS NOTES
"[facility] Hospital Medicine  Progress Note    Patient Name: Patrick Dennis  MRN: [id number]  Patient Class: IP- Inpatient   Admission Date: 9/26/2018  Length of Stay: 4 days  Attending Physician: Kim Sheppard MD  Primary Care Provider: No primary care provider on file.    Hospital Medicine Team: [facility] Alejandro Walter MD    Subjective:     Principal Problem:Acute on chronic respiratory failure with hypoxia    HPI:  This is a 58 year old male with history of pulmonary HTN and ILD (on home oxygen 6 L)   presents with 3-4 days of progressively worsening dyspnea,He states that he frequently monitors his pulse ox at home and has noticed that he has been desatting to the 60%s with any ambulation with 6L O2 and has been increasing to 8 L. He received his IVIG 09/25. He has chronic lower extremities swelling and state that it did not worsen.    Denies any fevers, chills, N/V, change in bowel habits. No hx of dysuria, hematuria or decreased in UOP.        Hospital Course:  In ED 09/26, pt was given lasix 40 mg IVx1, solumedrol 125 mg IVx1, duonebs and supplemental o2, admitted to hospital medicine from acute on chronic respiratory failure, Patient was started on nebulized breathing treatments q6h and began aggressive diuresis with 40 mg IV Lasix TID. Patient responded adequately to this regimen with good UOP (~6 L over 36 hours -> 1.45 mg/kg/hr). Patient was originally placed on 10 liters O2 via high-flow NC but has clinically improved with diuresis and is now close to his home O2 requirements of 6 liters.     Interval History: No acute events overnight. Pt states this morning that "this is the best he's felt in a while." Patient continues to have good UOP (3.5 L over last 24 hrs). Patient continues to c/o photophobia when looking upwards, did not resolve upon removing steroids yesterday. Satting 88-92% on 8L this morning. Pt also c/o intermittent, sharp left-sided chest tightness " that began this morning. EKG ordered -> normal sinus tachycardia. D-Dimer 0.64    Review of Systems   Constitutional: Negative for chills, fatigue and fever.   HENT: Negative for congestion and sore throat.    Eyes: Positive for photophobia.   Respiratory: Positive for shortness of breath. Negative for cough.    Cardiovascular: Positive for chest pain and leg swelling. Negative for palpitations.   Gastrointestinal: Negative for abdominal pain, diarrhea, nausea and vomiting.   Genitourinary: Negative for decreased urine volume, dysuria and hematuria.   Musculoskeletal: Negative for arthralgias and myalgias.   Neurological: Negative for syncope, light-headedness and headaches.   Psychiatric/Behavioral: Negative for agitation.     Objective:     Vital Signs (Most Recent):  Temp: 98.5 °F (36.9 °C) (09/29/18 0500)  Pulse: 101 (09/29/18 0738)  Resp: 20 (09/29/18 0716)  BP: 133/88 (09/29/18 0545)  SpO2: 98 % (09/29/18 0545) Vital Signs (24h Range):  Temp:  [97.5 °F (36.4 °C)-98.5 °F (36.9 °C)] 98.5 °F (36.9 °C)  Pulse:  [] 101  Resp:  [18-20] 20  SpO2:  [93 %-100 %] 98 %  BP: (110-153)/() 133/88     Weight: 113.9 kg (251 lb 1.7 oz)  Body mass index is 36.03 kg/m².    Intake/Output Summary (Last 24 hours) at 9/29/2018 1030  Last data filed at 9/29/2018 0952  Gross per 24 hour   Intake 1220 ml   Output 3375 ml   Net -2155 ml      Physical Exam   Constitutional: He is oriented to person, place, and time. He appears well-developed. No distress.   HENT:   Head: Normocephalic and atraumatic.   Mouth/Throat: Oropharynx is clear and moist.   Eyes: Conjunctivae are normal.   Neck: Normal range of motion. Neck supple.   Cardiovascular: Normal rate, regular rhythm and normal heart sounds.   Pulmonary/Chest: Effort normal. No respiratory distress. He has rales (much improved on exam today, continues to sound more dry) in the right lower field and the left lower field. He exhibits no tenderness.   On 7 L, comfort  flow  Digital clubbing   Abdominal: Soft. There is no tenderness.   Musculoskeletal: He exhibits edema (bilateral lower limbs). He exhibits no tenderness or deformity.   Lymphadenopathy:     He has no cervical adenopathy.   Neurological: He is alert and oriented to person, place, and time.   Skin: Skin is warm and dry. There is erythema (dominic, nonblanching discoloration in feet).   Psychiatric: He has a normal mood and affect.   Nursing note and vitals reviewed.      Significant Labs:   CBC:   Recent Labs   Lab  09/28/18   0616  09/29/18   0558   WBC  8.87  10.00   HGB  15.3  15.0   HCT  52.1  50.3   PLT  157  145*     CMP:   Recent Labs   Lab  09/28/18   0748  09/28/18   1849  09/29/18   0755   NA  139  137  136   K  5.5*  3.9  3.7   CL  87*  85*  85*   CO2  42*  41*  41*   GLU  97  163*  87   BUN  19  21*  20   CREATININE  0.9  1.1  0.8   CALCIUM  10.0  9.9  9.6   ANIONGAP  10  11  10   EGFRNONAA  >60.0  >60.0  >60.0     Cardiac Markers: No results for input(s): CKMB, MYOGLOBIN, BNP, TROPISTAT in the last 48 hours.  Recent Lab Results       09/29/18  0955 09/29/18  0852 09/29/18  0800 09/29/18  0755 09/29/18  0558      Immature Granulocytes     0.3     Immature Grans (Abs)     0.03  Comment:  Mild elevation in immature granulocytes is non specific and   can be seen in a variety of conditions including stress response,   acute inflammation, trauma and pregnancy. Correlation with other   laboratory and clinical findings is essential.       Anion Gap    10      Baso #     0.03     Basophil%     0.3     BUN, Bld    20      Calcium    9.6      Chloride    85      CO2    41  Comment:  *Critical value -   Results called to and read back by:LASHAWN ARIZMENDI RN        Creatinine    0.8      D-Dimer 0.64  Comment:  The quantitative D-dimer assay should be used as an aid in   the diagnosis of deep vein thrombosis and pulmonary embolism  in patients with the appropriate presentation and clinical  history. The upper limit of  the reference interval and the clinical   cut off   point are identical. Causes of a positive (>0.50 mg/L FEU) D-Dimer   test  include, but are not limited to: DVT, PE, DIC, thrombolytic   therapy, anticoagulant therapy, recent surgery, trauma, or   pregnancy, disseminated malignancy, aortic aneurysm, cirrhosis,  and severe infection. False negative results may occur in   patients with distal DVT.           Differential Method     Automated     eGFR if     >60.0      eGFR if non     >60.0  Comment:  Calculation used to obtain the estimated glomerular filtration  rate (eGFR) is the CKD-EPI equation.         Eos #     0.0     Eosinophil%     0.3     Glucose    87      Gran # (ANC)     6.9     Gran%     69.0     Hematocrit     50.3     Hemoglobin     15.0     Lymph #     1.8     Lymph%     18.2     Magnesium    2.1      MCH     28.0     MCHC     29.8     MCV     94     Mono #     1.2     Mono%     11.9     MPV     9.8     nRBC     0     Phosphorus          Platelets     145     POCT Glucose   67       Potassium    3.7      RBC     5.35     RDW     13.4     Sodium    136      Troponin I  0.024  Comment:  The reference interval for Troponin I represents the 99th percentile   cutoff   for our facility and is consistent with 3rd generation assay   performance.          WBC     10.00                 09/29/18  0557 09/28/18  2036 09/28/18  1849 09/28/18  1726 09/28/18  1222      Immature Granulocytes          Immature Grans (Abs)          Anion Gap   11       Baso #          Basophil%          BUN, Bld   21       Calcium   9.9       Chloride   85       CO2   41  Comment:  *Critical value -   Results called to and read back by:Prachi Ny RN.          Creatinine   1.1       D-Dimer          Differential Method          eGFR if    >60.0       eGFR if non    >60.0  Comment:  Calculation used to obtain the estimated glomerular filtration  rate (eGFR) is  the CKD-EPI equation.          Eos #          Eosinophil%          Glucose   163       Gran # (ANC)          Gran%          Hematocrit          Hemoglobin          Lymph #          Lymph%          Magnesium   2.0       MCH          MCHC          MCV          Mono #          Mono%          MPV          nRBC          Phosphorus 4.1         Platelets          POCT Glucose  150  147 130     Potassium   3.9       RBC          RDW          Sodium   137       Troponin I          WBC                          Significant Imaging: N/A    Assessment/Plan:      * Acute on chronic respiratory failure with hypoxia    This is a 57 y/o male with hx of ILD on home oxygen 6L at home, presented with increase base line SOB and oxygen requirements.  -- Exam: low sats 86% on 6 L, tachycardic (baseline tachycardic ~100), lung scatred rhonchi, L.L pitting edema.  -- Labs showed high BNP, high bicarb on CMP, CXR progression on previous patchy findings   -- CTA done 09/11 did not show PE.    DDx: ILD exacerbation 2/2 reaction from IVIG infusion, vs CHF exacerbation, considering PE in the differential.  1- Continue supplemental o2, Goal 88%, has been titrated down from 10L to 7L.  2- Lasix 40 mg IV TID -> titrated down to 40 mg IV BID today (09/28)  3- Duonebs Q6h  4- U/S b/l lower extremities -> No evidence of DVT  5- Continue home Ofev 150 mg q12 (day 113)..  6- strict In/out, daily weight    Pulmonology was consulted for assistance with care, as he is a pt of Dr. Gutierrez. In addition to continuing diuresis, they had recommended starting a short course of steroids with prednisone 40 mg PO daily x 5 days, which patient was started on yesterday, 09/27. Pt today now complaining of new photophobia that began yesterday: No visible eye changes or complaints on exam that point towards a clear cause.  - Pt's photophobia improving today, seems to be resolving on it's own.   - Currently on 80 mg Lasix PO BID, plan to cut down to 80 mg PO qd upon  discharge.  - Will set up ambulatory referral to pulmonary rehab          ILD (interstitial lung disease)    -- In regard to therapies, he was initially started on prednisone in [place] with essentially no change in disease course. He was later seen by Dr. Solorio in the [facility] and was started on Cellcept about a year ago for several months (stopped in July). This too had little impact on his disease trajectory. He was placed on Ofev in May of 2018 and may have noticed some decrease in his cough, however overall no significant change in his dyspnea or progression. He has also been following with an allergist, who had recommend a trial of IVIG infusions -- although it is not clear these have improved or delayed his progress, he does report feeling better typically a few days after receiving the infusions.   -- Pt of Dr. Oliver, last seen 09/06  -- Currently on Ofev (Day 113), and IVIG (last dose 09/25).  -- Last PFT 09/17:   * FEV1/FVC ratio 84%          Edema, lower extremity    - Bilateral LE edema improving with current diuresis regimen  - Transitioning to PO Lasix 80 mg BID today.           Chest pain    -- CP started in ED, heaviness on lest side of the chest, constant, not relived by nitroglycerin paste.  -- Trop 0.027 >>>> 0.019 on troponin #2  -- ECG no ST changes.    Patient with recurrence of chest pain today, Intermittent, sharp left-sided tightness, not pleuritic in nature. EKG ordered -> normal sinus tach, possible anterolateral infarct (age undetermined). Troponin 0.024, D-dimer 0.64.   Patient's CP seems to have resolved on its own, low suspicion for PE at this time.        Hypoxia              Pulmonary hypertension, unspecified    -- Echo 09/06 :  ** PA systolic pressure is 90 mmHg.           Cor pulmonale    -- Echo 09/06 :(EF 60-65%), Right ventricular enlargement with severely depressed systolic function, PA systolic pressure is 90 mmHg.   -- due to group 3 PH.  -- On lasix 80 mg daily at  "home.        Lung transplant candidate    -- Saw Dr. Monique 09/17, Per Note " at the present time he would not be a suitable candidate for lung transplant to his debility and his obesity. Offered him a re-evaluation in 6 months time, which gives him the opportunity to pursue pulmonary rehab to improve his exercise tolerance and to continue his weight loss efforts, ( goal of another 40-50 lbs). referral to bariatric surgery for medical therapies Goal to least 800 ft on a 6 mwt.)  -- six mwt 09/17 = 50 ft.           IPF (idiopathic pulmonary fibrosis)    -- Allergen testing in addition to a basic rheumatologist work up including JOSH, ANCA, SCL-70, SSA and SSB were all unremarkable            VTE Risk Mitigation (From admission, onward)        Ordered     enoxaparin injection 40 mg  Daily      09/26/18 1918     IP VTE HIGH RISK PATIENT  Once      09/26/18 1917              Alejandro Walter MD  Department of Hospital Medicine   [facility]  "

## 2018-10-01 NOTE — DISCHARGE INSTRUCTIONS
- Please continue to take Lasix upon discharge from the hospital, Take Lasix 80 mg daily (four 20 mg tablets.    - Please weigh yourself daily. A weight gain of >2 pounds over one day or 5 pounds over a week is concerning for fluid build-up. If this occurs, call your doctor and consider taking an extra dose of Lasix.    - You will be contacted with further information about the details of Pulmonary rehab and set-up for transportation.     - Please follow up with  at your scheduled appointment on Thursday, October 25th at 11:00 AM.

## 2018-10-01 NOTE — NURSING
Patient d/c home. All belongings gathered by mother. Paperwork reviewed with mother and patient. IV removed w/difficulty. No distress or discomfort present.

## 2018-10-01 NOTE — PLAN OF CARE
10/01/18 1313   Final Note   Assessment Type Final Discharge Note   Discharge Disposition Home   What phone number can be called within the next 1-3 days to see how you are doing after discharge? [phone]   Hospital Follow Up  Appt(s) scheduled? Yes   Discharge plans and expectations educations in teach back method with documentation complete? Yes   Right Care Referral Info   Post Acute Recommendation No Care     Future Appointments   Date Time Provider Department Center   10/25/2018 11:00 AM Alexis Oliver MD [facility] Gerardo Soria

## 2018-10-01 NOTE — PLAN OF CARE
10/01/2018      Patrick Dennis  [address] [place] [place] [address]          Hospital Medicine Dept.  [facility] [address] [address]  [phone] after hours  [phone] fax Patrick Dennis    61    Please calibrate home oxygen concentrator to reach 8L.    Please contact me if you have any questions.                  __________________________  Kim Sheppard MD  10/01/2018

## 2018-10-01 NOTE — PT/OT/SLP PROGRESS
Physical Therapy Treatment    Patient Name:  Patrick Dennis   MRN:  [id number]    Recommendations:     Discharge Recommendations:  home with home health   Discharge Equipment Recommendations: walker, rolling, wheelchair   Barriers to discharge: None    Assessment:     Patrick Dennis is a 58 y.o. male admitted with a medical diagnosis of Acute on chronic respiratory failure with hypoxia.  He presents with the following impairments/functional limitations:  weakness, impaired endurance, impaired self care skills, gait instability, impaired functional mobilty, impaired balance, impaired cardiopulmonary response to activity .Pt tolerated treatment fairly well and is progressing slowly with mobility. Pt limited due to SOB and desaturation of O2 sats with mobility Pt would continue to benefit from skilled PT to address overall functional mobility and goals. Goals remain appropriate       Rehab Prognosis:  good; patient would benefit from acute skilled PT services to address these deficits and reach maximum level of function.      Recent Surgery: * No surgery found *      Plan:     During this hospitalization, patient to be seen 3 x/week to address the above listed problems via gait training, therapeutic activities, therapeutic exercises  · Plan of Care Expires:  10/28/18   Plan of Care Reviewed with: patient, mother    Subjective     Communicated with RN prior to session.  Patient found seated at EOB upon PT entry to room, agreeable to treatment.      Chief Complaint: SOB    Pain/Comfort:  · Pain Rating 1: 0/10  · Pain Rating Post-Intervention 1: 0/10    Patients cultural, spiritual, Jain conflicts given the current situation: none noted    Objective:     Patient found with: telemetry, oxygen     General Precautions: Standard, fall, aspiration, respiratory   Orthopedic Precautions:N/A   Braces: N/A     Functional Mobility:  Transfers:     · Sit to Stand:  stand by assistance with rolling  walker     Gait: 40ft SBA with RW with chair and O2 in tow, vc's for AD management; limited by SOB .vcs for upright posture and proper breathing    AM-PAC 6 CLICK MOBILITY  Turning over in bed (including adjusting bedclothes, sheets and blankets)?: 3  Sitting down on and standing up from a chair with arms (e.g., wheelchair, bedside commode, etc.): 3  Moving from lying on back to sitting on the side of the bed?: 3  Moving to and from a bed to a chair (including a wheelchair)?: 3  Need to walk in hospital room?: 3  Climbing 3-5 steps with a railing?: 3  Basic Mobility Total Score: 18       Therapeutic Activities and Exercises:   Discussed/educated patient on progress, safety, importance of OOB mobility for improving outcomes,d/c, PT POC   Patient performed therex X 10 reps seated in bedside chair B LE AROM AP, LAQ with rest breaks due to SOB and desaturation of O2 sats to 84%   White board updated in patients room to current assistance level   Donned an extra gown  Bedside table in front of patient and area set up for function, convenience, and safety. RN aware of patient's mobility needs and status. Questions/concerns addressed within PTA scope of practice; patient with no further questions      Patient left up in chair with all lines intact, call button in reach, nsg notified and family present..    GOALS:   Multidisciplinary Problems     Physical Therapy Goals        Problem: Physical Therapy Goal    Goal Priority Disciplines Outcome Goal Variances Interventions   Physical Therapy Goal     PT, PT/OT Ongoing (interventions implemented as appropriate)     Description:  Goals to be met by: 10/08/2018     Patient will increase functional independence with mobility by performin. Supine to sit with Modified [place]  2. Sit to supine with Modified [place]  3. Sit to stand transfer with Supervision  4. Gait  x 150 feet with Supervision with or without appropriate AD.   5. Lower extremity exercise program  x15 reps per handout, with supervision                      Time Tracking:     PT Received On: 10/01/18  PT Start Time: 1220     PT Stop Time: 1258  PT Total Time (min): 38 min     Billable Minutes: Gait Training 15, Therapeutic Activity 13 and Therapeutic Exercise 10    Treatment Type: Treatment  PT/PTA: PTA     PTA Visit Number: 1     Ramiro Alvarado, PTA  10/01/2018

## 2018-10-01 NOTE — PLAN OF CARE
Problem: Patient Care Overview  Goal: Plan of Care Review  Outcome: Ongoing (interventions implemented as appropriate)  Patient is alert and oriented x3. Continues to receive 8L/O2/NC., stating above 88%. Experiences sob upon exertion.Mother present at bedside. Pt shows no s/s of distress or discomfort. Safety measures met. Free from falls and injury. Denies any pain. Able to verbalize all needs.. Has adequate food and fluid intake. Bed locked and placed in lowest position. Call light within reach.

## 2018-10-01 NOTE — PLAN OF CARE
Problem: Physical Therapy Goal  Goal: Physical Therapy Goal  Goals to be met by: 10/08/2018     Patient will increase functional independence with mobility by performin. Supine to sit with Modified [place]  2. Sit to supine with Modified [place]  3. Sit to stand transfer with Supervision  4. Gait  x 150 feet with Supervision with or without appropriate AD.   5. Lower extremity exercise program x15 reps per handout, with supervision     Pt progressing towards goals. continue with PT POC.Goals remain appropriate.  Ramiro Alvarado PTA  10/1/2018

## 2018-10-01 NOTE — DISCHARGE SUMMARY
[facility] Hospital Medicine  Discharge Summary      Patient Name: Patrick Dennis  MRN: [id number]  Admission Date: 9/26/2018  Hospital Length of Stay: 5 days  Discharge Date and Time:  10/01/2018 2:26 PM  Attending Physician: Kim Sheppard MD   Discharging Provider: Alejandro Walter MD  Primary Care Provider: No primary care provider on file.  Hospital Medicine Team: [facility] Alejandro Walter MD    HPI:   Mr. Dennis is a 58 year old male with history of ILD (on home oxygen 6 L), pulmonary HTN, and cor pulmonale who presents with 3-4 days of progressively worsening dyspnea. He states that his resting SpO2 is normally in the low 90s, but last night it was in the 80s and did not improve after he increased his O2 flow to 8L/min. Pt was on max O2 flow at home and still felt significally short of breath so his family brought him to ED. ROS also significant for cough with occasional white/forthy sputum, and lower limb edema. He reports 9 lb weight loss in last 2 weeks due to diuretic use. He denies fever, chills, chest pain, diarrhea, dysuria, recent travel, and interaction with sick contacts.      Pt was diagnosed with ILD, possibly hypersensitivity 2/2 pine exposure from [employer] work, by Dr. Burgos in [place]. He was treated with prednisone without much improvement in symptoms, even after leaving his job.  He now follows up with Dr. Oliver (previously saw Dr. Solorio). Workup at [facility] has included connective tissue disease serology (JOSH, ANCA, SCL-70, SSA, & SSB), all of which were all negative. Allergy testing was also unremarkbale. He was on Cellcept from 02/2018 until 07/2017 of this year. Cellcept was discontinued because it did not improve his symptoms. During that time, he was also weaned off prednisone. He was started on Ofev in 05/2018, which he reports has improved his symptoms. He has also be receiving IVIG since July. He states he usually feels SOB and fatigue on day 1 of  IVIG infusions, but it improves by day 3. He was evaluated for transplant by Dr. Monique on 9/17 (not a lung transplant candidate at this time due to debility and obesity).          Objective:     Vital Signs (Most Recent):  Temp: 97.4 °F (36.3 °C) (10/01/18 1302)  Pulse: 85 (10/01/18 1326)  Resp: 19 (10/01/18 1326)  BP: 117/80 (10/01/18 1302)  SpO2: 95 % (10/01/18 1326) Vital Signs (24h Range):  Temp:  [97.4 °F (36.3 °C)-98.1 °F (36.7 °C)] 97.4 °F (36.3 °C)  Pulse:  [] 85  Resp:  [18-39] 19  SpO2:  [89 %-96 %] 95 %  BP: (109-133)/(78-97) 117/80     Weight: 112.2 kg (247 lb 5.7 oz)  Body mass index is 35.49 kg/m².    Intake/Output Summary (Last 24 hours) at 10/1/2018 1538  Last data filed at 10/1/2018 1133  Gross per 24 hour   Intake 240 ml   Output 1700 ml   Net -1460 ml      Physical Exam   Constitutional: He is oriented to person, place, and time. He appears well-developed. No distress.   HENT:   Head: Normocephalic and atraumatic.   Mouth/Throat: Oropharynx is clear and moist.   Eyes: Conjunctivae are normal.   Neck: Normal range of motion. Neck supple.   Cardiovascular: Normal rate, regular rhythm and normal heart sounds.   Pulmonary/Chest: Effort normal. No respiratory distress. He has rales (much improved on exam today, continues to sound more dry) in the right lower field and the left lower field. He exhibits no tenderness.   On 7 L, comfort flow  Digital clubbing   Abdominal: Soft. There is no tenderness.   Musculoskeletal: He exhibits edema (bilateral lower limbs). He exhibits no tenderness or deformity.   Lymphadenopathy:     He has no cervical adenopathy.   Neurological: He is alert and oriented to person, place, and time.   Skin: Skin is warm and dry. There is erythema (dominic, nonblanching discoloration in feet).   Psychiatric: He has a normal mood and affect.   Nursing note and vitals reviewed.    Hospital Course:   In ED 09/26, pt was given lasix 40 mg IVx1, solumedrol 125 mg IVx1, duonebs and  supplemental o2, admitted to hospital medicine from acute on chronic respiratory failure, Patient was started on nebulized breathing treatments q6h and began aggressive diuresis with 40 mg IV Lasix TID. Patient responded adequately to this regimen with good UOP (~6 L over 36 hours -> 1.45 mg/kg/hr). Patient was originally placed on 10 liters O2 via high-flow NC but has clinically improved with diuresis (transitioned after 3 days to 80 mg BID) and is now close to his home O2 requirements of 6 liters, currently wavering between 6-8 L O2      Consults:   Consults (From admission, onward)        Status Ordering Provider     Inpatient consult to Pulmonology  Once     Provider:  (Not yet assigned)    Completed [facility]          * Acute on chronic respiratory failure with hypoxia    This is a 59 y/o male with hx of ILD on home oxygen 6L at home, presented with increase base line SOB and oxygen requirements.  -- Exam: low sats 86% on 6 L, tachycardic (baseline tachycardic ~100), lung scatred rhonchi, L.L pitting edema.  -- Labs showed high BNP, high bicarb on CMP, CXR progression on previous patchy findings   -- CTA done 09/11 did not show PE.    DDx: ILD exacerbation 2/2 reaction from IVIG infusion, vs CHF exacerbation, considering PE in the differential.  1- Continue supplemental o2, Goal 88%, has been titrated down from 10L to 7L.  2- Lasix 40 mg IV TID -> titrated down to 40 mg IV BID today (09/28)  3- Duonebs Q6h  4- U/S b/l lower extremities -> No evidence of DVT  5- Continue home Ofev 150 mg q12   6- strict In/out, daily weight    Pulmonology was consulted for assistance with care, as he is a pt of Dr. Gutierrez. In addition to continuing diuresis, they had recommended starting a short course of steroids with prednisone 40 mg PO daily x 5 days, which patient was started on yesterday, 09/27. Pt today now complaining of new photophobia that began yesterday: No visible eye changes or complaints on exam that  "point towards a clear cause.    - Pt's photophobia has resolved.    - Pt is clinically stable and ready for discharge today.  - Pt should continue taking PO Lasix at home, 80 mg daily  - Will set up ambulatory referral to pulmonary rehab          ILD (interstitial lung disease)    -- In regard to therapies, he was initially started on prednisone in [place] with essentially no change in disease course. He was later seen by Dr. Solorio in the [facility] clinic and was started on Cellcept about a year ago for several months (stopped in July). This too had little impact on his disease trajectory. He was placed on Ofev in May of 2018 and may have noticed some decrease in his cough, however overall no significant change in his dyspnea or progression. He has also been following with an allergist, who had recommend a trial of IVIG infusions -- although it is not clear these have improved or delayed his progress, he does report feeling better typically a few days after receiving the infusions.   -- Pt of Dr. Oliver, last seen 09/06  -- Currently on Ofev and IVIG (last dose 09/25).  -- Last PFT 09/17:   * FEV1/FVC ratio 84%    -Patient has follow up appointment with Dr. Oliver scheduled for Thursday, October 25th @ 11:00 AM.          Edema, lower extremity    - Continue PO Lasix at home, 80 mg daily          Lung transplant candidate    -- Saw Dr. Monique 09/17, Per Note " at the present time he would not be a suitable candidate for lung transplant to his debility and his obesity. Offered him a re-evaluation in 6 months time, which gives him the opportunity to pursue pulmonary rehab to improve his exercise tolerance and to continue his weight loss efforts, ( goal of another 40-50 lbs). referral to bariatric surgery for medical therapies Goal to least 800 ft on a 6 mwt.)  -- six mwt 09/17 = 50 ft.   - Follow up appointment with Pulmonology, Dr. Oliver on Thursday, October 25th @ 11:00 AM          Final Active Diagnoses:    Diagnosis " Date Noted POA    PRINCIPAL PROBLEM:  Acute on chronic respiratory failure with hypoxia [J96.21] 04/17/2018 Yes    ILD (interstitial lung disease) [J84.9] 07/10/2017 Yes    Edema, lower extremity [R60.0] 07/17/2018 Yes    Chest pain [R07.9] 09/26/2018 Unknown    Pulmonary hypertension, unspecified [I27.20] 09/17/2018 Yes    Cor pulmonale [I27.81] 09/17/2018 Yes    Lung transplant candidate [Z76.82]  Not Applicable    IPF (idiopathic pulmonary fibrosis) [J84.112]  Yes      Problems Resolved During this Admission:       Discharged Condition: good    Disposition: Home or Self Care    Follow Up:    Patient Instructions:      Ambulatory Referral to Pulmonary Rehab   Referral Priority: Routine Referral Type: Consultation   Referral Reason: Specialty Services Required   Requested Specialty: Pulmonary Disease   Number of Visits Requested: 1       Significant Diagnostic Studies: Labs:   BMP:   Recent Labs   Lab  09/30/18   0844  09/30/18   1855 09/30/18   1856  10/01/18   0823   GLU  153*   --   149*  103   NA  137   --   138  136   K  4.3   --   4.0  3.7   CL  84*   --   84*  84*   CO2  41*   --   41*  44*   BUN  19   --   20  18   CREATININE  0.9   --   1.0  0.8   CALCIUM  9.8   --   9.9  9.8   MG  2.0  1.9   --   1.9   , CMP   Recent Labs   Lab  09/30/18   0844  09/30/18   1856  10/01/18   0823   NA  137  138  136   K  4.3  4.0  3.7   CL  84*  84*  84*   CO2  41*  41*  44*   GLU  153*  149*  103   BUN  19  20  18   CREATININE  0.9  1.0  0.8   CALCIUM  9.8  9.9  9.8   ANIONGAP  12  13  8   ESTGFRAFRICA  >60.0  >60.0  >60.0   EGFRNONAA  >60.0  >60.0  >60.0    and CBC   Recent Labs   Lab  09/30/18   0710  10/01/18   0635   WBC  9.56  8.60   HGB  15.3  15.1   HCT  50.7  49.2   PLT  151  158     Microbiology:   Blood Culture   Lab Results   Component Value Date    LABBLOO No Growth to date 09/30/2018    LABBLOO No Growth to date 09/30/2018    LABBLOO No Growth to date 09/30/2018    LABBLOO No Growth to date 09/30/2018      Radiology: X-Ray: CXR: X-Ray Chest 1 View (CXR): No results found for this visit on 09/26/18.    Pending Diagnostic Studies:     None         Medications:  Reconciled Home Medications:      Medication List      CHANGE how you take these medications    Immune Globulin G (IGG)-PRO-IGA 10 % injection (Privigen) 10 % Soln  Commonly known as:  PRIVIGEN  Inject 750 mLs (75 g total) into the vein every 28 days.  What changed:  additional instructions        CONTINUE taking these medications    acetaminophen 325 MG tablet  Commonly known as:  TYLENOL  Take 650 mg by mouth every 6 (six) hours as needed for Pain.     albuterol 2.5 mg /3 mL (0.083 %) nebulizer solution  Commonly known as:  PROVENTIL  Take 3 mLs (2.5 mg total) by nebulization every 6 (six) hours as needed for Shortness of Breath.     diphenhydrAMINE 25 mg capsule  Commonly known as:  BENADRYL  Take 1 tablet by mouth before infusion 2 times per month     fexofenadine 180 MG tablet  Commonly known as:  ALLEGRA  Take 180 mg by mouth daily as needed.     furosemide 20 MG tablet  Commonly known as:  LASIX  Take 4 tablets (80 mg total) by mouth once daily.     guaiFENesin 400 mg Tab  Commonly known as:  HUMIBID E  Take 400 mg by mouth 2 (two) times daily.     nintedanib 150 mg Cap  Commonly known as:  OFEV  Take 150 mg by mouth 2 (two) times daily.     potassium chloride SA 10 MEQ tablet  Commonly known as:  K-DUR,KLOR-CON  Take 1 tablet (10 mEq total) by mouth 2 (two) times daily.            Indwelling Lines/Drains at time of discharge:   Lines/Drains/Airways          None          Time spent on the discharge of patient: 30 minutes  Patient was seen and examined on the date of discharge and determined to be suitable for discharge.    Alejandro Walter MD  Department of Hospital Medicine  [facility]

## 2018-10-01 NOTE — ASSESSMENT & PLAN NOTE
"-- Saw Dr. Monique 09/17, Per Note " at the present time he would not be a suitable candidate for lung transplant to his debility and his obesity. Offered him a re-evaluation in 6 months time, which gives him the opportunity to pursue pulmonary rehab to improve his exercise tolerance and to continue his weight loss efforts, ( goal of another 40-50 lbs). referral to bariatric surgery for medical therapies Goal to least 800 ft on a 6 mwt.)  -- six mwt 09/17 = 50 ft.   - Follow up appointment with Pulmonology, Dr. Oliver on Thursday, October 25th @ 11:00 AM  "

## 2018-10-01 NOTE — ASSESSMENT & PLAN NOTE
-- In regard to therapies, he was initially started on prednisone in [place] with essentially no change in disease course. He was later seen by Dr. Solorio in the [facility] clinic and was started on Cellcept about a year ago for several months (stopped in July). This too had little impact on his disease trajectory. He was placed on Ofev in May of 2018 and may have noticed some decrease in his cough, however overall no significant change in his dyspnea or progression. He has also been following with an allergist, who had recommend a trial of IVIG infusions -- although it is not clear these have improved or delayed his progress, he does report feeling better typically a few days after receiving the infusions.   -- Pt of Dr. Oliver, last seen 09/06  -- Currently on Ofev and IVIG (last dose 09/25).  -- Last PFT 09/17:   * FEV1/FVC ratio 84%    -Patient has follow up appointment with Dr. Oliver scheduled for Thursday, October 25th @ 11:00 AM.

## 2018-10-01 NOTE — PLAN OF CARE
Spoke to Chris at [facility]-gave her the pt information for referral to Pulmonary rehab. She will look out for referral and process after it is received.

## 2018-10-01 NOTE — PLAN OF CARE
[facility] informed Pt needs higher o2 @ home, [facility] contacted [facility] at , spoke with Reyna at [facility]. [facility] faxed face sheet, h & p, progress notes, and prescription for higher 02 to .

## 2018-10-01 NOTE — ASSESSMENT & PLAN NOTE
This is a 59 y/o male with hx of ILD on home oxygen 6L at home, presented with increase base line SOB and oxygen requirements.  -- Exam: low sats 86% on 6 L, tachycardic (baseline tachycardic ~100), lung scatred rhonchi, L.L pitting edema.  -- Labs showed high BNP, high bicarb on CMP, CXR progression on previous patchy findings   -- CTA done 09/11 did not show PE.    DDx: ILD exacerbation 2/2 reaction from IVIG infusion, vs CHF exacerbation, considering PE in the differential.  1- Continue supplemental o2, Goal 88%, has been titrated down from 10L to 7L.  2- Lasix 40 mg IV TID -> titrated down to 40 mg IV BID today (09/28)  3- Duonebs Q6h  4- U/S b/l lower extremities -> No evidence of DVT  5- Continue home Ofev 150 mg q12   6- strict In/out, daily weight    Pulmonology was consulted for assistance with care, as he is a pt of Dr. Gutierrez. In addition to continuing diuresis, they had recommended starting a short course of steroids with prednisone 40 mg PO daily x 5 days, which patient was started on yesterday, 09/27. Pt today now complaining of new photophobia that began yesterday: No visible eye changes or complaints on exam that point towards a clear cause.    - Pt's photophobia has resolved.    - Pt is clinically stable and ready for discharge today.  - Pt should continue taking PO Lasix at home, 80 mg daily  - Will set up ambulatory referral to pulmonary rehab

## 2018-10-01 NOTE — PLAN OF CARE
Problem: Patient Care Overview  Goal: Plan of Care Review  Pt slept for most of the night on his oxygen between 6 liters to 8 liters per humidified O2. When ambulatory, pt will bump up his oxygen to 10 liters as he desats to the low 80s. Mother was disappointed to find out that respiratory bumped up his oxygen to 8 liters at 12 mn as the pt was not maintaining his sats. Will continue to monitor.

## 2018-10-02 NOTE — PT/OT/SLP DISCHARGE
Physical Therapy Discharge Summary    Name: Patrick Dennis  MRN: [id number]   Principal Problem: Acute on chronic respiratory failure with hypoxia     Patient Discharged from acute Physical Therapy on 10/01/2018.  Please refer to prior PT noted date on 10/01/2018 for functional status.     Assessment:     Patient was discharged unexpectedly.  Information required to complete an accurate discharge summary is unknown.  Refer to therapy initial evaluation and last progress note for initial and most recent functional status and goal achievement.  Recommendations made may be found in medical record.    Objective:     GOALS:   Multidisciplinary Problems     Physical Therapy Goals        Problem: Physical Therapy Goal    Goal Priority Disciplines Outcome Goal Variances Interventions   Physical Therapy Goal     PT, PT/OT Ongoing (interventions implemented as appropriate)     Description:  Goals to be met by: 10/08/2018     Patient will increase functional independence with mobility by performin. Supine to sit with Modified [place]  2. Sit to supine with Modified [place]  3. Sit to stand transfer with Supervision  4. Gait  x 150 feet with Supervision with or without appropriate AD.   5. Lower extremity exercise program x15 reps per handout, with supervision                      Reasons for Discontinuation of Therapy Services  Transfer to alternate level of care.      Plan:     Patient Discharged to: home with pulm rehab.    SHANNON HAMLIN, PT  10/2/2018

## 2018-10-03 NOTE — PATIENT INSTRUCTIONS

## 2018-10-05 PROBLEM — R07.9 CHEST PAIN: Status: RESOLVED | Noted: 2018-01-01 | Resolved: 2018-01-01

## 2018-10-05 PROBLEM — I27.23 PULMONARY HYPERTENSION DUE TO INTERSTITIAL LUNG DISEASE: Chronic | Status: ACTIVE | Noted: 2018-01-01

## 2018-10-05 PROBLEM — I50.813 ACUTE ON CHRONIC RIGHT HEART FAILURE: Status: ACTIVE | Noted: 2018-01-01

## 2018-10-05 PROBLEM — J31.0 CHRONIC RHINITIS: Chronic | Status: ACTIVE | Noted: 2017-07-10

## 2018-10-05 PROBLEM — I27.81 COR PULMONALE: Chronic | Status: ACTIVE | Noted: 2018-01-01

## 2018-10-05 PROBLEM — D80.6 ANTI-POLYSACCHARIDE ANTIBODY DEFICIENCY: Chronic | Status: ACTIVE | Noted: 2018-01-01

## 2018-10-05 PROBLEM — J84.9 PULMONARY HYPERTENSION DUE TO INTERSTITIAL LUNG DISEASE: Chronic | Status: ACTIVE | Noted: 2018-01-01

## 2018-10-05 NOTE — TELEPHONE ENCOUNTER
----- Message from Caryn Peralta sent at 10/5/2018  9:50 AM CDT -----  Contact:    Swathi  [phone]  Needs Advice    Reason for call:  Discuss patient health with the Dr         Communication Preference:  Phone     Additional Information:

## 2018-10-05 NOTE — ED NOTES
"Pt informed last time he received Albuterol neb he had "sensitivity to light" but that he does not know for sure if was related he request a towel be placed over his eyes prior to neb treatment; after towel was placed he asked it be removed  "

## 2018-10-05 NOTE — ED PROVIDER NOTES
Encounter Date: 10/5/2018       History     Chief Complaint   Patient presents with    Shortness of Breath     Brought in by  for shortness of breath.  On EMS arrival patient was 60% on RA,  Patient is 91% on 15L     Patrick Dennis is a 58 y.o. male who  has a past medical history of Cough and IPF (idiopathic pulmonary fibrosis).    The patient presents to the ED due to shortness of breath.  He has history of ILD (on home oxygen 6 L), pulmonary HTN, and cor pulmonale.  Family reports earlier today, he was using the bathroom when he passed out.  He states he has been compliant with medications, but has had 2.5 lb weight gain over the last several days, and is noted his O2 saturation has been low despite increasing his supplemental O2 to 10 LPM.  He is currently on prednisone and Ofev.  There reports he was recently admitted tot [facility], and was discharged a few days ago.  He was supposed to follow up with his PCP, however, he has yet to make it to that appointment.  Denies any associated chest pain, fever, nausea/vomiting, sick contacts, or other recent illness.  He does report a dry, chronic cough.          Review of patient's allergies indicates:  No Known Allergies  Past Medical History:   Diagnosis Date    Cough     IPF (idiopathic pulmonary fibrosis)      History reviewed. No pertinent surgical history.  Family History   Problem Relation Age of Onset    No Known Problems Mother     No Known Problems Father     Asthma Other     Lung disease Paternal Uncle     Emphysema Neg Hx      Social History     Tobacco Use    Smoking status: Never Smoker    Smokeless tobacco: Never Used   Substance Use Topics    Alcohol use: No    Drug use: No     Review of Systems   Constitutional: Negative for chills and fever.   HENT: Negative for sore throat.    Respiratory: Positive for chest tightness and shortness of breath.    Cardiovascular: Positive for leg swelling. Negative for chest pain and  palpitations.   Gastrointestinal: Negative for abdominal pain, constipation, diarrhea, nausea and vomiting.   Genitourinary: Negative for dysuria, frequency and urgency.   Musculoskeletal: Negative for back pain.   Skin: Negative for rash and wound.   Neurological: Negative for weakness.   Hematological: Does not bruise/bleed easily.   Psychiatric/Behavioral: Negative for agitation, behavioral problems and confusion.       Physical Exam     Initial Vitals [10/05/18 1350]   BP Pulse Resp Temp SpO2   115/88 108 14 98.6 °F (37 °C) 98 %      MAP       --         Physical Exam    Nursing note and vitals reviewed.  Constitutional: He appears well-developed and well-nourished. He is not diaphoretic. No distress.   HENT:   Head: Normocephalic and atraumatic.   Mouth/Throat: Oropharynx is clear and moist.   Eyes: EOM are normal. Pupils are equal, round, and reactive to light.   Neck: No tracheal deviation present.   Cardiovascular: Normal rate, regular rhythm, normal heart sounds and intact distal pulses.   Pulmonary/Chest: Effort normal. No stridor. Tachypnea noted. No apnea and no bradypnea. No respiratory distress. He has decreased breath sounds in the right lower field and the left lower field. He has no wheezes. He has no rhonchi. He has rales in the right middle field, the right lower field, the left middle field and the left lower field.   NRB in place.   Abdominal: Soft. He exhibits no distension and no mass. There is no tenderness. There is no rigidity, no rebound, no guarding, no CVA tenderness, no tenderness at McBurney's point and negative Mendez's sign.   Obese abdomen, nontender.   Musculoskeletal: Normal range of motion. He exhibits no edema.   Neurological: He is alert and oriented to person, place, and time. No cranial nerve deficit or sensory deficit.   Skin: Skin is warm and dry. Capillary refill takes less than 2 seconds. No rash noted.   Psychiatric: He has a normal mood and affect. His behavior is  normal. Thought content normal.         ED Course   Procedures  Labs Reviewed   CBC W/ AUTO DIFFERENTIAL - Abnormal; Notable for the following components:       Result Value    MCHC 31.7 (*)     Platelets 147 (*)     Mono # 1.2 (*)     Lymph% 14.4 (*)     All other components within normal limits   COMPREHENSIVE METABOLIC PANEL - Abnormal; Notable for the following components:    Sodium 133 (*)     Chloride 84 (*)     CO2 36 (*)     Glucose 138 (*)     BUN, Bld 28 (*)     Total Bilirubin 2.2 (*)     All other components within normal limits   TROPONIN I - Abnormal; Notable for the following components:    Troponin I 0.027 (*)     All other components within normal limits   B-TYPE NATRIURETIC PEPTIDE - Abnormal; Notable for the following components:    BNP 1,110 (*)     All other components within normal limits   URINALYSIS, REFLEX TO URINE CULTURE     EKG Readings: (Independently Interpreted)   Sinus tachycardia, rate 109, nonspecific ST changes, no elevations or other signs of obvious ischemia, normal intervals.  Compared with prior EKG 09/2018, grossly stable without significant change.       Imaging Results          X-Ray Chest PA And Lateral (Final result)  Result time 10/05/18 15:59:09    Final result by Brenda Loaiza MD (10/05/18 15:59:09)                 Impression:      Bilateral patchy pulmonary opacities are present similar to prior exam consistent with chronic interstitial disease, possibly with superimposed infection or edema.      Electronically signed by: Brenda Loaiza MD  Date:    10/05/2018  Time:    15:59             Narrative:    EXAMINATION:  XR CHEST PA AND LATERAL    CLINICAL HISTORY:  Shortness of breath    TECHNIQUE:  PA and lateral views of the chest were performed.    COMPARISON:  Prior dated 09/26/2018    FINDINGS:  The mediastinal structures are midline.  The cardiac silhouette is not enlarged.    Lung volumes are low, limiting evaluation.  Bilateral patchy pulmonary opacities  are present similar to prior exam consistent with chronic interstitial disease, possibly with superimposed infection or edema.  No osseous abnormalities are identified.                                 Medical Decision Making:   History:   Old Medical Records: I decided to obtain old medical records.  Old Records Summarized: records from clinic visits, records from previous admission(s), records from another hospital and other records.       <> Summary of Records: Patient admitted 9/26-10/1 at [facility] for acute on chronic respiratory failure, started on nebulized breathing treatments q6h and began aggressive diuresis with 40 mg IV Lasix TID. He was originally placed on 10 liters O2 via high-flow NC but clinically improved with diuresis to home O2 requirements of 6 liters.  Initial Assessment:   58-year-old male with history of eye LD presents to ER via EMS from home after syncopal episode and shortness of breath.  EMS reports initial O2 sat 60%, improved to 98% on NRB.  Exam with pitting edema and bilateral basilar crackles.  Will continue supplemental O2, obtain labs, cardiac evaluation, give DuoNebs and Lasix, and reassess.  Differential Diagnosis:   Differential Diagnosis includes, but is not limited to:  PE, MI/ACS, pneumothorax, pericardial effusion/tamonade, pneumonia, lung abscess, pericarditis/myocarditis, pleural effusion, lung mass, CHF exacerbation, asthma exacerbation, COPD exacerbation, aspirated/ingested foreign body, airway obstruction, CO poisoning, anemia, metabolic derangement, allergy/atopy, influenza, viral URI, viral syndrome.    Clinical Tests:   Lab Tests: Ordered and Reviewed  Radiological Study: Ordered and Reviewed  Medical Tests: Reviewed and Ordered  ED Management:  EKG without ischemia.  CXR consistent with chronic interstitial lung disease.  Labs with elevated BNP, otherwise grossly stable from prior.    On reassessment, patient remains on non-rebreather, wean to 10 L O2 via nasal  cannula, and patient reports improvement in dyspnea.  No significant diuresis after IV Lasix.  Given persistent symptoms, O2 requirement, weight gain, and poor response to ED diuresis, will request admission for further management.  Upon re-evaluation, the patient's status has remained stable.  At this time, I believe the patient should be admitted to the hospital for further evaluation and management of SOB.    Dr. Osuna with [facility] was contacted and the case was discussed. The consulting physician agrees with plan and will admit under their service. Additional recommendations at this time: none    The patient and family were updated with test results, overall impression, and further plan of care. All questions were answered. The patient expressed understanding and agreed with the current plan.                          Clinical Impression:   The primary encounter diagnosis was Acute on chronic respiratory failure with hypoxia. Diagnoses of Shortness of breath and Acute on chronic congestive heart failure, unspecified heart failure type were also pertinent to this visit.                             Miguel Buchanan MD  10/05/18 4249

## 2018-10-05 NOTE — TELEPHONE ENCOUNTER
Patient awoke  Feeling really bad. Weight up 2.5 in last 2 days. He took 2 extra lasix today. This morning O2 sat as low as 64. Increased liter flow to 10.  Sat is improved now and is back to 8 liters. Please advise if needs to do anything different.

## 2018-10-05 NOTE — TELEPHONE ENCOUNTER
----- Message from Caryn Peralta sent at 10/5/2018  1:27 PM CDT -----  Contact:      Swathi   [phone]  Needs Advice    Reason for call:  Pt is in route to [facility] ,   Call back to discuss         Communication Preference:  Phone     Additional Information:

## 2018-10-06 PROBLEM — R10.12 LEFT UPPER QUADRANT PAIN: Status: ACTIVE | Noted: 2018-01-01

## 2018-10-06 NOTE — PLAN OF CARE
Problem: Patient Care Overview  Goal: Plan of Care Review  Plan of care reviewed with the patient. Verbalized reported of understanding. NSR to ST on monitor with no red alarms noted. NC on 10 LPM. SpO2 97%.  Left upper Abd. pain 5 out of 10. Heat pad and medications were provided. Patient refused. Simethicone was given. Pain was not released.  No acute distress noted. Bed on lowest position. Head of bed elevated.  Side rails x 2 are up. Patient will be monitor over night.

## 2018-10-06 NOTE — SUBJECTIVE & OBJECTIVE
Interval History: Developed significant left upper quadrant abdominal pain after urinating this morning.  Was able to eat.  Cannot take NSAIDs while on Ofev.    Review of Systems   Constitutional: Positive for chills. Negative for fever.   Respiratory: Positive for cough.         Sputum production   Gastrointestinal: Positive for abdominal pain. Negative for vomiting.     Objective:     Vital Signs (Most Recent):  Temp: 96.8 °F (36 °C) (10/06/18 1059)  Pulse: 103 (10/06/18 1200)  Resp: 19 (10/06/18 1059)  BP: 118/80 (10/06/18 1059)  SpO2: 95 % (10/06/18 1059) Vital Signs (24h Range):  Temp:  [96.6 °F (35.9 °C)-97.6 °F (36.4 °C)] 96.8 °F (36 °C)  Pulse:  [] 103  Resp:  [18-29] 19  SpO2:  [90 %-100 %] 95 %  BP: (106-126)/(77-86) 118/80     Weight: 107 kg (235 lb 14.3 oz)  Body mass index is 33.85 kg/m².    Intake/Output Summary (Last 24 hours) at 10/6/2018 1526  Last data filed at 10/6/2018 1457  Gross per 24 hour   Intake 1000 ml   Output 1275 ml   Net -275 ml      Physical Exam   Constitutional: He is oriented to person, place, and time. He appears well-developed. No distress. Nasal cannula in place.   Pulmonary/Chest: Effort normal. He has rales.   Abdominal: Soft. There is no guarding.   Neurological: He is alert and oriented to person, place, and time.   Psychiatric: He has a normal mood and affect.   Nursing note and vitals reviewed.      Significant Labs: All pertinent labs within the past 24 hours have been reviewed.    Significant Imaging: I have reviewed all pertinent imaging results/findings within the past 24 hours.

## 2018-10-06 NOTE — NURSING
Spoke with pharmacist about patient's medication ofev non formulary. Patients own medication to be at the bedside takes BID before meals.

## 2018-10-06 NOTE — ED NOTES
PT AWAITING ED TECH TO TRANSPORT PT TO FLOOR. RESPIRATIONS EVEN AND UNLABORED. TOLERATING HIGH FLOW NASAL CANNULA WELL. NADN AT THIS TIME. PT AND FAMILY UPDATED WITH POC. V/U.

## 2018-10-06 NOTE — PROGRESS NOTES
[facility] Medicine  Progress Note    Patient Name: Patrick Dennis  MRN: [id number]  Patient Class: IP- Inpatient   Admission Date: 10/5/2018  Length of Stay: 1 days  Attending Physician: Juan Osuna MD  Primary Care Provider: Primary Doctor No        Subjective:     Principal Problem:Acute on chronic right heart failure    HPI:  Patrick Dennis is a 58 y.o. 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  He takes nintedanib for his pulmonary fibrosis.  He lives in [place], [place].  He is currently staying with his mother in [place], [place] due to proximity to [facility], where he gets his medical care.  His pulmonologist is Dr. Alexis Oliver (formerly Dr. BAILEY Solorio).   He was hospitalized at [facility] from 9/26/18 to 10/1/18 for hypervolemia.  BNP was 1,389.  He was diuresed with furosemide 40 mg IV x 6 doses of 3 days then furosemide 80 mg by mouth x 5 doses over 3 days.  His supplemental oxygen was weaned to 8 liters, not quite his baseline.  He had diuresed to a net fluid status of negative 11,370 mL.  He was prescribed furosemide 80 mg once daily.     Over the next 4 days, he gained 2.5 lbs.  On 10/5/18, he passed out while using the bathroom.  His oxygen saturation was found to be in the 60s.  He also had epigastric abdominal pain and diarrhea.  His mother could not move him, so she called EMS.  He was taken to [facility].  BNP was 1,110.  Chest X-ray was similar to the one done on 9/26/18, with bilateral patchy pulmonary opacities.  He was given furosemide 40 mg IV to start diuresis, and  admitted by [facility].    Hospital Course:  He was put on furosemide drip and acetazolamide daily.  He had chills and felt that he had respiratory infection.    Interval History: Developed significant left upper quadrant abdominal pain after urinating this morning.  Was able to eat.  Cannot take NSAIDs while on Ofev.    Review of Systems   Constitutional: Positive for chills. Negative for fever.   Respiratory: Positive for cough.         Sputum production   Gastrointestinal: Positive for abdominal pain. Negative for vomiting.     Objective:     Vital Signs (Most Recent):  Temp: 96.8 °F (36 °C) (10/06/18 1059)  Pulse: 103 (10/06/18 1200)  Resp: 19 (10/06/18 1059)  BP: 118/80 (10/06/18 1059)  SpO2: 95 % (10/06/18 1059) Vital Signs (24h Range):  Temp:  [96.6 °F (35.9 °C)-97.6 °F (36.4 °C)] 96.8 °F (36 °C)  Pulse:  [] 103  Resp:  [18-29] 19  SpO2:  [90 %-100 %] 95 %  BP: (106-126)/(77-86) 118/80     Weight: 107 kg (235 lb 14.3 oz)  Body mass index is 33.85 kg/m².    Intake/Output Summary (Last 24 hours) at 10/6/2018 1526  Last data filed at 10/6/2018 1457  Gross per 24 hour   Intake 1000 ml   Output 1275 ml   Net -275 ml      Physical Exam   Constitutional: He is oriented to person, place, and time. He appears well-developed. No distress. Nasal cannula in place.   Pulmonary/Chest: Effort normal. He has rales.   Abdominal: Soft. There is no guarding.   Neurological: He is alert and oriented to person, place, and time.   Psychiatric: He has a normal mood and affect.   Nursing note and vitals reviewed.      Significant Labs: All pertinent labs within the past 24 hours have been reviewed.    Significant Imaging: I have reviewed all pertinent imaging results/findings within the past 24 hours.    Assessment/Plan:      * Acute on chronic right heart failure    Acute on chronic respiratory failure with hypoxia  Biatrial enlargement  Moderate tricuspid regurgitation  Cor pulmonale  Pulmonary hypertension  due to interstitial lung disease  Continue supplemental oxygen.  Takes furosemide 80 mg daily, which has not been sufficient at home.  Give furosemide drip and acetazolamide 500 mg PO daily to counter metabolic alkalosis.  He likely has obstructive sleep apnea/obesity hypoventilation syndrome, so BiPAP may help, but will let him follow up with his pulmonologist to go that route unless it is needed acutely while here in the hospital.  Goal SpO2 >88%.      With anti-polysaccharidase antibody deficiency, chills and sputum production, may have component of respiratory infection, so start moxifloxacin.     Consult Pulmonology for recommendations due to complex lung disease followed by Pulmonology outpatient.        Left upper quadrant pain    Try simethicone and calcium carbonate PRN.          IPF (idiopathic pulmonary fibrosis)    Continue home nontedanib 150 mg twice daily.          Anti-polysaccharide antibody deficiency    Gets IGG-PO-IGA every 28 days.          Chronic rhinitis    Uses fexofenadine as needed at home.          Morbid obesity due to excess calories    Is keeping him from being a lung transplant candidate.  Likely has obesity hypoventilation syndrome.              VTE Risk Mitigation (From admission, onward)        Ordered     enoxaparin injection 40 mg  Daily      10/05/18 2114     IP VTE HIGH RISK PATIENT  Once      10/05/18 2114              Juan Osuna MD  Department of Hospital Medicine   [facility]

## 2018-10-06 NOTE — SUBJECTIVE & OBJECTIVE
Past Medical History:   Diagnosis Date    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 No pertinent surgical history.    Review of patient's allergies indicates:  No Known Allergies    No current facility-administered medications on file prior to encounter.      Current Outpatient Medications on File Prior to Encounter   Medication Sig    acetaminophen (TYLENOL) 325 MG tablet Take 650 mg by mouth every 6 (six) hours as needed for Pain.    albuterol (PROVENTIL) 2.5 mg /3 mL (0.083 %) nebulizer solution Take 3 mLs (2.5 mg total) by nebulization every 6 (six) hours as needed for Shortness of Breath.    diphenhydrAMINE (BENADRYL) 25 mg capsule Take 1 tablet by mouth before infusion 2 times per month    fexofenadine (ALLEGRA) 180 MG tablet Take 180 mg by mouth daily as needed.     furosemide (LASIX) 20 MG tablet Take 4 tablets (80 mg total) by mouth once daily.    guaiFENesin (HUMIBID E) 400 mg Tab Take 400 mg by mouth 2 (two) times daily.    Immune Globulin G, IGG,-PRO-IGA 10 % injection, Privigen, 10 % Soln Inject 750 mLs (75 g total) into the vein every 28 days. (Patient taking differently: Inject 75 g into the vein every 28 days. Patient splits dose on 2 separate  days)    nintedanib (OFEV) 150 mg Cap Take 150 mg by mouth 2 (two) times daily.    potassium chloride SA (K-DUR,KLOR-CON) 10 MEQ tablet Take 1 tablet (10 mEq total) by mouth 2 (two) times daily. (Patient taking differently: Take 10 mEq by mouth once. )     Family History     Problem Relation (Age of Onset)    Asthma Other    Lung disease Paternal Uncle    No Known Problems Mother, Father        Tobacco Use    Smoking status: Never Smoker    Smokeless tobacco: Never Used   Substance and Sexual Activity    Alcohol use: No     Drug use: No    Sexual activity: Not on file     Review of Systems   Constitutional: Negative for chills and fever.   HENT: Negative for trouble swallowing and voice change.    Eyes: Negative for pain and redness.   Respiratory: Positive for cough (chronic sputum production) and shortness of breath.    Cardiovascular: Positive for leg swelling. Negative for chest pain.   Gastrointestinal: Positive for abdominal pain and diarrhea. Negative for vomiting.   Genitourinary: Negative for difficulty urinating and dysuria.   Musculoskeletal: Negative for neck pain and neck stiffness.   Skin: Negative for rash and wound.   Neurological: Positive for syncope. Negative for seizures.     Objective:     Vital Signs (Most Recent):  Temp: 98.6 °F (37 °C) (10/05/18 1350)  Pulse: 109 (10/05/18 1600)  Resp: (!) 23 (10/05/18 1600)  BP: 114/84 (10/05/18 1600)  SpO2: 97 % (10/05/18 1600) Vital Signs (24h Range):  Temp:  [98.6 °F (37 °C)] 98.6 °F (37 °C)  Pulse:  [107-109] 109  Resp:  [14-42] 23  SpO2:  [90 %-100 %] 97 %  BP: (113-115)/(84-88) 114/84     Weight: 115.2 kg (254 lb)  Body mass index is 36.45 kg/m².    Physical Exam   Constitutional: He is oriented to person, place, and time. He appears well-developed. No distress. Nasal cannula in place.   HENT:   Head: Normocephalic and atraumatic.   Eyes: Conjunctivae are normal. No scleral icterus.   Neck: Neck supple. JVD present.   Cardiovascular: Regular rhythm.   Murmur heard.   Systolic murmur is present with a grade of 4/6.  Pulmonary/Chest: No respiratory distress. He has rales.   Abdominal: Soft. He exhibits distension. There is no tenderness. There is no guarding.   Musculoskeletal: He exhibits edema (pitting in both legs).   Neurological: He is alert and oriented to person, place, and time.   Skin: Skin is warm and dry.   Psychiatric: He has a normal mood and affect.   Nursing note and vitals reviewed.          Significant Labs: All pertinent labs within the past 24 hours have  been reviewed.    Significant Imaging: I have reviewed all pertinent imaging results/findings within the past 24 hours.   X-Ray Chest PA And Lateral 10/05/18: FINDINGS:  The mediastinal structures are midline.  The cardiac silhouette is not enlarged.  Lung volumes are low, limiting evaluation.  Bilateral patchy pulmonary opacities are present similar to prior exam consistent with chronic interstitial disease, possibly with superimposed infection or edema.  No osseous abnormalities are identified.  Impression: Bilateral patchy pulmonary opacities are present similar to prior exam consistent with chronic interstitial disease, possibly with superimposed infection or edema.

## 2018-10-06 NOTE — ASSESSMENT & PLAN NOTE
Is keeping him from being a lung transplant candidate.  Likely has obesity hypoventilation syndrome.

## 2018-10-06 NOTE — ASSESSMENT & PLAN NOTE
Acute on chronic respiratory failure with hypoxia  Biatrial enlargement  Moderate tricuspid regurgitation  Cor pulmonale  Pulmonary hypertension due to interstitial lung disease  Continue supplemental oxygen.  Takes furosemide 80 mg daily, which has not been sufficient at home.  Give furosemide 40 mg IV three times daily and acetazolamide 500 mg PO daily to counter metabolic alkalosis.  He likely has obstructive sleep apnea/obesity hypoventilation syndrome, so BiPAP may help, but will let him follow up with his pulmonologist to go that route unless it is needed acutely while here in the hospital.  Goal SpO2 >88%.  Increase furosemide frequency on discharge.

## 2018-10-06 NOTE — NURSING
Patient complaining of chest pain left side constant aching pain 5/10. Started after he urinated. /81 HR 99 T97.6 O2 98%. Does not get better with coughing. Will continue to monitor patient.

## 2018-10-06 NOTE — ASSESSMENT & PLAN NOTE
Acute on chronic respiratory failure with hypoxia  Biatrial enlargement  Moderate tricuspid regurgitation  Cor pulmonale  Pulmonary hypertension due to interstitial lung disease  Continue supplemental oxygen.  Takes furosemide 80 mg daily, which has not been sufficient at home.  Give furosemide drip and acetazolamide 500 mg PO daily to counter metabolic alkalosis.  He likely has obstructive sleep apnea/obesity hypoventilation syndrome, so BiPAP may help, but will let him follow up with his pulmonologist to go that route unless it is needed acutely while here in the hospital.  Goal SpO2 >88%.      With anti-polysaccharidase antibody deficiency, chills and sputum production, may have component of respiratory infection, so start moxifloxacin.     Consult Pulmonology for recommendations due to complex lung disease followed by Pulmonology outpatient.

## 2018-10-06 NOTE — HOSPITAL COURSE
He was put on furosemide drip and acetazolamide daily.  He had subjective fever and chills and felt that he had respiratory infection.  Procalcitonin was only 0.04, but he was started on moxifloxacin.  Pulmonology was consulted for any additional recommendations, and they added scheduled nebulizer treatments and sputum culture.  He has worsening abdominal pain.  On 10/8/18 he was feeling more short of breath.  He was put on BiPAP.  He stopped breathing.  He required CPR and epinephrine and was resuscitated.  He coded again.  Pulmonology felt this was due to his advanced pulmonary fibrosis and pulmonary hypertension.  Pulmonology discussed with his family and withdrawal of care was determined to be in his best interest.  He was taken of vasopressors and  afterward.    Cause of death: Idiopathic pulmonary fibrosis  Time of death: 10/8/18 14:10

## 2018-10-06 NOTE — PLAN OF CARE
Problem: Patient Care Overview  Goal: Plan of Care Review  Outcome: Ongoing (interventions implemented as appropriate)  Cont t     To monitor resp needs   And maintain 02 sat     Pt. Does vera phan

## 2018-10-06 NOTE — H&P
[facility] Medicine  History & Physical    Patient Name: Patrick Dennis  MRN: [id number]  Admission Date: 10/5/2018  Attending Physician: Juan Osuna MD  Primary Care Provider: Primary Doctor No         Patient information was obtained from patient, parent, past medical records and ER records.     Subjective:     Principal Problem:Acute on chronic right heart failure    Chief Complaint:   Chief Complaint   Patient presents with    Shortness of Breath     Brought in by EJ for shortness of breath.  On EMS arrival patient was 60% on RA,  Patient is 91% on 15L        HPI: Patrick Dennis is a 58 y.o. 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  He takes nintedanib for his pulmonary fibrosis.  He lives in [place], [place].  He is currently staying with his mother in [place], [place] due to proximity to [facility], where he gets his medical care.  His pulmonologist is Dr. Alexis Oliver (formerly Dr. BAILEY Solorio).   He was hospitalized at [facility] from 9/26/18 to 10/1/18 for hypervolemia.  BNP was 1,389.  He was diuresed with furosemide 40 mg IV x 6 doses of 3 days then furosemide 80 mg by mouth x 5 doses over 3 days.  His supplemental oxygen was weaned to 8 liters, not quite his baseline.  He had diuresed to a net fluid status of negative 11,370 mL.  He was prescribed furosemide 80 mg once daily.     Over the next 4 days, he gained 2.5 lbs.  On 10/5/18, he passed out while using the bathroom.  His oxygen saturation was found to be in the 60s.  He also had epigastric abdominal pain and diarrhea.  His mother could not move  him, so she called EMS.  He was taken to [facility].  BNP was 1,110.  Chest X-ray was similar to the one done on 9/26/18, with bilateral patchy pulmonary opacities.  He was given furosemide 40 mg IV to start diuresis, and admitted by [facility].    Past Medical History:   Diagnosis Date    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 No pertinent surgical history.    Review of patient's allergies indicates:  No Known Allergies    No current facility-administered medications on file prior to encounter.      Current Outpatient Medications on File Prior to Encounter   Medication Sig    acetaminophen (TYLENOL) 325 MG tablet Take 650 mg by mouth every 6 (six) hours as needed for Pain.    albuterol (PROVENTIL) 2.5 mg /3 mL (0.083 %) nebulizer solution Take 3 mLs (2.5 mg total) by nebulization every 6 (six) hours as needed for Shortness of Breath.    diphenhydrAMINE (BENADRYL) 25 mg capsule Take 1 tablet by mouth before infusion 2 times per month    fexofenadine (ALLEGRA) 180 MG tablet Take 180 mg by mouth daily as needed.     furosemide (LASIX) 20 MG tablet Take 4 tablets (80 mg total) by mouth once daily.    guaiFENesin (HUMIBID E) 400 mg Tab Take 400 mg by mouth 2 (two) times daily.    Immune Globulin G, IGG,-PRO-IGA 10 % injection, Privigen, 10 % Soln Inject 750 mLs (75 g total) into the vein every 28 days. (Patient taking differently: Inject 75 g into the vein every 28 days. Patient splits dose on 2 separate  days)    nintedanib (OFEV) 150 mg Cap Take 150 mg by mouth 2 (two) times daily.    potassium chloride SA (K-DUR,KLOR-CON) 10 MEQ tablet Take 1 tablet (10 mEq total) by mouth 2 (two) times daily. (Patient taking differently: Take 10 mEq by mouth once.  )     Family History     Problem Relation (Age of Onset)    Asthma Other    Lung disease Paternal Uncle    No Known Problems Mother, Father        Tobacco Use    Smoking status: Never Smoker    Smokeless tobacco: Never Used   Substance and Sexual Activity    Alcohol use: No    Drug use: No    Sexual activity: Not on file     Review of Systems   Constitutional: Negative for chills and fever.   HENT: Negative for trouble swallowing and voice change.    Eyes: Negative for pain and redness.   Respiratory: Positive for cough (chronic sputum production) and shortness of breath.    Cardiovascular: Positive for leg swelling. Negative for chest pain.   Gastrointestinal: Positive for abdominal pain and diarrhea. Negative for vomiting.   Genitourinary: Negative for difficulty urinating and dysuria.   Musculoskeletal: Negative for neck pain and neck stiffness.   Skin: Negative for rash and wound.   Neurological: Positive for syncope. Negative for seizures.     Objective:     Vital Signs (Most Recent):  Temp: 98.6 °F (37 °C) (10/05/18 1350)  Pulse: 109 (10/05/18 1600)  Resp: (!) 23 (10/05/18 1600)  BP: 114/84 (10/05/18 1600)  SpO2: 97 % (10/05/18 1600) Vital Signs (24h Range):  Temp:  [98.6 °F (37 °C)] 98.6 °F (37 °C)  Pulse:  [107-109] 109  Resp:  [14-42] 23  SpO2:  [90 %-100 %] 97 %  BP: (113-115)/(84-88) 114/84     Weight: 115.2 kg (254 lb)  Body mass index is 36.45 kg/m².    Physical Exam   Constitutional: He is oriented to person, place, and time. He appears well-developed. No distress. Nasal cannula in place.   HENT:   Head: Normocephalic and atraumatic.   Eyes: Conjunctivae are normal. No scleral icterus.   Neck: Neck supple. JVD present.   Cardiovascular: Regular rhythm.   Murmur heard.   Systolic murmur is present with a grade of 4/6.  Pulmonary/Chest: No respiratory distress. He has rales.   Abdominal: Soft. He exhibits distension. There is no tenderness. There is no guarding.   Musculoskeletal: He exhibits edema  (pitting in both legs).   Neurological: He is alert and oriented to person, place, and time.   Skin: Skin is warm and dry.   Psychiatric: He has a normal mood and affect.   Nursing note and vitals reviewed.          Significant Labs: All pertinent labs within the past 24 hours have been reviewed.    Significant Imaging: I have reviewed all pertinent imaging results/findings within the past 24 hours.   X-Ray Chest PA And Lateral 10/05/18: FINDINGS:  The mediastinal structures are midline.  The cardiac silhouette is not enlarged.  Lung volumes are low, limiting evaluation.  Bilateral patchy pulmonary opacities are present similar to prior exam consistent with chronic interstitial disease, possibly with superimposed infection or edema.  No osseous abnormalities are identified.  Impression: Bilateral patchy pulmonary opacities are present similar to prior exam consistent with chronic interstitial disease, possibly with superimposed infection or edema.    Assessment/Plan:     * Acute on chronic right heart failure    Acute on chronic respiratory failure with hypoxia  Biatrial enlargement  Moderate tricuspid regurgitation  Cor pulmonale  Pulmonary hypertension due to interstitial lung disease  Continue supplemental oxygen.  Takes furosemide 80 mg daily, which has not been sufficient at home.  Give furosemide 40 mg IV three times daily and acetazolamide 500 mg PO daily to counter metabolic alkalosis.  He likely has obstructive sleep apnea/obesity hypoventilation syndrome, so BiPAP may help, but will let him follow up with his pulmonologist to go that route unless it is needed acutely while here in the hospital.  Goal SpO2 >88%.  Increase furosemide frequency on discharge.        IPF (idiopathic pulmonary fibrosis)    Continue home nontedanib 150 mg twice daily.          Anti-polysaccharide antibody deficiency    Gets IGG-PO-IGA every 28 days.          Chronic rhinitis    Uses fexofenadine as needed at home.          Morbid  obesity due to excess calories    Is keeping him from being a lung transplant candidate.  Likely has obesity hypoventilation syndrome.              VTE Risk Mitigation (From admission, onward)    None             Juan Osuna MD  Department of Hospital Medicine   [facility]

## 2018-10-06 NOTE — HPI
Patrick Dennis is a 58 y.o. 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  He takes nintedanib for his pulmonary fibrosis.  He lives in [place], [place].  He is currently staying with his mother in [place], [place] due to proximity to [facility], where he gets his medical care.  His pulmonologist is Dr. Alexis Oliver (formerly Dr. BAILEY Solorio).  He had been deemed not to be a candidate for lung transplant due to poor functional status.   He was hospitalized at [facility] from 9/26/18 to 10/1/18 for hypervolemia.  BNP was 1,389.  He was diuresed with furosemide 40 mg IV x 6 doses of 3 days then furosemide 80 mg by mouth x 5 doses over 3 days.  His supplemental oxygen was weaned to 8 liters, not quite his baseline.  He had diuresed to a net fluid status of negative 11,370 mL.  He was prescribed furosemide 80 mg once daily.     Over the next 4 days, he gained 2.5 lbs.  On 10/5/18, he passed out while using the bathroom.  His oxygen saturation was found to be in the 60s.  He also had epigastric abdominal pain and diarrhea.  His mother could not move him, so she called EMS.  He was taken to [facility].  BNP was 1,110.  Chest X-ray was similar to the one done on 9/26/18, with bilateral patchy pulmonary opacities.  He was given furosemide 40 mg IV to start diuresis, and admitted by [facility].

## 2018-10-07 PROBLEM — J96.12 CHRONIC RESPIRATORY FAILURE WITH HYPOXIA AND HYPERCAPNIA: Chronic | Status: ACTIVE | Noted: 2018-01-01

## 2018-10-07 PROBLEM — J96.11 CHRONIC RESPIRATORY FAILURE WITH HYPOXIA AND HYPERCAPNIA: Chronic | Status: ACTIVE | Noted: 2018-01-01

## 2018-10-07 NOTE — PLAN OF CARE
"  Chief Complaint   Patient presents with    Shortness of Breath       Brought in by EJ for shortness of breath.  On EMS arrival patient was 60% on RA,  Patient is 91% on 15L       Pt recently discharged from [facility], 9/26--10/1/18, IP, Hypoxia    Assessment information obtained bedside from patient, mother: Swathi Dennis and sister Susan Fung.    No HH, has Tub Bench and O2- tanks and concentrator ([employer]). [place] informed family that they would need to bring pt's O2 for pt to transport home with on discharge. Pt has home in [place] and one in [facility] with his mother who he is staying with currently. Sister lives 7 doors down and is available to assist when needed. Pt has been able to perform very limited ADLs and needs "a good bit" of assist. Family provides transportation for pt.    [place] provided pt with Discharge Planning Brochure and Business Card and wrote name on whiteboard        Future Appointments   Date Time Provider Department Center   10/25/2018 11:00 AM [facility]          10/06/18 7270   Discharge Assessment   Assessment Type Discharge Planning Assessment   Confirmed/corrected address and phone number on facesheet? Yes   Assessment information obtained from? Caregiver  (mother Swathi Dennis [phone])   Expected Length of Stay (days) 3   Communicated expected length of stay with patient/caregiver yes   Prior to hospitilization cognitive status: Alert/Oriented   Prior to hospitalization functional status: Needs Assistance;Assistive Equipment   Current cognitive status: Alert/Oriented   Current Functional Status: Needs Assistance;Assistive Equipment   Facility Arrived From: (home)   Lives With parent(s)  (mother: Maricruz Dennis [phone])   Able to Return to Prior Arrangements yes   Is patient able to care for self after discharge? Yes   Who are your caregiver(s) and their phone number(s)? mother: Maricruz Dennis [phone]   Patient's perception of discharge " disposition home or selfcare   Readmission Within The Last 30 Days other (see comments)  (Last admitted [facility] 9/26--10/1/18, IP, Hypoxia)   Patient currently being followed by outpatient case management? No   Patient currently receives any other outside agency services? Yes   Name and contact number of agency or person providing outside services infusion services   Is it the patient/care giver preference to resume care with the current outside agency? Yes   Equipment Currently Used at Home bath bench;oxygen   Do you have any problems affording any of your prescribed medications? No   Is the patient taking medications as prescribed? yes   Does the patient have transportation home? Yes   Transportation Available family or friend will provide   Dialysis Name and Scheduled days N/A   Does the patient receive services at the [facility]? No   Discharge Plan A Home   Discharge Plan B Home with family   Patient/Family In Agreement With Plan yes   Does the patient have family/friends to help with healtcare needs after discharge? yes   Does the patient have transportation to healthcare appointments? Yes   Readmission Questionnaire   Living Arrangements house   At the time of discharge, did someone talk to you about what to watch out for regarding worsening of your health problem? Yes   Have you felt down, depressed, or hopeless? 0   Have you felt little interest or pleasure in doing things? 0   At the time of your discharge, did someone talk to you about what your health problems were? Yes   At the time of discharge, did someone talk to you about what to do if you experienced worsening of your health problem? Yes   At the time of discharge, did someone talk to you about which medication to take when you left the hospital and which ones to stop taking? Yes   At the time of discharge, did someone talk to you about when and where to follow up with a doctor after you left the hospital? Yes   What do you believe caused you  to be sick enough to be re-admitted? SOB   How often do you need to have someone help you when you read instructions, pamphlets, or other written material from your doctor or pharmacy? Rarely   Do you have problems taking your medications as prescribed? No   Do you have problems obtaining/receiving your medications? No   Does your caregiver provide all the help you need? Yes   Are you currently feeling confused? No   Are you currently having problems thinking? No   Are you currently having memory problems? No   In the last 7 days, my sleep quality was: fair   Do you have any problems affording any of  your prescribed medications? No     Paola Allen RN Transitional Navigator  [phone]

## 2018-10-07 NOTE — PLAN OF CARE
Problem: Patient Care Overview  Goal: Plan of Care Review  Outcome: Ongoing (interventions implemented as appropriate)  Plan of care reviewed with patient, understanding verbalized.  Pt remains SR on tele. Bed alarm on, call light in reach, fall precautions in place.  Will continue to monitor.

## 2018-10-07 NOTE — PROGRESS NOTES
[facility]  Progress Note    Patient Name: Patrick Dennis  MRN: [id number]  Patient Class: IP- Inpatient   Admission Date: 10/5/2018  Length of Stay: 2 days  Attending Physician: Juan Osuna MD  Primary Care Provider: Primary Doctor No        Subjective:     Principal Problem:Acute on chronic right heart failure    HPI:  Patrick Dennis is a 58 y.o. 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  He takes nintedanib for his pulmonary fibrosis.  He lives in [place], [place].  He is currently staying with his mother in [place], [place] due to proximity to [facility], where he gets his medical care.  His pulmonologist is Dr. Alexis Oliver (formerly Dr. BAILEY Solorio).   He was hospitalized at [facility] from 9/26/18 to 10/1/18 for hypervolemia.  BNP was 1,389.  He was diuresed with furosemide 40 mg IV x 6 doses of 3 days then furosemide 80 mg by mouth x 5 doses over 3 days.  His supplemental oxygen was weaned to 8 liters, not quite his baseline.  He had diuresed to a net fluid status of negative 11,370 mL.  He was prescribed furosemide 80 mg once daily.     Over the next 4 days, he gained 2.5 lbs.  On 10/5/18, he passed out while using the bathroom.  His oxygen saturation was found to be in the 60s.  He also had epigastric abdominal pain and diarrhea.  His mother could not move him, so she called EMS.  He was taken to [facility].  BNP was 1,110.  Chest X-ray was similar to the one done on 9/26/18, with bilateral patchy pulmonary opacities.  He was given furosemide 40 mg IV to start diuresis, and  admitted by [facility].    Hospital Course:  He was put on furosemide drip and acetazolamide daily.  He had subjective fever and chills and felt that he had respiratory infection.  Procalcitonin was only 0.04, but he was started on moxifloxacin.  Pulmonology was consulted for any additional recommendations, and they added scheduled nebulizer treatments and sputum culture.      Interval History: His mother noticed overnight that he became beet red.  He felt hot but his skin felt cold when she touched him.    Review of Systems   Constitutional: Positive for chills and fever.   Respiratory: Positive for cough.    Gastrointestinal: Negative for nausea and vomiting.     Objective:     Vital Signs (Most Recent):  Temp: 98.7 °F (37.1 °C) (10/07/18 0916)  Pulse: 108 (10/07/18 1200)  Resp: (!) 25 (10/07/18 0759)  BP: 103/73 (10/07/18 0741)  SpO2: (!) 94 % (10/07/18 0759) Vital Signs (24h Range):  Temp:  [96.7 °F (35.9 °C)-98.7 °F (37.1 °C)] 98.7 °F (37.1 °C)  Pulse:  [100-109] 108  Resp:  [18-25] 25  SpO2:  [92 %-97 %] 94 %  BP: ()/(73-86) 103/73     Weight: 107 kg (235 lb 14.3 oz)  Body mass index is 33.85 kg/m².    Intake/Output Summary (Last 24 hours) at 10/7/2018 1213  Last data filed at 10/7/2018 1000  Gross per 24 hour   Intake 944.17 ml   Output 1625 ml   Net -680.83 ml      Physical Exam   Constitutional: He is oriented to person, place, and time. He appears well-developed. No distress. Nasal cannula in place.   Pulmonary/Chest: No respiratory distress. He has rales.   Neurological: He is alert and oriented to person, place, and time.   Psychiatric: He has a normal mood and affect.   Nursing note and vitals reviewed.      Significant Labs: All pertinent labs within the past 24 hours have been reviewed.    Significant Imaging: I have reviewed all pertinent imaging results/findings within the past 24 hours.    Assessment/Plan:      * Acute on chronic right heart failure    Acute on chronic  respiratory failure with hypoxia  Chronic respiratory failure with hypoxia and hypercapnia  Biatrial enlargement  Moderate tricuspid regurgitation  Cor pulmonale  Pulmonary hypertension due to interstitial lung disease  Continue supplemental oxygen.  Takes furosemide 80 mg daily, which has not been sufficient at home.  Giving furosemide drip and acetazolamide 500 mg PO BID.  Giving nebulizer treatments.  Goal SpO2 >88%.      With anti-polysaccharidase antibody deficiency, chills and sputum production, may have component of respiratory infection, so started moxifloxacin.  Culture sputum.    Appreciate Pulmonology evaluation and recommendations.        Left upper quadrant pain    Try simethicone and calcium carbonate PRN.          IPF (idiopathic pulmonary fibrosis)    Continue home nontedanib 150 mg twice daily.          Anti-polysaccharide antibody deficiency    Gets IGG-PO-IGA every 28 days.          Chronic rhinitis    Uses fexofenadine as needed at home.          Morbid obesity due to excess calories    Is keeping him from being a lung transplant candidate.  Likely has obesity hypoventilation syndrome.              VTE Risk Mitigation (From admission, onward)        Ordered     enoxaparin injection 40 mg  Daily      10/05/18 2114     IP VTE HIGH RISK PATIENT  Once      10/05/18 2114              Juan Osuna MD  Department of Hospital Medicine   [facility]

## 2018-10-07 NOTE — SUBJECTIVE & OBJECTIVE
Interval History: His mother noticed overnight that he became beet red.  He felt hot but his skin felt cold when she touched him.    Review of Systems   Constitutional: Positive for chills and fever.   Respiratory: Positive for cough.    Gastrointestinal: Negative for nausea and vomiting.     Objective:     Vital Signs (Most Recent):  Temp: 98.7 °F (37.1 °C) (10/07/18 0916)  Pulse: 108 (10/07/18 1200)  Resp: (!) 25 (10/07/18 0759)  BP: 103/73 (10/07/18 0741)  SpO2: (!) 94 % (10/07/18 0759) Vital Signs (24h Range):  Temp:  [96.7 °F (35.9 °C)-98.7 °F (37.1 °C)] 98.7 °F (37.1 °C)  Pulse:  [100-109] 108  Resp:  [18-25] 25  SpO2:  [92 %-97 %] 94 %  BP: ()/(73-86) 103/73     Weight: 107 kg (235 lb 14.3 oz)  Body mass index is 33.85 kg/m².    Intake/Output Summary (Last 24 hours) at 10/7/2018 1213  Last data filed at 10/7/2018 1000  Gross per 24 hour   Intake 944.17 ml   Output 1625 ml   Net -680.83 ml      Physical Exam   Constitutional: He is oriented to person, place, and time. He appears well-developed. No distress. Nasal cannula in place.   Pulmonary/Chest: No respiratory distress. He has rales.   Neurological: He is alert and oriented to person, place, and time.   Psychiatric: He has a normal mood and affect.   Nursing note and vitals reviewed.      Significant Labs: All pertinent labs within the past 24 hours have been reviewed.    Significant Imaging: I have reviewed all pertinent imaging results/findings within the past 24 hours.

## 2018-10-07 NOTE — CONSULTS
[facility] Consult Note    Attending Physician: Dr. Osuna  Consulting Physician: Dr. Kong  Fellow: Dr. Frazier    Date of Admit: 10/5/2018    Reason for Consultation     Acute on chronic hypoxic respiratory failure, pHTN, ILD    Subjective:      History of Present Illness:  Patrick Dennis is a 58 y.o.  male with a PMH significant for ILD (IPF vs chronic HP) on home O2 (~6-8L) and Ofev, severe pHTN, poly-saccharide ab deficiency, obesity who presents to [facility] with acute on chronic hypoxic respiratory failure.  Patient was recently admitted to [facility] with volume overload and discharged on 10/1 after diuresis (11L negative).  Reports he felt back to his respiratory baseline.  He reports starting to feel more dyspneic on Thursday and then had a syncopal episode while on the toilet which prompted a return to the ED on 10/5.  He was discharged on 80mg of Lasix daily.  He gained 2.5lbs over the few days since dishcarge.  He reports an episode of diarrhea as well.  Was on 15L of O2 in the ED and was started on diuretics.  O2 requirement has been improving since admission.  Does endorse some green sputum production since this morning.  No fevers, chills, chest pain.  Feels worn out today.  He has no nausea, vomiting, chest pain.  He is presently back down to 8L NC.  Of note, he has been evaluated for lung transplantation and had too poor a 6MWT to qualify for listing (only 50 feet) and his BMI was out of bounds as well.  Denies any overt reflux sx but does report frequent belching after eating.  Does endorse sx of PND, however.  Only takes Allegra periodically for this.  Got steroids x 1 in the ED but none since.    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 No pertinent surgical history.    Allergies:  Review of patient's allergies indicates:  No Known Allergies    Home Medications:  Prior to Admission medications    Medication Sig Start Date End Date Taking? Authorizing Provider   acetaminophen (TYLENOL) 325 MG tablet Take 650 mg by mouth every 6 (six) hours as needed for Pain.    Historical Provider, MD   albuterol (PROVENTIL) 2.5 mg /3 mL (0.083 %) nebulizer solution Take 3 mLs (2.5 mg total) by nebulization every 6 (six) hours as needed for Shortness of Breath. 8/20/18 9/26/18  BAILEY Solorio MD   diphenhydrAMINE (BENADRYL) 25 mg capsule Take 1 tablet by mouth before infusion 2 times per month    Historical Provider, MD   fexofenadine (ALLEGRA) 180 MG tablet Take 180 mg by mouth daily as needed.     Historical Provider, MD   furosemide (LASIX) 20 MG tablet Take 4 tablets (80 mg total) by mouth once daily. 10/1/18   Alejandro Walter MD   guaiFENesin (HUMIBID E) 400 mg Tab Take 400 mg by mouth 2 (two) times daily.    Historical Provider, MD   Immune Globulin G, IGG,-PRO-IGA 10 % injection, Privigen, 10 % Soln Inject 750 mLs (75 g total) into the vein every 28 days.  Patient taking differently: Inject 75 g into the vein every 28 days. Patient splits dose on 2 separate  days 7/5/18   Waqar Yan MD   nintedanib (OFEV) 150 mg Cap Take 150 mg by mouth 2 (two) times daily. 5/11/18   BAILEY Solorio MD   potassium chloride SA (K-DUR,KLOR-CON) 10 MEQ tablet Take 1 tablet (10 mEq total) by mouth 2 (two) times daily.  Patient taking differently: Take 10 mEq by mouth once.  8/20/18   BAILEY Solorio MD       Family History:  Family History   Problem Relation Age of Onset    No Known Problems Mother     No Known Problems Father     Asthma Other     Lung disease Paternal Uncle     Emphysema Neg Hx        Social History:  Social History     Tobacco Use     "Smoking status: Never Smoker    Smokeless tobacco: Never Used   Substance Use Topics    Alcohol use: No    Drug use: No       Review of Systems:  As per HPI, otherwise negative.     Objective:   Last 24 Hour Vital Signs:  Temp:  [96.6 °F (35.9 °C)-98 °F (36.7 °C)] 96.8 °F (36 °C)  Pulse:  [] 107  Resp:  [18-20] 20  SpO2:  [92 %-98 %] 97 %  BP: (111-126)/(74-86) 112/74    Physical Examination:  Vitals: /74   Pulse 107   Temp 96.8 °F (36 °C)   Resp 20   Ht 5' 10" (1.778 m)   Wt 107 kg (235 lb 14.3 oz)   SpO2 97%   BMI 33.85 kg/m²      General: Alert, no acute distress, minimally increased WOB with prolonged conversation  HEENT: NC in place, MMM, +JVD   Cardiovascular: RRR, S1/S2, no MRG  Chest: Decreased BS bilaterally with crackles at the bases  Abdomen: Soft, nondistended, nontender, NABS, no organomegaly  Extremities: Trace edema of BLE  Pulses: 2+ in all extremities  Neurologic:  Moves all extremities    Laboratory:  Trended Lab Data:  Recent Labs   Lab  09/30/18   0710  10/01/18   0635  10/05/18   1431   WBC  9.56  8.60  8.67   HGB  15.3  15.1  15.7   HCT  50.7  49.2  49.6   PLT  151  158  147*       Recent Labs   Lab  09/30/18   0710   09/30/18   1855   10/01/18   0635  10/01/18   0823  10/05/18   1431  10/06/18   0506   NA   --    < >   --    < >   --   136  133*  132*   K   --    < >   --    < >   --   3.7  3.9  4.7   CL   --    < >   --    < >   --   84*  84*  84*   CO2   --    < >   --    < >   --   44*  36*  36*   BUN   --    < >   --    < >   --   18  28*  33*   CREATININE   --    < >   --    < >   --   0.8  1.0  1.1   GLU   --    < >   --    < >   --   103  138*  151*   CALCIUM   --    < >   --    < >   --   9.8  9.7  10.2   MG   --    < >  1.9   --    --   1.9   --   1.9   PHOS  4.3   --    --    --   3.8   --    --    --     < > = values in this interval not displayed.       Recent Labs   Lab  10/05/18   1431   PROT  7.7   ALBUMIN  3.5   BILITOT  2.2*   AST  24   ALT  27   ALKPHOS  " 75       No results for input(s): PROTIME, PTT, INR in the last 168 hours.    Cardiac:   Recent Labs   Lab  10/05/18   1431   TROPONINI  0.027*   BNP  1,110*       FLP: No results found for: CHOL, HDL, LDLCALC, TRIG, CHOLHDL  DM:   Lab Results   Component Value Date    HGBA1C 5.9 (H) 09/26/2018    CREATININE 1.1 10/06/2018     Thyroid:   Lab Results   Component Value Date    TSH 2.973 09/26/2018     Anemia: No results found for: IRON, TIBC, FERRITIN, [id number], FOLATE  Urinalysis:   Lab Results   Component Value Date    COLORU Yellow 10/05/2018    SPECGRAV 1.020 10/05/2018    NITRITE Negative 10/05/2018    KETONESU Negative 10/05/2018    UROBILINOGEN 1.0 10/05/2018         Microbiology Data:  Microbiology Results (last 7 days)     Procedure Component Value Units Date/Time    Culture, Respiratory with Gram Stain [[id number]]     Order Status:  No result Specimen:  Respiratory       Current Medications:     Infusions:   furosemide (LASIX) 2 mg/mL infusion (non-titrating) 10 mg/hr (10/06/18 0910)        Scheduled:   acetaZOLAMIDE  500 mg Oral Daily    albuterol-ipratropium  3 mL Nebulization Q6H    azelastine  1 spray Nasal BID    enoxaparin  40 mg Subcutaneous Daily    fluticasone  2 spray Each Nare BID    guaiFENesin  600 mg Oral BID    moxifloxacin  400 mg Oral Daily    nintedanib  150 mg Oral BID WM    pantoprazole  40 mg Oral Daily        PRN:  acetaminophen, calcium carbonate, dextrose 50%, dextrose 50%, glucagon (human recombinant), glucose, glucose, ondansetron, simethicone, sodium chloride 0.9%     Assessment:     Patrick Dennis is a 58 y.o. 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 with acute on chronic hypoxic respiratory failure.  Suspect syncope related to worsening pHTN.  Hypoxia improving with diuresis.  Has some colored sputum and diarrhea.  Possible he has a viral illness.  Started on abx.    - Continue diuresis with close monitoring of Creatinine, will repeat 2D ECHO for prognostication  - Agree w/ Moxifloxacin, checking sputum culture if he is able to expectorate any  - Scheduled Duonebs  - Has equivocal sx of GERD - starting on PPI, has clear PND sx, starting on nasal steroids/antihistamines  - Needs aggressive Pulmonary Rehab as an outpatient - have ordered PT for him  - Do not strongly feel he needs further steroids  - Continue OFEV  - At risk for ROBBIE./OHS though he denies any sx - has chronically elevated Bicarb - will check blood gas    Mike [facility] Fellow

## 2018-10-07 NOTE — PLAN OF CARE
10/06/18 1834   Readmission Questionnaire   At the time of your discharge, did someone talk to you about what your health problems were? Yes   At the time of discharge, did someone talk to you about what to watch out for regarding worsening of your health problem? Yes   At the time of discharge, did someone talk to you about what to do if you experienced worsening of your health problem? Yes   At the time of discharge, did someone talk to you about which medication to take when you left the hospital and which ones to stop taking? Yes   At the time of discharge, did someone talk to you about when and where to follow up with a doctor after you left the hospital? Yes   What do you believe caused you to be sick enough to be re-admitted? SOB   How often do you need to have someone help you when you read instructions, pamphlets, or other written material from your doctor or pharmacy? Rarely   Do you have problems taking your medications as prescribed? No   Do you have any problems affording any of  your prescribed medications? No   Do you have problems obtaining/receiving your medications? No   Does the patient have transportation to healthcare appointments? Yes   Lives With parent(s)  (mother: Maricruz Dennis [phone])   Living Arrangements house   Does the patient have family/friends to help with healtcare needs after discharge? yes   Who are your caregiver(s) and their phone number(s)? mother: Maricruz Dennis [phone]   Does your caregiver provide all the help you need? Yes   Are you currently feeling confused? No   Are you currently having problems thinking? No   Are you currently having memory problems? No   Have you felt down, depressed, or hopeless? 0   Have you felt little interest or pleasure in doing things? 0   In the last 7 days, my sleep quality was: barbie Allen RN Transitional Navigator  [phone]

## 2018-10-07 NOTE — PT/OT/SLP EVAL
Physical Therapy Evaluation    Patient Name:  Patrick Dennis   MRN:  [id number]    Recommendations:     Discharge Recommendations:  home health PT(with goal of pulmonary rehab once stable)   Discharge Equipment Recommendations: rollator, wheelchair, bedside commode   Barriers to discharge: Decreased caregiver support    Assessment:     Patrick Dennis is a 58 y.o. male admitted with a medical diagnosis of Acute on chronic right heart failure.  He presents with the following impairments/functional limitations:  impaired functional mobilty, weakness, impaired endurance, impaired balance, impaired self care skills, gait instability, impaired cardiopulmonary response to activity . Pt was recently discharged from acute care. Was at home and had syncopal episode while urinating. Pt had not received HH yet that had been recommended, not had he received a RW or wheelchair. Pt's potential for participation in adl's and functional mobiltiy is significantly impacted by his respiratory status. Needs energy conservation and DME to assist as he progresses through the rehabilitation process.    Rehab Prognosis:  fair; patient would benefit from acute skilled PT services to address these deficits and reach maximum level of function.      Recent Surgery: * No surgery found *      Plan:     During this hospitalization, patient to be seen 6 x/week to address the above listed problems via therapeutic activities, gait training, therapeutic exercises  · Plan of Care Expires:  11/07/18   Plan of Care Reviewed with: patient, mother    Subjective     Communicated with RN prior to session.  Patient found sidelying, with mother at bedside, upon PT entry to room, agreeable to evaluation.      Chief Complaint: wants to see if he is strong enough to  order to use the commode when needed  Patient comments/goals: long term goal is to go to outpatient pulmonary rehab program, but mother is concerned with transportation  resources to and from as she herself has cataracts which limit her ability to drive and pt's sister has to take time off of work to be able to drive him to appts  Pain/Comfort:  · Pain Rating 1: 0/10    Patients cultural, spiritual, Mu-ism conflicts given the current situation: none    Living Environment:  Lives with his mother in a single story home, threshold step entry, tub/shower combo. Has TTB, but has been sponge bathing due to not strong enough at this time to get into the tub. When he bathes he does endorse wearing his o2. Reports that he wears his o2 at home, 8-10L. Monitors o2 sats via his own oximeter.    .  Patient has the following equipment: bath bench.  DME owned (not currently used): none.  Upon discharge, patient will have assistance from mother and we are requesting  PT .    Objective:     Patient found with: telemetry, peripheral IV, pulse ox (continuous), oxygen     General Precautions: Standard, respiratory, fall   Orthopedic Precautions:    Braces: N/A     Exams:  · Cognitive Exam:  Patient is oriented to Person, Place, Time and Situation  · Gross Motor Coordination:  WFL  · Postural Exam:  Patient presented with the following abnormalities:    · -       Rounded shoulders  · -       Forward head  · Sensation:    · -       Intact  BLE ROm and strength WFL, no resistive mms due to poor respiratory status at this time. Pt with notable increased work of breathing with just talking to therapist  · Bed Mobility:     · Scooting: stand by assistance  · Supine to Sit: stand by assistance and contact guard assistance  · Sit to Supine: stand by assistance and contact guard assistance  · Transfers:     · Sit to Stand:  contact guard assistance with rolling walkerx 2 trials with prolonged seated rest break between trials  · Balance: fair+ static stand at RW    AM-PAC 6 CLICK MOBILITY  Total Score:17       Therapeutic Activities and Exercises:   Pt tolerated sitting EOB x 20 minutes with seated rest  breaks between 2 trials of standing. O2 sats monitored:  90-97% at rest on 9L o2, dropped to 86% upon sitting. After 2 trials at standing, 74-77%. O2 titrated to 14L. Prolonged recovery time to get back to 86%.  Returned to supine, o2 sats at 89%, o2 returned to 9L. Values communicated to RN.  Pt also requested to be weighed. Bed was zeroed out while pt in standing, pt weighed 108.9 kg (with slippers on).    Patient left supine with all lines intact, call button in reach and bed alarm on.    GOALS:   Multidisciplinary Problems     Physical Therapy Goals        Problem: Physical Therapy Goal    Goal Priority Disciplines Outcome Goal Variances Interventions   Physical Therapy Goal     PT, PT/OT Ongoing (interventions implemented as appropriate)     Description:  Goals to be met by: 10/31/18     Patient will increase functional independence with mobility by performing:    Pt to perform bed mobility independently  Pt to perform transfers sit to stand and spt to bedside commode with spvn while maintaining o2 sats >88%  Pt to ambulate with RW, 20' with o2 sats 88-92% while on o2 as needed  Pt to perform general LE TE for ROM, not resistive x10 repsBLE                      History:     Past Medical History:   Diagnosis Date    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 No pertinent surgical history.    Clinical Decision Making:     History  Co-morbidities and personal factors that may impact the plan of care Examination  Body Structures and Functions, activity limitations and participation restrictions that may impact the plan of care Clinical Presentation   Decision Making/ Complexity Score   Co-morbidities:   [] Time since onset of injury / illness / exacerbation  [x] Status of current  condition  []Patient's cognitive status and safety concerns    [x] Multiple Medical Problems (see med hx)  Personal Factors:   [] Patient's age  [x] Prior Level of function   [x] Patient's home situation (environment and family support)  [] Patient's level of motivation  [x] Expected progression of patient      HISTORY:(criteria)    [] 76280 - no personal factors/history    [] 70518 - has 1-2 personal factor/comorbidity     [x] 40155 - has >3 personal factor/comorbidity     Body Regions:  [] Objective examination findings  [] Head     []  Neck  [] Trunk   [] Upper Extremity  [] Lower Extremity    Body Systems:  [] For communication ability, affect, cognition, language, and learning style: the assessment of the ability to make needs known, consciousness, orientation (person, place, and time), expected emotional /behavioral responses, and learning preferences (eg, learning barriers, education  needs)  [] For the neuromuscular system: a general assessment of gross coordinated movement (eg, balance, gait, locomotion, transfers, and transitions) and motor function  (motor control and motor learning)  [] For the musculoskeletal system: the assessment of gross symmetry, gross range of motion, gross strength, height, and weight  [] For the integumentary system: the assessment of pliability(texture), presence of scar formation, skin color, and skin integrity  [x] For cardiovascular/pulmonary system: the assessment of heart rate, respiratory rate, blood pressure, and edema     Activity limitations:    [] Patient's cognitive status and saf ety concerns          [x] Status of current condition      [] Weight bearing restriction  [x] Cardiopulmunary Restriction    Participation Restrictions:   [] Goals and goal agreement with the patient     [x] Rehab potential (prognosis) and probable outcome      Examination of Body System: (criteria)    [] 14103 - addressing 1-2 elements    [] 68069 - addressing a total of 3 or more elements      [x] 72597 -  Addressing a total of 4 or more elements         Clinical Presentation: (criteria)  Evolving - 80902     On examination of body system using standardized tests and measures patient presents with 3 or more elements from any of the following: body structures and functions, activity limitations, and/or participation restrictions.  Leading to a clinical presentation that is considered evolving with changing characteristics                              Clinical Decision Making  (Eval Complexity):  Moderate - 65685     Time Tracking:     PT Received On: 10/07/18  PT Start Time: 1033     PT Stop Time: 1112  PT Total Time (min): 39 min     Billable Minutes: Evaluation 15 and Therapeutic Activity 24      Ros Mclain, PT  10/07/2018

## 2018-10-07 NOTE — PLAN OF CARE
Problem: Patient Care Overview  Goal: Plan of Care Review  Pt on documented O2. Apparent mild resp distress noted. Will continue to monitor.

## 2018-10-07 NOTE — PROGRESS NOTES
"[facility] Pulmonary/Critical Care Fellow Progress Note    Subjective:      Feels a little better today.  Subjective fever overnight.  No nausea, vomiting, diarrhea.     Objective:   24-hour Vitals:  Temp:  [96.7 °F (35.9 °C)-98.7 °F (37.1 °C)] 98.7 °F (37.1 °C)  Pulse:  [100-109] 102  Resp:  [18-25] 25  SpO2:  [92 %-97 %] 94 %  BP: ()/(73-86) 103/73    Physical Examination:  Vitals: /73 (BP Location: Left arm, Patient Position: Lying)   Pulse 102   Temp 98.7 °F (37.1 °C) (Core Rectal)   Resp (!) 25   Ht 5' 10" (1.778 m)   Wt 107 kg (235 lb 14.3 oz)   SpO2 (!) 94%   BMI 33.85 kg/m²     General: Alert, no acute distress, minimally increased WOB with prolonged conversation  HEENT: NC in place, MMM, +JVD   Cardiovascular: RRR, S1/S2, no MRG  Chest: Decreased BS bilaterally with crackles at the bases  Abdomen: Soft, nondistended, nontender  Extremities: Trace edema of BLE  Pulses: 2+ in all extremities  Neurologic:  Moves all extremities        Laboratory:  Trended Lab Data:  Recent Labs   Lab  10/01/18   0635  10/05/18   1431   WBC  8.60  8.67   HGB  15.1  15.7   HCT  49.2  49.6   PLT  158  147*       Recent Labs   Lab  10/01/18   0635  10/01/18   0823  10/05/18   1431  10/06/18   0506  10/07/18   0415   NA   --   136  133*  132*  134*   K   --   3.7  3.9  4.7  4.2   CL   --   84*  84*  84*  88*   CO2   --   44*  36*  36*  32*   BUN   --   18  28*  33*  38*   CREATININE   --   0.8  1.0  1.1  1.1   GLU   --   103  138*  151*  128*   CALCIUM   --   9.8  9.7  10.2  10.0   MG   --   1.9   --   1.9  2.2   PHOS  3.8   --    --    --    --        Recent Labs   Lab  10/05/18   1431   PROT  7.7   ALBUMIN  3.5   BILITOT  2.2*   AST  24   ALT  27   ALKPHOS  75       No results for input(s): PROTIME, PTT, INR in the last 168 hours.    Cardiac:   Recent Labs   Lab  10/05/18   1431   TROPONINI  0.027*   BNP  1,110*       FLP: No results found for: CHOL, HDL, LDLCALC, TRIG, CHOLHDL  DM:   Lab Results   Component Value " Date    HGBA1C 5.9 (H) 09/26/2018    CREATININE 1.1 10/07/2018     Thyroid:   Lab Results   Component Value Date    TSH 2.973 09/26/2018     Anemia: No results found for: IRON, TIBC, FERRITIN, TDNPJJQP02, FOLATE  Urinalysis:   Lab Results   Component Value Date    COLORU Yellow 10/05/2018    SPECGRAV 1.020 10/05/2018    NITRITE Negative 10/05/2018    KETONESU Negative 10/05/2018    UROBILINOGEN 1.0 10/05/2018       Microbiology Data:  Microbiology Results (last 7 days)     Procedure Component Value Units Date/Time    Culture, Respiratory with Gram Stain [[id number]]     Order Status:  No result Specimen:  Respiratory         Current Medications:     Infusions:   furosemide (LASIX) 2 mg/mL infusion (non-titrating) 10 mg/hr (10/07/18 0043)        Scheduled:   acetaZOLAMIDE  500 mg Oral Daily    albuterol-ipratropium  3 mL Nebulization Q6H    azelastine  1 spray Nasal BID    enoxaparin  40 mg Subcutaneous Daily    fluticasone  2 spray Each Nare BID    guaiFENesin  600 mg Oral BID    moxifloxacin  400 mg Oral Daily    nintedanib  150 mg Oral BID WM    pantoprazole  40 mg Oral Daily        PRN:  acetaminophen, calcium carbonate, dextrose 50%, dextrose 50%, glucagon (human recombinant), glucose, glucose, ondansetron, simethicone, sodium chloride 0.9%     Assessment:     Patrick Dennis is a 58 y.o.male with  Patient Active Problem List    Diagnosis Date Noted    Left upper quadrant pain 10/06/2018    Acute on chronic right heart failure 10/05/2018    Biatrial enlargement     Moderate tricuspid regurgitation     Prediabetes     Hypoxia 09/26/2018    Cor pulmonale 09/17/2018    Pulmonary hypertension due to interstitial lung disease 09/17/2018    IPF (idiopathic pulmonary fibrosis)     Lung transplant candidate     Edema, lower extremity 07/17/2018    Anti-polysaccharide antibody deficiency 05/10/2018    Acute on chronic respiratory failure with hypoxia 04/17/2018    Epistaxis 01/05/2018     Acute bronchitis 07/28/2017    Exertional dyspnea 07/27/2017    ILD (interstitial lung disease) 07/10/2017    Morbid obesity due to excess calories 07/10/2017    Chronic rhinitis 07/10/2017        Plan:     58yoM with PMH significant for ILD (IPF vs chronic HP) on home O2 (~6-8L) and Ofev, severe pHTN, poly-saccharide ab deficiency, obesity who presents to [facility] with acute on chronic hypoxic respiratory failure.  Suspect syncope related to worsening pHTN.  Hypoxia improving with diuresis.  Has some colored sputum and possibly worse diarrhea (chronic on Ofev). Possible he has a viral illness.  Started on abx.     - Continue diuresis with close monitoring of Creatinine, will repeat 2D ECHO   - Should likely be on Lasix 80mg BID on discharge  - Agree w/ Moxifloxacin, follow up sputum culture if he is able to expectorate any  - Scheduled Duonebs  - Has equivocal sx of GERD - continue on PPI, has clear PND sx, continue on nasal steroids/antihistamines  - Needs aggressive Pulmonary Rehab as an outpatient - have ordered PT for him  - Do not strongly feel he needs further steroids  - Continue OFEV  - At risk for ROBBIE./OHS though he denies any sx - has chronically elevated Bicarb and chronic hypercapnea on ABG, would refer for PSG as an outpatient as it may be contributing to his pHTN     Mike Frazier  [facility] Pulmonary/Critical Care Fellow

## 2018-10-07 NOTE — PLAN OF CARE
Problem: Physical Therapy Goal  Goal: Physical Therapy Goal  Goals to be met by: 10/31/18     Patient will increase functional independence with mobility by performing:    Pt to perform bed mobility independently  Pt to perform transfers sit to stand and spt to bedside commode with spvn while maintaining o2 sats >88%  Pt to ambulate with RW, 20' with o2 sats 88-92% while on o2 as needed  Pt to perform general LE TE for ROM, not resistive x10 repsBLE    Outcome: Ongoing (interventions implemented as appropriate)  PT evaluation completed. Pt to follow and progress as able within limits of patient's respiratory ability.  Goal is for pulm rehab once stable, may need HH PT in the meantime, as well as rollator, wheelchair and BSC for energy conservation

## 2018-10-07 NOTE — PROGRESS NOTES
Results for TIFFANIE OGLESBY (MRN [id number]) as of 10/6/2018 20:51   Ref. Range 10/6/2018 20:39   POC PH Latest Ref Range: 7.35 - 7.45  7.408   POC PCO2 Latest Ref Range: 35 - 45 mmHg 66.6 (HH)   POC PO2 Latest Ref Range: 80 - 100 mmHg 82   POC BE Latest Ref Range: -2 to 2 mmol/L 17   POC HCO3 Latest Ref Range: 24 - 28 mmol/L 42.0 (H)   POC SATURATED O2 Latest Ref Range: 95 - 100 % 95   POC TCO2 Latest Ref Range: 23 - 27 mmol/L 44 (H)   Sample Unknown ARTERIAL   DelSys Unknown Nasal Can   Allens Test Unknown Pass   Site Unknown RR

## 2018-10-07 NOTE — ASSESSMENT & PLAN NOTE
Acute on chronic respiratory failure with hypoxia  Chronic respiratory failure with hypoxia and hypercapnia  Biatrial enlargement  Moderate tricuspid regurgitation  Cor pulmonale  Pulmonary hypertension due to interstitial lung disease  Continue supplemental oxygen.  Takes furosemide 80 mg daily, which has not been sufficient at home.  Giving furosemide drip and acetazolamide 500 mg PO BID.  Giving nebulizer treatments.  Goal SpO2 >88%.      With anti-polysaccharidase antibody deficiency, chills and sputum production, may have component of respiratory infection, so started moxifloxacin.  Culture sputum.    Appreciate Pulmonology evaluation and recommendations.

## 2018-10-08 NOTE — DISCHARGE SUMMARY
[facility] Hospital Medicine  Discharge Summary      Patient Name: Patrick Dennis  MRN: [id number]  Admission Date: 10/5/2018  Hospital Length of Stay: 3 days  Discharge Date and Time:  10/08/2018 2:43 PM  Attending Physician: Juan Osuna MD   Discharging Provider: Juan Osuna MD  Primary Care Provider: Primary Doctor No      HPI:   Patrick Dennis is a 58 y.o. white man with obesity, chronic rhinitis, anti-polysaccharide antibody deficiency, idiopathic pulmonary fibrosis (theorized to be due to working in a [employer]), biatrial enlargement, moderate tricuspid regurgitation, cor pulmonale, chronic hypoxic hypercapnic respiratory failure (on nasal cannula 6 liters at baseline), prediabetes.  He takes nintedanib for his pulmonary fibrosis.  He lives in [place], [place].  He is currently staying with his mother in [place], [place] due to proximity to [facility], where he gets his medical care.  His pulmonologist is Dr. Alexis Oliver (formerly Dr. BAILEY Solorio).  He had been deemed not to be a candidate for lung transplant due to poor functional status.   He was hospitalized at [facility] from 9/26/18 to 10/1/18 for hypervolemia.  BNP was 1,389.  He was diuresed with furosemide 40 mg IV x 6 doses of 3 days then furosemide 80 mg by mouth x 5 doses over 3 days.  His supplemental oxygen was weaned to 8 liters, not quite his baseline.  He had diuresed to a net fluid status of negative 11,370 mL.  He was prescribed furosemide 80 mg once daily.     Over the next 4 days, he gained 2.5 lbs.  On 10/5/18, he passed out while using the bathroom.  His oxygen saturation was found to be in the 60s.  He also had epigastric abdominal pain and diarrhea.  His mother could not move him, so she called EMS.  He was taken to [facility].  BNP was 1,110.  Chest X-ray was similar to the one done on 9/26/18, with  bilateral patchy pulmonary opacities.  He was given furosemide 40 mg IV to start diuresis, and admitted by [facility].    * No surgery found *      Hospital Course:   He was put on furosemide drip and acetazolamide daily.  He had subjective fever and chills and felt that he had respiratory infection.  Procalcitonin was only 0.04, but he was started on moxifloxacin.  Pulmonology was consulted for any additional recommendations, and they added scheduled nebulizer treatments and sputum culture.  He has worsening abdominal pain.  On 10/8/18 he was feeling more short of breath.  He was put on BiPAP.  He stopped breathing.  He required CPR and epinephrine and was resuscitated.  He coded again.  Pulmonology felt this was due to his advanced pulmonary fibrosis and pulmonary hypertension.  Pulmonology discussed with his family and withdrawal of care was determined to be in his best interest.  He was taken of vasopressors and  afterward.    Cause of death: Idiopathic pulmonary fibrosis  Time of death: 10/8/18 14:10     Consults:   Consults (From admission, onward)        Status Ordering Provider     Inpatient consult to Pulmonology  Once     Provider:  (Not yet assigned)    Completed JESSICA PICKERING     Inpatient consult to Registered Dietitian/Nutritionist  Once     Provider:  (Not yet assigned)    Acknowledged NIKO RECINOS          No new Assessment & Plan notes have been filed under this hospital service since the last note was generated.  Service: Hospital Medicine    Final Active Diagnoses:    Diagnosis Date Noted POA    PRINCIPAL PROBLEM:  Acute on chronic right heart failure [I50.813] 10/05/2018 Yes    Left upper quadrant pain [R10.12] 10/06/2018 Yes    Biatrial enlargement [I51.7]  Yes     Chronic    Moderate tricuspid regurgitation [I07.1]  Yes     Chronic    Chronic respiratory failure with hypoxia and hypercapnia [J96.11, J96.12] 2018 Yes     Chronic    Cor pulmonale  [I27.81] 2018 Yes     Chronic    Pulmonary hypertension due to interstitial lung disease [I27.23, J84.9] 2018 Yes     Chronic    IPF (idiopathic pulmonary fibrosis) [J84.112]  Yes     Chronic    Anti-polysaccharide antibody deficiency [D80.6] 05/10/2018 Yes     Chronic    Acute on chronic respiratory failure with hypoxia [J96.21] 2018 Yes    Morbid obesity due to excess calories [E66.01] 07/10/2017 Yes    Chronic rhinitis [J31.0] 07/10/2017 Yes     Chronic      Problems Resolved During this Admission:       Discharged Condition:     Disposition:     Follow Up:    Patient Instructions:   No discharge procedures on file.    Significant Diagnostic Studies:   X-Ray Chest PA And Lateral 10/05/18: FINDINGS:  The mediastinal structures are midline.  The cardiac silhouette is not enlarged.  Lung volumes are low, limiting evaluation.  Bilateral patchy pulmonary opacities are present similar to prior exam consistent with chronic interstitial disease, possibly with superimposed infection or edema.  No osseous abnormalities are identified.  Impression: Bilateral patchy pulmonary opacities are present similar to prior exam consistent with chronic interstitial disease, possibly with superimposed infection or edema.  X-Ray Chest AP Portable 10/08/18: FINDINGS:  Endotracheal tube terminates approximately 3.4 cm superior to the gab.  A nasogastric tube terminates off field of view.  Unchanged cardiomediastinal contour.  Low lung volumes.  Diffuse bilateral patchy airspace opacities, worsened compared to prior exam.  Probable small left pleural effusion.  Suggestion of free air under the right diaphragm is likely secondary to superimposition of opacities.  No pneumothorax.  Impression: Interval worsening of diffuse bilateral airspace opacities, may represent edema versus multifocal pneumonia.  Suggestion of free air under the right diaphragm likely secondary to confluence of shadows.   Recommend repeat radiograph to exclude.     Medications:  Reconciled Home Medications:      Medication List      ASK your doctor about these medications    acetaminophen 325 MG tablet  Commonly known as:  TYLENOL  Take 650 mg by mouth every 6 (six) hours as needed for Pain.     albuterol 2.5 mg /3 mL (0.083 %) nebulizer solution  Commonly known as:  PROVENTIL  Take 3 mLs (2.5 mg total) by nebulization every 6 (six) hours as needed for Shortness of Breath.     diphenhydrAMINE 25 mg capsule  Commonly known as:  BENADRYL  Take 1 tablet by mouth before infusion 2 times per month     fexofenadine 180 MG tablet  Commonly known as:  ALLEGRA  Take 180 mg by mouth daily as needed.     furosemide 20 MG tablet  Commonly known as:  LASIX  Take 4 tablets (80 mg total) by mouth once daily.     guaiFENesin 400 mg Tab  Commonly known as:  HUMIBID E  Take 400 mg by mouth 2 (two) times daily.     Immune Globulin G (IGG)-PRO-IGA 10 % injection (Privigen) 10 % Soln  Commonly known as:  PRIVIGEN  Inject 750 mLs (75 g total) into the vein every 28 days.     nintedanib 150 mg Cap  Commonly known as:  OFEV  Take 150 mg by mouth 2 (two) times daily.     potassium chloride SA 10 MEQ tablet  Commonly known as:  K-DUR,KLOR-CON  Take 1 tablet (10 mEq total) by mouth 2 (two) times daily.            Indwelling Lines/Drains at time of discharge:   Lines/Drains/Airways     Drain                 Urethral Catheter 10/08/18 0953 Latex less than 1 day          Airway                 Airway - Non-Surgical 10/08/18 0913 Other (Comment) less than 1 day          Pressure Ulcer                 Pressure Injury 10/07/18 0742 Gluteal cleft 1 day                Time spent on the discharge of patient: 45 minutes  Patient was seen and examined on the date of discharge and determined to be suitable for discharge.    Juan Osuna MD  Department of Hospital Medicine  [facility]

## 2018-10-08 NOTE — ANESTHESIA PROCEDURE NOTES
Intubation    Diagnosis: Respiratory failure  Patient location during procedure: floor  Procedure start time: 10/8/2018 9:13 AM  Timeout: 10/8/2018 9:12 AM  Procedure end time: 10/8/2018 9:14 AM  Staffing  Anesthesiologist: Terry Peterson MD  Resident/CRNA: Juan José Paredes MD  Performed: resident/CRNA   Anesthesiologist was present at the time of the procedure.  Preanesthetic Checklist  Completed: patient identified, site marked, surgical consent, pre-op evaluation, timeout performed, IV checked, risks and benefits discussed, monitors and equipment checked and anesthesia consent given  Intubation  Indication: respiratory failure mask ventilation: moderately difficult with oral airway.  Intubation: n/a, laryngoscopy direct, Mar 4.  Endotracheal Tube: oral, 7.5 mm ID, cuffed (inflated to minimal occlusive pressure)  Attempts: 1, Grade II - cords partially seen  Complicating Factors: anterior larynx  Tube secured at 23 cm at the lips.  Findings post-intubation: bilateral breath sounds, atraumatic / condition of teeth unchanged  Position Confirmation: auscultation

## 2018-10-08 NOTE — PROGRESS NOTES
RR 40, O2 saturation 88-90 on high flow nasal cannula 15 lpm. VIVIANE Boyd notified Dr. Osuna. Patient placed on NRB mask, flow @ 15 lpm. ABG ordered.

## 2018-10-08 NOTE — ASSESSMENT & PLAN NOTE
Acute on chronic respiratory failure with hypoxia  Chronic respiratory failure with hypoxia and hypercapnia  Biatrial enlargement  Moderate tricuspid regurgitation  Cor pulmonale  Pulmonary hypertension due to interstitial lung disease  Appreciate Pulmonology.  Poor prognosis.  Failed inpatient treatment.  Withdraw care when  arrives.

## 2018-10-08 NOTE — PROGRESS NOTES
Pt transferred to ICU from room 425 after ROSC s/p arrest. Patient intubated and ventilated via ambu bag 100% FiO2, cardiac monitoring in place.  Patient nodding appropriately to questions at this time.

## 2018-10-08 NOTE — PROGRESS NOTES
Escalating pressor support. BP still decreasing as well as HR. Family given updates and supported in their grieving.

## 2018-10-08 NOTE — PLAN OF CARE
Problem: Patient Care Overview  Goal: Plan of Care Review  Outcome: Ongoing (interventions implemented as appropriate)  Plan of care reviewed with patient. Verbal reported of understanding. NSR to ST on monitor with no red alarms noted. NC 10 LPM. SpO2 95%.  Lasix drip infusing 5mL/hr. No acute distress noted. Bed on lowest position with bed alarm set. Head of bed elevated.  Side rails x 2 are up. Patient will be monitor over night.

## 2018-10-08 NOTE — NURSING
Notified of left lower quadrant abdominal pain. Upon entering room patient noted to be diaphoretic with respirations of 40 per min. SPO2 82% on 10l high flow. Increased to 15L highflow. Respiratory therapist at bedside and treatment administered.

## 2018-10-08 NOTE — NURSING
Dr. Osuna notified of abdominal pain by charge nurse Brielle Dale RN and catscan abdomen requested.

## 2018-10-08 NOTE — PROGRESS NOTES
"[facility] Pulmonary Critical Care Progress Note    CC: Acute on chronic respiratory failure    Interval History: Increasing O2 requirements through out the night, complaining of some abdominal pain as well. This morning there are notes to suggest worsening respiratory status and tachypnea. MET and Code blue called shortly after 9 am. ROSC after two cycles of ACLS and 2 doses epinephrine. Taken to ICU bed 265. Noted to be in 3rd degreee HB, followed by another PEA arrest, 1 cycle and epi x 1. Now on 1 mcg/kg/min levo. Intubated during code blue.     Unable to obtain ROS due to critical illness    BP (!) 64/44   Pulse (!) 128   Temp 97.4 °F (36.3 °C) (Other (see comments)) Comment (Src): forehead   Resp (!) 45   Ht 5' 10" (1.778 m)   Wt 107 kg (235 lb 14.3 oz)   SpO2 (!) 89%   BMI 33.85 kg/m²     Physical Exam   Constitutional: He appears well-developed and well-nourished.   HENT:   Head: Normocephalic and atraumatic.   Eyes: Conjunctivae are normal. No scleral icterus.   Neck: Neck supple. JVD present.   Cardiovascular: Regular rhythm. Tachycardia present. Exam reveals distant heart sounds and decreased pulses.   Extremities are cold and dry   Pulmonary/Chest: Effort normal. No stridor. No respiratory distress. He has no wheezes. He has rales.   Equal, mechanical   Abdominal: Soft. He exhibits no distension.   Musculoskeletal: He exhibits no edema.   Neurological: He is unresponsive. He exhibits abnormal muscle tone. GCS eye subscore is 1. GCS verbal subscore is 1. GCS motor subscore is 1.   Pupils 4mm and sluggish bilaterally   Skin: Skin is dry. Capillary refill takes more than 3 seconds. No rash noted. No erythema. There is pallor.   Vitals reviewed.    Labs reviewed  Cr 1.7  Mixed respiratory and metabolic acidosis on ABG, pH 7.1  AST/ALT 1484/1184  Lactate 7.2    CXR from post intubation, bilateral diffuse airspace disease, ? possible free air under right hemidiaphragm, however not clearly defined and patient " was supine during film.       Assessment and Plan     Mr Dennis has suffered two cardiac arrests this morning, non-shockable rhythms (PEA) both times with likely 3rd degree heart block occurring between episodes. He is presently being maintained on mechanical ventilation, 100%Fio2, and high dose vasopressors (1 mcg/kg/min norepi) and a fentanyl infusion. His HR is stable in the 120's (sinus tachycardia), however BP is marginal (MAP 65-70). Bedside TTE was suggestive of biventricular failure with grossly dilated RV. Lactate 7 post arrest and CMP reveals acutely elevated Scr and LFTs are suggestive of ischemic hepatopathy.     I personally discussed Mr Dennis's grave prognosis given his underlying chronic disease, worsening heart failure and dependence on high dose vasopressors following his cardiac arrest. Following his second arrest, his neurologic status is also unclear (following the first arrest he was alert and following commands). His likelihood of survival to hospital discharge si extremely low at this point, even if maximal therapies were initiated (central venous and arterial lines, high dose vasopressors and possibly inotropes, possible need for temporary HD, etc.). The family asked if this acute worsening would now make him a candidate for lung transplant, I assured them that it does not.     Given the options of escalating his care to attempt to prolong survival vs maintaining current level of support without escalation vs. Terminal withdrawal of all life prolonging support -- the family has elected to discontinue the levophed once their  arrives. They understand that the likely outcome to that action would be another cardiac arrest. They have requested that we focus on comfort during that time.    Agree with the family's decision. Based on my conversation with the family I have updated the code status to DNR and completed the required documentation.    Ray Arroyo MD  [facility] Pul[facility] and  Critical Care Fellow  Pager: [phone]  Cell: [phone]

## 2018-10-08 NOTE — SIGNIFICANT EVENT
Hospital Medicine                                                                                       Death Note      Called to bedside by patient's nurse. Nursing supervisor notified. Family at bedside.  has been called and is also at bedside.  Patient is not responding to verbal or tactile stimuli. Patient does not have a papillary or corneal reflex. His pupils are fixed and dilated. No heart or breath sounds on auscultation. No respirations. No palpable pulses.     Time of death: 10/8/18 14:10.     Cause of Death: idiopathic pulmonary fibrosis

## 2018-10-08 NOTE — SUBJECTIVE & OBJECTIVE
Interval History: See above.    Review of Systems   Unable to perform ROS: Mental status change     Objective:     Vital Signs (Most Recent):  Temp: 97.4 °F (36.3 °C) (10/08/18 0717)  Pulse: (!) 128 (10/08/18 1330)  Resp: (!) 45 (10/08/18 1330)  BP: (!) 64/44 (10/08/18 1330)  SpO2: (!) 89 % (10/08/18 1330) Vital Signs (24h Range):  Temp:  [97 °F (36.1 °C)-97.8 °F (36.6 °C)] 97.4 °F (36.3 °C)  Pulse:  [] 128  Resp:  [0-54] 45  SpO2:  [86 %-99 %] 89 %  BP: ()/() 64/44     Weight: 107 kg (235 lb 14.3 oz)  Body mass index is 33.85 kg/m².    Intake/Output Summary (Last 24 hours) at 10/8/2018 1350  Last data filed at 10/8/2018 1043  Gross per 24 hour   Intake 803.34 ml   Output 1075 ml   Net -271.66 ml      Physical Exam   Constitutional: He appears well-developed and well-nourished. He is intubated.   Cardiovascular: Regular rhythm. Tachycardia present.   Pulmonary/Chest: Apnea noted. He is intubated.   Neurological: He is unresponsive. He displays no tremor.       Significant Labs: All pertinent labs within the past 24 hours have been reviewed.    Significant Imaging: I have reviewed all pertinent imaging results/findings within the past 24 hours.   X-Ray Chest AP Portable 10/08/18: FINDINGS:  Endotracheal tube terminates approximately 3.4 cm superior to the gab.  A nasogastric tube terminates off field of view.  Unchanged cardiomediastinal contour.  Low lung volumes.  Diffuse bilateral patchy airspace opacities, worsened compared to prior exam.  Probable small left pleural effusion.  Suggestion of free air under the right diaphragm is likely secondary to superimposition of opacities.  No pneumothorax.  Impression: Interval worsening of diffuse bilateral airspace opacities, may represent edema versus multifocal pneumonia.  Suggestion of free air under the right diaphragm likely secondary to confluence of shadows.  Recommend repeat radiograph to exclude.

## 2018-10-08 NOTE — PT/OT/SLP PROGRESS
Physical Therapy  Discharge    Patient Name:  Patrick Dennis   MRN:  [id number]    Patient with change in status transferred to ICU. Will DC PT service and await new orders per physician when deemed appropriate.     Aurelio Del Rio, PT

## 2018-10-08 NOTE — NURSING
"Spoke with patient and mother. Asked patient how he was feeling. Explained that patient has been breathing heavier than before. States "I'm only breathing this way because my stomach hurts." Mother states "Yes, I would be breathing fast too if I was hurting like that. He hasn't had a bowel movement in days". Will notify Deanna LPN of patient pain and request for medication for bowels.  "

## 2018-10-08 NOTE — PROGRESS NOTES
[facility] Hospital Medicine  Progress Note    Patient Name: Patrick Dennis  MRN: [id number]  Patient Class: IP- Inpatient   Admission Date: 10/5/2018  Length of Stay: 3 days  Attending Physician: Juan Osuna MD  Primary Care Provider: Primary Doctor No        Subjective:     Principal Problem:Acute on chronic right heart failure    HPI:  Patrick Dennis is a 58 y.o. 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  He takes nintedanib for his pulmonary fibrosis.  He lives in [place], [place].  He is currently staying with his mother in [place], [place] due to proximity to [facility], where he gets his medical care.  His pulmonologist is Dr. Alexis Oliver (formerly Dr. BAILEY Solorio).  He had been deemed not to be a candidate for lung transplant due to poor functional status.   He was hospitalized at [facility] from 9/26/18 to 10/1/18 for hypervolemia.  BNP was 1,389.  He was diuresed with furosemide 40 mg IV x 6 doses of 3 days then furosemide 80 mg by mouth x 5 doses over 3 days.  His supplemental oxygen was weaned to 8 liters, not quite his baseline.  He had diuresed to a net fluid status of negative 11,370 mL.  He was prescribed furosemide 80 mg once daily.     Over the next 4 days, he gained 2.5 lbs.  On 10/5/18, he passed out while using the bathroom.  His oxygen saturation was found to be in the 60s.  He also had epigastric abdominal pain and diarrhea.  His mother could not move him, so she called EMS.  He was taken to [facility].  BNP was 1,110.  Chest X-ray was similar to the one done on 9/26/18, with  bilateral patchy pulmonary opacities.  He was given furosemide 40 mg IV to start diuresis, and admitted by [facility].    Hospital Course:  He was put on furosemide drip and acetazolamide daily.  He had subjective fever and chills and felt that he had respiratory infection.  Procalcitonin was only 0.04, but he was started on moxifloxacin.  Pulmonology was consulted for any additional recommendations, and they added scheduled nebulizer treatments and sputum culture.  He has worsening abdominal pain.  On 10/8/18 he was feeling more short of breath.  He was put on BiPAP.  He stopped breathing.  He required CPR and epinephrine and was resuscitated.  He coded again.  Pulmonology felt this was due to his advanced pulmonary fibrosis and pulmonary hypertension.  Pulmonology discussed with his family and withdrawal of care was determined to be in his best interest.      Interval History: See above.    Review of Systems   Unable to perform ROS: Mental status change     Objective:     Vital Signs (Most Recent):  Temp: 97.4 °F (36.3 °C) (10/08/18 0717)  Pulse: (!) 128 (10/08/18 1330)  Resp: (!) 45 (10/08/18 1330)  BP: (!) 64/44 (10/08/18 1330)  SpO2: (!) 89 % (10/08/18 1330) Vital Signs (24h Range):  Temp:  [97 °F (36.1 °C)-97.8 °F (36.6 °C)] 97.4 °F (36.3 °C)  Pulse:  [] 128  Resp:  [0-54] 45  SpO2:  [86 %-99 %] 89 %  BP: ()/() 64/44     Weight: 107 kg (235 lb 14.3 oz)  Body mass index is 33.85 kg/m².    Intake/Output Summary (Last 24 hours) at 10/8/2018 1350  Last data filed at 10/8/2018 1043  Gross per 24 hour   Intake 803.34 ml   Output 1075 ml   Net -271.66 ml      Physical Exam   Constitutional: He appears well-developed and well-nourished. He is intubated.   Cardiovascular: Regular rhythm. Tachycardia present.   Pulmonary/Chest: Apnea noted. He is intubated.   Neurological: He is unresponsive. He displays no tremor.       Significant Labs: All pertinent labs within the past 24 hours have  been reviewed.    Significant Imaging: I have reviewed all pertinent imaging results/findings within the past 24 hours.   X-Ray Chest AP Portable 10/08/18: FINDINGS:  Endotracheal tube terminates approximately 3.4 cm superior to the gab.  A nasogastric tube terminates off field of view.  Unchanged cardiomediastinal contour.  Low lung volumes.  Diffuse bilateral patchy airspace opacities, worsened compared to prior exam.  Probable small left pleural effusion.  Suggestion of free air under the right diaphragm is likely secondary to superimposition of opacities.  No pneumothorax.  Impression: Interval worsening of diffuse bilateral airspace opacities, may represent edema versus multifocal pneumonia.  Suggestion of free air under the right diaphragm likely secondary to confluence of shadows.  Recommend repeat radiograph to exclude.    Assessment/Plan:      * Acute on chronic right heart failure    Acute on chronic respiratory failure with hypoxia  Chronic respiratory failure with hypoxia and hypercapnia  Biatrial enlargement  Moderate tricuspid regurgitation  Cor pulmonale  Pulmonary hypertension due to interstitial lung disease  Appreciate Pulmonology.  Poor prognosis.  Failed inpatient treatment.  Withdraw care when  arrives.        Left upper quadrant pain    May be related to right heart failure.          IPF (idiopathic pulmonary fibrosis)    Continue home nontedanib 150 mg twice daily.          Anti-polysaccharide antibody deficiency    Gets [id number] every 28 days.          Chronic rhinitis    Uses fexofenadine as needed at home.          Morbid obesity due to excess calories    Is keeping him from being a lung transplant candidate.  Likely has obesity hypoventilation syndrome.              VTE Risk Mitigation (From admission, onward)        Ordered     enoxaparin injection 40 mg  Daily      10/05/18 2114     IP VTE HIGH RISK PATIENT  Once      10/05/18 2114          Critical care time spent on the  evaluation and treatment of severe organ dysfunction, review of pertinent labs and imaging studies, discussions with consulting providers and discussions with patient/family: 60 minutes.    Juan Osuna MD  Department of Hospital Medicine   [facility]

## 2018-10-08 NOTE — NURSING
Patient placed on nonrebreather per respiratory therapist. Dr. Osuna notified of respiratory distress and increase in oxygen needs. Will order ABG and cxray.

## 2018-10-08 NOTE — NURSING
Entered room to help Brielle Dale RN. Patient noted to be pale and diaphoretic. Patient then stopped breathing. Code blue called immediately and compressions began. Code cart retrieved and backboard placed under patient. Ambu bag placed and bagging began per respiratory therapist.

## 2018-10-08 NOTE — CHAPLAIN
A second Code Blue was called. I brought three family members ice water.  They did not need anything else.  I returned to the ICU on stand-by. Support to staff.

## 2018-10-08 NOTE — PROGRESS NOTES
Family at bedside. MD explained poor prognosis. Family has decided to wait for  and then discontinue use of pressors. Family made comfortable at bedside with chairs and kleenex. Offered water and coffee. Explained whatever they may need, we are here to support them. Supported in their decisions and grieving process. Will continue to monitor pt.

## 2018-10-08 NOTE — PROGRESS NOTES
Pt HR went from tachy to bradycardic, then asystole on the monitor. Unable to assess a BP. MD called to bedside. No heart sounds heard, no pulses felt, ventilator turned off, no respirations. Family at bedside. Time of death pronounced at 1410.

## 2018-10-08 NOTE — PLAN OF CARE
Plan of care reviewed with patient, understanding verbalized.  Pt ST on tele, in low 100s. Bed alarm on, call light in reach, fall precautions in place.  Will continue to monitor.

## 2018-10-08 NOTE — NURSING
Patient found to be less responsive when charge nurse entered room. Patient not following commands and diaphoretic. MET called.

## 2018-10-08 NOTE — CHAPLAIN
I received Beep for Code Blue on 4th floor.  Mother and Sister were outside of patient room.  Sister was praying in tongues and I joined them in prayer.  Neither conversed with me. Patient was transferred to ICU and I walked family to waiting room.  I attempted to stay with them but Sister quickly dismissed me, stating that they would call me if needed. I then went to ICU and provided prayer for patient.

## 2018-10-09 DIAGNOSIS — R07.9 CHEST PAIN: Primary | ICD-10-CM

## 2018-10-09 LAB
BACTERIA SPEC AEROBE CULT: NORMAL
ESTIMATED PA SYSTOLIC PRESSURE: 21.84
GRAM STN SPEC: NORMAL
MITRAL VALVE MOBILITY: NORMAL
RETIRED EF AND QEF - SEE NOTES: 20 (ref 55–65)
TRICUSPID VALVE REGURGITATION: ABNORMAL
